# Patient Record
Sex: FEMALE | Race: WHITE | NOT HISPANIC OR LATINO | Employment: OTHER | URBAN - METROPOLITAN AREA
[De-identification: names, ages, dates, MRNs, and addresses within clinical notes are randomized per-mention and may not be internally consistent; named-entity substitution may affect disease eponyms.]

---

## 2017-11-08 VITALS — SYSTOLIC BLOOD PRESSURE: 163 MMHG | DIASTOLIC BLOOD PRESSURE: 83 MMHG | WEIGHT: 158.75 LBS

## 2017-11-08 RX ORDER — NITROGLYCERIN 0.4 MG/1
0.4 TABLET SUBLINGUAL EVERY 5 MIN PRN
COMMUNITY

## 2017-11-08 RX ORDER — ROSUVASTATIN CALCIUM 40 MG/1
40 TABLET, COATED ORAL DAILY
COMMUNITY

## 2017-11-08 RX ORDER — PANTOPRAZOLE SODIUM 40 MG/1
40 TABLET, DELAYED RELEASE ORAL DAILY
COMMUNITY
End: 2020-09-18

## 2017-11-08 RX ORDER — METOPROLOL SUCCINATE 25 MG/1
25 TABLET, EXTENDED RELEASE ORAL 2 TIMES DAILY
COMMUNITY
End: 2019-06-24 | Stop reason: SDUPTHER

## 2017-11-08 RX ORDER — ERGOCALCIFEROL 1.25 MG/1
50000 CAPSULE ORAL
COMMUNITY

## 2017-11-08 RX ORDER — ASPIRIN 81 MG/1
325 TABLET ORAL DAILY
COMMUNITY

## 2017-11-14 ENCOUNTER — OFFICE VISIT (OUTPATIENT)
Dept: SURGERY | Facility: CLINIC | Age: 67
End: 2017-11-14
Payer: MEDICARE

## 2017-11-14 VITALS
SYSTOLIC BLOOD PRESSURE: 126 MMHG | WEIGHT: 162 LBS | BODY MASS INDEX: 31.8 KG/M2 | HEIGHT: 60 IN | DIASTOLIC BLOOD PRESSURE: 83 MMHG

## 2017-11-14 DIAGNOSIS — K81.1 CHRONIC CHOLECYSTITIS: Primary | ICD-10-CM

## 2017-11-14 PROCEDURE — 99204 OFFICE O/P NEW MOD 45 MIN: CPT | Mod: ,,, | Performed by: SURGERY

## 2017-11-14 RX ORDER — HYDROCODONE BITARTRATE AND ACETAMINOPHEN 7.5; 325 MG/1; MG/1
TABLET ORAL
COMMUNITY
Start: 2017-10-24 | End: 2018-11-29

## 2017-11-14 RX ORDER — AMOXICILLIN 500 MG
2 CAPSULE ORAL DAILY
COMMUNITY
End: 2019-04-29

## 2017-11-14 RX ORDER — CHOLECALCIFEROL (VITAMIN D3) 25 MCG
1000 TABLET ORAL DAILY
COMMUNITY
End: 2018-11-29

## 2017-11-14 RX ORDER — AMOXICILLIN 875 MG/1
TABLET, FILM COATED ORAL
COMMUNITY
Start: 2017-10-24 | End: 2018-11-29

## 2017-11-14 RX ORDER — METOPROLOL TARTRATE 25 MG/1
25 TABLET, FILM COATED ORAL DAILY
COMMUNITY
Start: 2017-10-26 | End: 2018-11-29

## 2017-11-14 RX ORDER — NAPROXEN 500 MG/1
TABLET ORAL
COMMUNITY
Start: 2017-10-30 | End: 2018-11-29

## 2017-11-14 RX ORDER — BUSPIRONE HYDROCHLORIDE 7.5 MG/1
TABLET ORAL
COMMUNITY
Start: 2017-09-26 | End: 2018-11-29

## 2017-11-14 RX ORDER — POLYETHYLENE GLYCOL 3350 17 G/17G
POWDER, FOR SOLUTION ORAL
COMMUNITY
Start: 2017-09-26 | End: 2019-04-29

## 2017-11-14 RX ORDER — DICYCLOMINE HYDROCHLORIDE 10 MG/1
20 CAPSULE ORAL 4 TIMES DAILY
COMMUNITY
Start: 2017-09-26 | End: 2018-11-29

## 2017-11-14 NOTE — PROGRESS NOTES
Subjective:       Patient ID: Aimee Palumbo is a 67 y.o. female.    Chief Complaint: Gall Bladder Problem (Referred by  to ale gallbladder)      HPI:  Gallbladder: Patient presents for evaluation of gallbladder problems. Problems were first noted 6 months ago. Current symptoms include RUQ Pain, nausea, epigastric pain.  Pancreatic symptoms include none. Patient denies vomiting, jaundice, fever, chills.  Symptoms are gradually worsening.    Patient has a six-month history of intermittent right upper quadrant abdominal pain. In the last 6 weeks she complains of frequent nausea and epigastric discomfort associated with bloating and irregular bowels. She notices this is worse with fried food. During workup she was found to have gallstones.      Past Medical History:   Diagnosis Date    Coronary artery disease     GERD (gastroesophageal reflux disease)     Hyperlipidemia     Hypertension     Mitral valve prolapse     Osteoporosis      Past Surgical History:   Procedure Laterality Date    APPENDECTOMY      BREAST SURGERY  12/2010    Reduction    CARDIAC CATHETERIZATION      Cardiac Stents Placed    cardiovascular stress testing  09/19/2017    echocardiography  09/19/2017    ESOPHAGOGASTRODUODENOSCOPY      TONSILLECTOMY      TUBAL LIGATION       Review of patient's allergies indicates:  No Known Allergies  Medication List with Changes/Refills   Current Medications    AMOXICILLIN (AMOXIL) 875 MG TABLET        ASPIRIN (ECOTRIN) 81 MG EC TABLET    Take 81 mg by mouth once daily.    BUSPIRONE (BUSPAR) 7.5 MG TABLET        DICYCLOMINE (BENTYL) 10 MG CAPSULE        ERGOCALCIFEROL (ERGOCALCIFEROL) 50,000 UNIT CAP    Take 50,000 Units by mouth every 7 days.    FISH OIL-OMEGA-3 FATTY ACIDS 300-1,000 MG CAPSULE    Take 2 g by mouth once daily.    HYDROCODONE-ACETAMINOPHEN 7.5-325MG (NORCO) 7.5-325 MG PER TABLET        METOPROLOL SUCCINATE (TOPROL-XL) 25 MG 24 HR TABLET    Take 12.5 mg by mouth 2 (two)  times daily.    METOPROLOL TARTRATE (LOPRESSOR) 25 MG TABLET        NAPROXEN (EC NAPROSYN) 500 MG EC TABLET        NITROGLYCERIN (NITROSTAT) 0.4 MG SL TABLET    Place 0.4 mg under the tongue every 5 (five) minutes as needed for Chest pain.    PANTOPRAZOLE (PROTONIX) 40 MG TABLET    Take 40 mg by mouth once daily.    POLYETHYLENE GLYCOL (GLYCOLAX) 17 GRAM/DOSE POWDER        ROSUVASTATIN (CRESTOR) 20 MG TABLET    Take 20 mg by mouth once daily.    VITAMIN D 1000 UNITS TAB    Take 1,000 Units by mouth once daily.    ZOLEDRONIC ACID/MANNITOL-WATER (RECLAST IV)    Inject into the vein.     Family History   Problem Relation Age of Onset    Breast cancer Mother     Lung cancer Father     Diabetes Brother     Diabetes Maternal Uncle     Lung cancer Paternal Uncle     Stroke Maternal Grandmother      Social History     Social History    Marital status:      Spouse name: N/A    Number of children: N/A    Years of education: N/A     Social History Main Topics    Smoking status: Never Smoker    Smokeless tobacco: Never Used    Alcohol use Yes      Comment: occ    Drug use: No    Sexual activity: Not Asked     Other Topics Concern    None     Social History Narrative    None         Review of Systems   Constitutional: Negative for appetite change, chills, fever and unexpected weight change.   HENT: Negative for hearing loss, rhinorrhea, sore throat and voice change.    Eyes: Negative for photophobia and visual disturbance.   Respiratory: Negative for cough, choking and shortness of breath.    Cardiovascular: Negative for chest pain, palpitations and leg swelling.   Gastrointestinal: Positive for abdominal distention and nausea. Negative for abdominal pain, blood in stool, constipation, diarrhea and vomiting.   Endocrine: Negative for cold intolerance, heat intolerance, polydipsia and polyuria.   Musculoskeletal: Negative for arthralgias, back pain, joint swelling and neck stiffness.   Skin: Negative for  color change, pallor and rash.   Neurological: Negative for dizziness, seizures, syncope and headaches.   Hematological: Negative for adenopathy. Does not bruise/bleed easily.   Psychiatric/Behavioral: Negative for agitation, behavioral problems and confusion.       Objective:      Physical Exam   Constitutional: She appears well-developed and well-nourished.  Non-toxic appearance. No distress.   HENT:   Head: Normocephalic and atraumatic. Head is without abrasion and without laceration.   Right Ear: External ear normal.   Left Ear: External ear normal.   Nose: Nose normal.   Mouth/Throat: Oropharynx is clear and moist.   Eyes: EOM are normal. Pupils are equal, round, and reactive to light.   Neck: Trachea normal. No tracheal deviation and normal range of motion present. No thyroid mass and no thyromegaly present.   Cardiovascular: Normal rate and regular rhythm.    Pulmonary/Chest: Effort normal. No accessory muscle usage. No tachypnea. No respiratory distress.   Abdominal: Soft. Normal appearance and bowel sounds are normal. She exhibits no distension and no mass. There is no hepatosplenomegaly. There is no tenderness. There is no tenderness at McBurney's point and negative Hugo's sign. No hernia.   Lymphadenopathy:     She has no cervical adenopathy.     She has no axillary adenopathy.        Right: No inguinal adenopathy present.        Left: No inguinal adenopathy present.   Neurological: She is alert. Coordination and gait normal.   Skin: Skin is warm and intact.   Psychiatric: She has a normal mood and affect. Her speech is normal and behavior is normal.       Assessment/Plan:   Chronic cholecystitis  -     Ambulatory Referral to External Surgery  -     CBC Without Differential; Future; Expected date: 11/14/2017  -     Comprehensive metabolic panel; Future; Expected date: 11/14/2017  -     EKG 12-lead; Future  -     X-Ray Chest PA And Lateral      US reviewed      Planned procedure: Lap Carmen    Mefoxin 2  gm IV on call to OR    NPO past midnight    Reynold cloth scrub per protocol    SCDs Bilateral Lower Extremities    I discussed the proposed procedures the the patient including risks, benefits, indications, alternatives and special concerns.  The patient appears to understand and agrees to go ahead with surgery.  I have made no promises, warranties or verbal agreements beyond what was discussed above.    No Follow-up on file.

## 2017-11-27 LAB
ALBUMIN SERPL-MCNC: 4.4 G/DL (ref 3.1–4.7)
ALP SERPL-CCNC: 69 IU/L (ref 40–104)
ALT (SGPT): 29 IU/L (ref 3–33)
AST SERPL-CCNC: 28 IU/L (ref 10–40)
BILIRUB SERPL-MCNC: 0.9 MG/DL (ref 0.3–1)
BUN SERPL-MCNC: 23 MG/DL (ref 8–20)
CALCIUM SERPL-MCNC: 10 MG/DL (ref 7.7–10.4)
CHLORIDE: 102 MMOL/L (ref 98–110)
CO2 SERPL-SCNC: 30.4 MMOL/L (ref 22.8–31.6)
CREATININE: 0.87 MG/DL (ref 0.6–1.4)
GLUCOSE: 90 MG/DL (ref 70–99)
HCT VFR BLD AUTO: 41.9 % (ref 36–48)
HGB BLD-MCNC: 14.4 G/DL (ref 12–15)
MCH RBC QN AUTO: 30.8 PG (ref 25–35)
MCHC RBC AUTO-ENTMCNC: 34.4 G/DL (ref 31–36)
MCV RBC AUTO: 89.5 FL (ref 79–98)
NUCLEATED RBCS: 0 %
PLATELET # BLD AUTO: 241 K/UL (ref 140–440)
POTASSIUM SERPL-SCNC: 4.4 MMOL/L (ref 3.5–5)
PROT SERPL-MCNC: 7.6 G/DL (ref 6–8.2)
RBC # BLD AUTO: 4.68 M/UL (ref 3.5–5.5)
SODIUM: 138 MMOL/L (ref 134–144)
WBC # BLD AUTO: 9.1 K/UL (ref 5–10)

## 2017-12-28 ENCOUNTER — OFFICE VISIT (OUTPATIENT)
Dept: SURGERY | Facility: CLINIC | Age: 67
End: 2017-12-28
Payer: MEDICARE

## 2017-12-28 VITALS
DIASTOLIC BLOOD PRESSURE: 83 MMHG | SYSTOLIC BLOOD PRESSURE: 126 MMHG | HEIGHT: 60 IN | WEIGHT: 162.06 LBS | BODY MASS INDEX: 31.82 KG/M2

## 2017-12-28 DIAGNOSIS — K81.1 CHRONIC CHOLECYSTITIS: Primary | ICD-10-CM

## 2017-12-28 PROCEDURE — 99024 POSTOP FOLLOW-UP VISIT: CPT | Mod: ,,, | Performed by: SURGERY

## 2017-12-28 NOTE — PROGRESS NOTES
Subjective:       Patient ID: Aimee Palumbo is a 67 y.o. female.    Chief Complaint: Post-op Evaluation ([facility] DOS 12/13/17 Lap Carmen)      HPI:  Aimee Palumbo is here for post-op. Patient has no systemic complaints. Post operative   pain is under control.  Tolerating diet, no nausea/vomiting.  Having normal bowel movements.        Objective:      Physical Exam   Constitutional: She is oriented to person, place, and time. She is cooperative. No distress.   Abdominal: Soft. She exhibits no distension. There is no tenderness. There is no rebound and no guarding.   Neurological: She is oriented to person, place, and time.   Skin:   Incisions are clean, dry and intact  There is no evidence of infection, hematoma or seroma        Assessment/Plan:   Chronic cholecystitis      Path - reviewed    Return for F/U - As needed.

## 2017-12-28 NOTE — LETTER
December 28, 2017      Tom Mariano MD  [address]  [facility] [address]           [facility]  [address]  [address]  [facility] [address]  Phone: [phone]  Fax: [phone]          Patient: Aimee Palumbo   MR Number: [id number]   YOB: 1950   Date of Visit: 12/28/2017       Dear Dr. Tom GAINES García:    Thank you for referring Aimee Palumbo to me for evaluation. Attached you will find relevant portions of my assessment and plan of care.    If you have questions, please do not hesitate to call me. I look forward to following Aimee Palumbo along with you.    Sincerely,    Aury Lai MD    Enclosure  CC:  No Recipients    If you would like to receive this communication electronically, please contact [email or web address] or [phone] to request more information on "[employer]" Link access.    For providers and/or their staff who would like to refer a patient to [facility], please contact us through our one-stop-shop provider referral line, [facility], at [phone].    If you feel you have received this communication in error or would no longer like to receive these types of communications, please e-mail [email or web address]

## 2018-11-29 ENCOUNTER — OFFICE VISIT (OUTPATIENT)
Dept: FAMILY MEDICINE | Facility: CLINIC | Age: 68
End: 2018-11-29
Payer: MEDICARE

## 2018-11-29 VITALS
OXYGEN SATURATION: 96 % | TEMPERATURE: 98 F | BODY MASS INDEX: 33.18 KG/M2 | DIASTOLIC BLOOD PRESSURE: 60 MMHG | HEIGHT: 60 IN | HEART RATE: 76 BPM | WEIGHT: 169 LBS | SYSTOLIC BLOOD PRESSURE: 120 MMHG

## 2018-11-29 DIAGNOSIS — F41.9 ANXIETY: ICD-10-CM

## 2018-11-29 DIAGNOSIS — I10 ESSENTIAL HYPERTENSION: Primary | ICD-10-CM

## 2018-11-29 PROCEDURE — 99213 OFFICE O/P EST LOW 20 MIN: CPT | Mod: ,,, | Performed by: INTERNAL MEDICINE

## 2018-11-29 RX ORDER — VENLAFAXINE 37.5 MG/1
37.5 TABLET ORAL DAILY
COMMUNITY
End: 2019-04-29

## 2018-11-29 RX ORDER — MULTIVITAMIN
1 TABLET ORAL 2 TIMES DAILY
COMMUNITY

## 2018-11-29 RX ORDER — FERROUS SULFATE, DRIED 160(50) MG
1 TABLET, EXTENDED RELEASE ORAL 2 TIMES DAILY WITH MEALS
COMMUNITY

## 2018-11-29 RX ORDER — CETIRIZINE HYDROCHLORIDE 10 MG/1
10 TABLET ORAL DAILY
COMMUNITY
End: 2019-11-11

## 2018-11-29 NOTE — PROGRESS NOTES
Subjective:       Patient ID: Aimee Palumbo is a 68 y.o. female.    Chief Complaint: Establish Care (new patient establishment); Hypertension; Hyperlipidemia; and Anxiety    Here to establish care with me; previously seeing Dr. George who has left the area.  She reports recent labs done with [facility]. She is also followed by Cardiology.   She is aware she is due colonoscopy and sees Dr. Mariano; she was to schedule after her gallbladder surgery but hasn't done so yet.        Hypertension   This is a chronic problem. The problem is controlled. Associated symptoms include palpitations (followed by Cards). Pertinent negatives include no chest pain, headaches, neck pain, peripheral edema or shortness of breath. Past treatments include beta blockers. There are no compliance problems.      Review of Systems   Constitutional: Negative for chills, fatigue, fever and unexpected weight change.   HENT: Positive for hearing loss. Negative for congestion, postnasal drip, rhinorrhea, trouble swallowing and voice change.    Eyes: Negative for photophobia and visual disturbance.   Respiratory: Positive for cough. Negative for apnea, choking, chest tightness, shortness of breath and wheezing.    Cardiovascular: Positive for palpitations (followed by Cards). Negative for chest pain and leg swelling.   Gastrointestinal: Negative for abdominal pain, blood in stool, constipation, diarrhea, nausea, rectal pain and vomiting.   Endocrine: Positive for cold intolerance, heat intolerance and polydipsia. Negative for polyuria.   Genitourinary: Negative for decreased urine volume, difficulty urinating, dysuria, frequency, genital sores, hematuria, menstrual problem, pelvic pain, urgency, vaginal bleeding and vaginal discharge.   Musculoskeletal: Positive for back pain and neck stiffness. Negative for arthralgias, gait problem, joint swelling, myalgias and neck pain.   Skin: Positive for rash (scaring from shingles). Negative for color change  and wound.   Allergic/Immunologic: Negative for environmental allergies and food allergies.   Neurological: Negative for dizziness, tremors, seizures, syncope, facial asymmetry, speech difficulty, weakness, light-headedness, numbness and headaches.   Hematological: Negative for adenopathy. Does not bruise/bleed easily.   Psychiatric/Behavioral: Negative for confusion, hallucinations, sleep disturbance and suicidal ideas. The patient is nervous/anxious.        Past Medical History:   Diagnosis Date    Anxiety     Constipation     Coronary artery disease     GERD (gastroesophageal reflux disease)     Hyperlipidemia     Hypertension     Mitral valve prolapse     Osteoporosis       Past Surgical History:   Procedure Laterality Date    APPENDECTOMY      BREAST SURGERY  12/2010    Reduction    cardiovascular stress testing  09/19/2017    CHOLECYSTECTOMY  12/13/2017    Laparoscopic- Dr Lai    CORONARY STENT PLACEMENT  2007    echocardiography  09/19/2017    ESOPHAGOGASTRODUODENOSCOPY      TONSILLECTOMY      TUBAL LIGATION         Family History   Problem Relation Age of Onset    Breast cancer Mother     Hyperlipidemia Mother     Hypertension Mother     Lung cancer Father     Hyperlipidemia Father     Diabetes Brother     Hyperlipidemia Brother     Cancer Brother         lymphoma    Diabetes Maternal Uncle     Lung cancer Paternal Uncle     Stroke Maternal Grandmother        Social History     Socioeconomic History    Marital status:      Spouse name: None    Number of children: None    Years of education: None    Highest education level: None   Social Needs    Financial resource strain: None    Food insecurity - worry: None    Food insecurity - inability: None    Transportation needs - medical: None    Transportation needs - non-medical: None   Occupational History    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      calcium-vitamin D3 (CALCIUM 500 + D) 500 mg(1,250mg) -200 unit per tablet Take 1 tablet by mouth 2 (two) times daily with meals.       cetirizine (ZYRTEC) 10 MG tablet Take 10 mg by mouth once daily.      ergocalciferol (ERGOCALCIFEROL) 50,000 unit Cap Take 50,000 Units by mouth every 7 days.      fish oil-omega-3 fatty acids 300-1,000 mg capsule Take 2 g by mouth once daily.      metoprolol succinate (TOPROL-XL) 25 MG 24 hr tablet Take 25 mg by mouth 2 (two) times daily.       multivitamin (ONE DAILY MULTIVITAMIN) per tablet Take 1 tablet by mouth once daily.      nitroGLYCERIN (NITROSTAT) 0.4 MG SL tablet Place 0.4 mg under the tongue every 5 (five) minutes as needed for Chest pain.      pantoprazole (PROTONIX) 40 MG tablet Take 40 mg by mouth once daily.      polyethylene glycol (GLYCOLAX) 17 gram/dose powder       rosuvastatin (CRESTOR) 40 MG Tab Take 40 mg by mouth once daily.       venlafaxine (EFFEXOR) 37.5 MG Tab Take 37.5 mg by mouth once daily.      ZOLEDRONIC ACID/MANNITOL-WATER (RECLAST IV) Inject into the vein.       No current facility-administered medications for this visit.        Review of patient's allergies indicates:  No Known Allergies  Objective:    HPI     Establish Care      Additional comments: new patient establishment          Last edited by Neal Nathan MA on 11/29/2018 10:19 AM. (History)      Blood pressure 120/60, pulse 76, temperature 97.6 °F (36.4 °C), temperature source Temporal, height 4' 11.5" (1.511 m), weight 76.7 kg (169 lb), SpO2 96 %. Body " mass index is 33.56 kg/m².   Physical Exam   Constitutional: She appears well-developed. No distress.   Obese     HENT:   Nose: Nose normal.   Mouth/Throat: Oropharynx is clear and moist.   Eyes: Conjunctivae are normal. Right eye exhibits no discharge. Left eye exhibits no discharge. No scleral icterus.   Neck: Carotid bruit is not present.   Cardiovascular: Normal rate, regular rhythm and normal heart sounds.   No murmur heard.  Pulmonary/Chest: Effort normal and breath sounds normal. No respiratory distress. She has no decreased breath sounds. She has no wheezes. She has no rhonchi. She has no rales.   Abdominal: Soft. She exhibits no distension. There is no tenderness. There is no rebound and no guarding.   Musculoskeletal: She exhibits no edema.   Neurological: She is alert. She displays no tremor.   Skin: Skin is warm and dry.   Psychiatric: She has a normal mood and affect. Her speech is normal.   Nursing note and vitals reviewed.          Assessment:       1. Essential hypertension    2. Anxiety        Plan:       Aimee was seen today for establish care, hypertension, hyperlipidemia and anxiety.    Diagnoses and all orders for this visit:    Essential hypertension  Comments:  Well controlled    Anxiety  Comments:  Continue current meds

## 2019-04-29 ENCOUNTER — OFFICE VISIT (OUTPATIENT)
Dept: FAMILY MEDICINE | Facility: CLINIC | Age: 69
End: 2019-04-29
Payer: MEDICARE

## 2019-04-29 VITALS
OXYGEN SATURATION: 95 % | BODY MASS INDEX: 33.38 KG/M2 | TEMPERATURE: 95 F | SYSTOLIC BLOOD PRESSURE: 120 MMHG | HEIGHT: 60 IN | HEART RATE: 68 BPM | DIASTOLIC BLOOD PRESSURE: 76 MMHG | WEIGHT: 170 LBS

## 2019-04-29 DIAGNOSIS — E78.2 MIXED HYPERLIPIDEMIA: ICD-10-CM

## 2019-04-29 DIAGNOSIS — F41.9 ANXIETY: ICD-10-CM

## 2019-04-29 DIAGNOSIS — I10 ESSENTIAL HYPERTENSION: Primary | ICD-10-CM

## 2019-04-29 PROBLEM — R73.01 ELEVATED FASTING GLUCOSE: Status: ACTIVE | Noted: 2017-08-17

## 2019-04-29 PROCEDURE — 99213 PR OFFICE/OUTPT VISIT, EST, LEVL III, 20-29 MIN: ICD-10-PCS | Mod: ,,, | Performed by: INTERNAL MEDICINE

## 2019-04-29 PROCEDURE — 99213 OFFICE O/P EST LOW 20 MIN: CPT | Mod: ,,, | Performed by: INTERNAL MEDICINE

## 2019-04-29 RX ORDER — EZETIMIBE 10 MG/1
10 TABLET ORAL DAILY
COMMUNITY

## 2019-04-29 RX ORDER — VENLAFAXINE 37.5 MG/1
37.5 TABLET ORAL DAILY
Qty: 90 TABLET | Refills: 1 | Status: SHIPPED | OUTPATIENT
Start: 2019-04-29 | End: 2019-09-30 | Stop reason: SDUPTHER

## 2019-04-29 NOTE — PROGRESS NOTES
Subjective:       Patient ID: Aimee Palumbo is a 68 y.o. female.    Chief Complaint: Hypertension (continuity of care)    Here for routine follow up; brought copy of labs done recently with Endocrine.  She hasn't scheduled colonoscopy yet.  She is scheduled for sleep study.   She reports that she has stopped taking the Effexor; has noticed increase in anxiety and overeating since then.        Hypertension   This is a chronic problem. The problem is controlled. Pertinent negatives include no chest pain, headaches, neck pain, palpitations, peripheral edema or shortness of breath. Past treatments include beta blockers. There are no compliance problems.      Review of Systems   Constitutional: Negative for chills, fatigue, fever and unexpected weight change.   HENT: Positive for hearing loss. Negative for congestion, postnasal drip, rhinorrhea, trouble swallowing and voice change.    Eyes: Negative for photophobia and visual disturbance.   Respiratory: Negative for apnea, cough, choking, chest tightness, shortness of breath and wheezing.    Cardiovascular: Negative for chest pain, palpitations and leg swelling.   Gastrointestinal: Negative for abdominal pain, blood in stool, constipation, diarrhea, nausea, rectal pain and vomiting.   Endocrine: Negative for cold intolerance, heat intolerance, polydipsia and polyuria.   Genitourinary: Negative for decreased urine volume, difficulty urinating, dysuria, frequency, genital sores, hematuria, menstrual problem, pelvic pain, urgency, vaginal bleeding and vaginal discharge.   Musculoskeletal: Positive for arthralgias. Negative for back pain, gait problem, joint swelling, myalgias, neck pain and neck stiffness.   Skin: Negative for color change, rash and wound.   Allergic/Immunologic: Negative for environmental allergies and food allergies.   Neurological: Negative for dizziness, tremors, seizures, syncope, facial asymmetry, speech difficulty, weakness, light-headedness,  numbness and headaches.   Hematological: Negative for adenopathy. Does not bruise/bleed easily.   Psychiatric/Behavioral: Negative for confusion, hallucinations, sleep disturbance and suicidal ideas. The patient is nervous/anxious.        Past Medical History:   Diagnosis Date    Anxiety     Constipation     Coronary artery disease     Esophageal stricture     GERD (gastroesophageal reflux disease)     indefinite PPI    Hyperlipidemia     Hypertension     Mitral valve prolapse     Osteoporosis       Past Surgical History:   Procedure Laterality Date    APPENDECTOMY      BREAST SURGERY  12/2010    Reduction    cardiovascular stress testing  09/19/2017    CHOLECYSTECTOMY  12/13/2017    Laparoscopic- Dr Lai    CORONARY STENT PLACEMENT  2007    echocardiography  09/19/2017    ESOPHAGOGASTRODUODENOSCOPY      TONSILLECTOMY      TUBAL LIGATION         Family History   Problem Relation Age of Onset    Breast cancer Mother     Hyperlipidemia Mother     Hypertension Mother     Lung cancer Father     Hyperlipidemia Father     Diabetes Brother     Hyperlipidemia Brother     Cancer Brother         lymphoma    Diabetes Maternal Uncle     Lung cancer Paternal Uncle     Stroke Maternal Grandmother        Social History     Socioeconomic History    Marital status:      Spouse name: Not on file    Number of children: Not on file    Years of education: Not on file    Highest education level: Not on file   Occupational History    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   Social Needs    Financial resource strain: Not on file    Food insecurity:     Worry: Not on file     Inability: Not on file    Transportation needs:     Medical: Not on file     Non-medical: Not on file   Tobacco Use    Smoking status: Never Smoker    Smokeless tobacco: Never Used   Substance and Sexual Activity    Alcohol use: Yes     Frequency: 2-4 times a month     Drinks per session: 1 or 2     Binge frequency: Never      Comment: occ    Drug use: No    Sexual activity: Never   Lifestyle    Physical activity:     Days per week: Not on file     Minutes per session: Not on file    Stress: Not on file   Relationships    Social connections:     Talks on phone: Not on file     Gets together: Not on file     Attends Spiritism service: Not on file     Active member of club or organization: Not on file     Attends meetings of clubs or organizations: Not on file     Relationship status: Not on file   Other Topics Concern    Not on file   Social History Narrative    Son lives with her       Current Outpatient Medications   Medication Sig Dispense Refill    aspirin (ECOTRIN) 81 MG EC tablet Take 81 mg by mouth once daily.      calcium-vitamin D3 (CALCIUM 500 + D) 500 mg(1,250mg) -200 unit per tablet Take 1 tablet by mouth 2 (two) times daily with meals.       ergocalciferol (ERGOCALCIFEROL) 50,000 unit Cap Take 50,000 Units by mouth every 7 days.      ezetimibe (ZETIA) 10 mg tablet Take 10 mg by mouth once daily.      metoprolol succinate (TOPROL-XL) 25 MG 24 hr tablet Take 25 mg by mouth 2 (two) times daily.       multivitamin (ONE DAILY MULTIVITAMIN) per tablet Take 1 tablet by mouth once daily.      nitroGLYCERIN (NITROSTAT) 0.4 MG SL tablet Place 0.4 mg under the tongue every 5 (five) minutes as needed for Chest pain.      pantoprazole (PROTONIX) 40 MG tablet Take 40 mg by mouth once daily.      rosuvastatin (CRESTOR) 40 MG Tab Take 40 mg by mouth once daily.       venlafaxine (EFFEXOR) 37.5 MG Tab Take 1 tablet (37.5 mg total) by mouth once daily. 90 tablet 1    ZOLEDRONIC ACID/MANNITOL-WATER (RECLAST IV) Inject into the vein.      cetirizine (ZYRTEC) 10 MG tablet Take 10 mg by mouth once daily.       No current facility-administered medications for this visit.        Review of patient's allergies indicates:  No Known Allergies  Objective:    HPI     Hypertension      Additional comments: continuity of care          Last  "edited by Neal Nathan MA on 4/29/2019  9:25 AM. (History)      Blood pressure 120/76, pulse 68, temperature (!) 95.3 °F (35.2 °C), temperature source Temporal, height 4' 11.5" (1.511 m), weight 77.1 kg (170 lb), SpO2 95 %. Body mass index is 33.76 kg/m².   Physical Exam   Constitutional: She appears well-developed. No distress.   Obese     HENT:   Nose: Nose normal.   Mouth/Throat: Oropharynx is clear and moist.   Eyes: Conjunctivae are normal. Right eye exhibits no discharge. Left eye exhibits no discharge. No scleral icterus.   Neck: Carotid bruit is not present.   Cardiovascular: Normal rate, regular rhythm and normal heart sounds.   No murmur heard.  Pulmonary/Chest: Effort normal and breath sounds normal. No respiratory distress. She has no decreased breath sounds. She has no wheezes. She has no rhonchi. She has no rales.   Abdominal: Soft. She exhibits no distension. There is no tenderness. There is no rebound and no guarding.   Musculoskeletal: She exhibits no edema.   Neurological: She is alert. She displays no tremor.   Skin: Skin is warm and dry.   Psychiatric: She has a normal mood and affect. Her speech is normal.   Nursing note and vitals reviewed.        Reviewed labs from Endocrine  Assessment:       1. Essential hypertension    2. Anxiety    3. Mixed hyperlipidemia        Plan:       Aimee was seen today for hypertension.    Diagnoses and all orders for this visit:    Essential hypertension  Comments:  Continue current meds      Anxiety  Comments:  Discussed resuming effexor as she was getting benefit from it    Orders:  -     venlafaxine (EFFEXOR) 37.5 MG Tab; Take 1 tablet (37.5 mg total) by mouth once daily.    Mixed hyperlipidemia  Comments:  Zetia added by Endocrine.  LDL goal < 100         "

## 2019-05-29 ENCOUNTER — TELEPHONE (OUTPATIENT)
Dept: FAMILY MEDICINE | Facility: CLINIC | Age: 69
End: 2019-05-29

## 2019-05-29 NOTE — TELEPHONE ENCOUNTER
Spoke with patient. Patient states that she sees  and not . I advised patient to speak with her insurance company to update her PCP as .

## 2019-06-25 RX ORDER — METOPROLOL SUCCINATE 25 MG/1
25 TABLET, EXTENDED RELEASE ORAL 2 TIMES DAILY
Qty: 180 TABLET | Refills: 1 | Status: SHIPPED | OUTPATIENT
Start: 2019-06-25 | End: 2019-10-05 | Stop reason: SDUPTHER

## 2019-06-28 ENCOUNTER — TELEPHONE (OUTPATIENT)
Dept: FAMILY MEDICINE | Facility: CLINIC | Age: 69
End: 2019-06-28

## 2019-06-28 NOTE — TELEPHONE ENCOUNTER
----- Message from Amor Lamas Jr., MD sent at 6/27/2019  9:39 AM CDT -----  I have reviewed your results.  They demonstrate no abnormal findings.  If you have any additional concerns regarding these tests, please contact me at your convenience.

## 2019-07-02 RX ORDER — METOPROLOL SUCCINATE 25 MG/1
25 TABLET, EXTENDED RELEASE ORAL 2 TIMES DAILY
Qty: 180 TABLET | Refills: 1 | OUTPATIENT
Start: 2019-07-02

## 2019-09-30 ENCOUNTER — OFFICE VISIT (OUTPATIENT)
Dept: FAMILY MEDICINE | Facility: CLINIC | Age: 69
End: 2019-09-30
Payer: MEDICARE

## 2019-09-30 VITALS
BODY MASS INDEX: 33.96 KG/M2 | HEIGHT: 60 IN | SYSTOLIC BLOOD PRESSURE: 150 MMHG | WEIGHT: 173 LBS | HEART RATE: 83 BPM | DIASTOLIC BLOOD PRESSURE: 84 MMHG | OXYGEN SATURATION: 96 % | TEMPERATURE: 98 F

## 2019-09-30 DIAGNOSIS — I10 ESSENTIAL HYPERTENSION: Primary | ICD-10-CM

## 2019-09-30 DIAGNOSIS — F41.9 ANXIETY: ICD-10-CM

## 2019-09-30 PROCEDURE — 99213 PR OFFICE/OUTPT VISIT, EST, LEVL III, 20-29 MIN: ICD-10-PCS | Mod: S$GLB,,, | Performed by: INTERNAL MEDICINE

## 2019-09-30 PROCEDURE — 99213 OFFICE O/P EST LOW 20 MIN: CPT | Mod: S$GLB,,, | Performed by: INTERNAL MEDICINE

## 2019-09-30 RX ORDER — VENLAFAXINE 37.5 MG/1
37.5 TABLET ORAL DAILY
Qty: 90 TABLET | Refills: 1 | Status: SHIPPED | OUTPATIENT
Start: 2019-09-30 | End: 2020-03-11

## 2019-09-30 NOTE — PROGRESS NOTES
Subjective:       Patient ID: Aimee Palumbo is a 69 y.o. female.    Chief Complaint: Hypertension (continuity of care and med refill) and Hyperlipidemia    Here for routine follow up; brought copy of labs done recently with Endocrine.  Still hasn't scheduled colonoscopy yet.  She did have sleep study and is now using CPAP.   Doing better back on effexor.      Hypertension   This is a chronic problem. The problem is uncontrolled (BP was elevated at cardiologists office last week also). Associated symptoms include neck pain and palpitations. Pertinent negatives include no chest pain, headaches, peripheral edema or shortness of breath. Past treatments include beta blockers. There are no compliance problems.      Review of Systems   Constitutional: Negative for chills, fatigue, fever and unexpected weight change.   HENT: Negative for congestion, hearing loss, postnasal drip, rhinorrhea, trouble swallowing and voice change.    Eyes: Negative for photophobia and visual disturbance.   Respiratory: Positive for apnea. Negative for cough, choking, chest tightness, shortness of breath and wheezing.    Cardiovascular: Positive for palpitations. Negative for chest pain and leg swelling.   Gastrointestinal: Negative for abdominal pain, blood in stool, constipation, diarrhea, nausea, rectal pain and vomiting.   Endocrine: Negative for cold intolerance, heat intolerance, polydipsia and polyuria.   Genitourinary: Negative for decreased urine volume, difficulty urinating, dysuria, frequency, genital sores, hematuria, menstrual problem, pelvic pain, urgency, vaginal bleeding and vaginal discharge.   Musculoskeletal: Positive for neck pain and neck stiffness. Negative for arthralgias, back pain, gait problem, joint swelling and myalgias.   Skin: Negative for color change, rash and wound.   Allergic/Immunologic: Negative for environmental allergies and food allergies.   Neurological: Negative for dizziness, tremors, seizures,  syncope, facial asymmetry, speech difficulty, weakness, light-headedness, numbness and headaches.   Hematological: Negative for adenopathy. Does not bruise/bleed easily.   Psychiatric/Behavioral: Positive for confusion. Negative for hallucinations, sleep disturbance and suicidal ideas. The patient is nervous/anxious.        Past Medical History:   Diagnosis Date    Anxiety     Constipation     Coronary artery disease     Esophageal stricture     GERD (gastroesophageal reflux disease)     indefinite PPI    Hyperlipidemia     Hypertension     Mitral valve prolapse     ARLETH on CPAP     Osteoporosis       Past Surgical History:   Procedure Laterality Date    APPENDECTOMY      BREAST SURGERY  12/2010    Reduction    cardiovascular stress testing  09/19/2017    CHOLECYSTECTOMY  12/13/2017    Laparoscopic- Dr Lai    CORONARY STENT PLACEMENT  2007    echocardiography  09/19/2017    ESOPHAGOGASTRODUODENOSCOPY      TONSILLECTOMY      TUBAL LIGATION         Family History   Problem Relation Age of Onset    Breast cancer Mother     Hyperlipidemia Mother     Hypertension Mother     Lung cancer Father     Hyperlipidemia Father     Diabetes Brother     Hyperlipidemia Brother     Cancer Brother         lymphoma    Diabetes Maternal Uncle     Lung cancer Paternal Uncle     Stroke Maternal Grandmother        Social History     Socioeconomic History    Marital status:      Spouse name: Not on file    Number of children: Not on file    Years of education: Not on file    Highest education level: Not on file   Occupational History    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   Social Needs    Financial resource strain: Not on file    Food insecurity:     Worry: Not on file     Inability: Not on file    Transportation needs:     Medical: Not on file     Non-medical: Not on file   Tobacco Use    Smoking status: Never Smoker    Smokeless tobacco: Never Used   Substance and Sexual Activity    Alcohol use:  Yes     Frequency: 2-4 times a month     Drinks per session: 1 or 2     Binge frequency: Never     Comment: occ    Drug use: No    Sexual activity: Never   Lifestyle    Physical activity:     Days per week: Not on file     Minutes per session: Not on file    Stress: Not at all   Relationships    Social connections:     Talks on phone: Not on file     Gets together: Not on file     Attends [employer] service: Not on file     Active member of club or organization: Not on file     Attends meetings of clubs or organizations: Not on file     Relationship status: Not on file   Other Topics Concern    Not on file   Social History Narrative    Son lives with her       Current Outpatient Medications   Medication Sig Dispense Refill    aspirin (ECOTRIN) 81 MG EC tablet Take 81 mg by mouth once daily.      calcium-vitamin D3 (CALCIUM 500 + D) 500 mg(1,250mg) -200 unit per tablet Take 1 tablet by mouth 2 (two) times daily with meals.       cetirizine (ZYRTEC) 10 MG tablet Take 10 mg by mouth once daily.      ergocalciferol (ERGOCALCIFEROL) 50,000 unit Cap Take 50,000 Units by mouth every 7 days.      ezetimibe (ZETIA) 10 mg tablet Take 10 mg by mouth once daily.      metoprolol succinate (TOPROL-XL) 25 MG 24 hr tablet Take 1 tablet (25 mg total) by mouth 2 (two) times daily. 180 tablet 1    multivitamin (ONE DAILY MULTIVITAMIN) per tablet Take 1 tablet by mouth once daily.      nitroGLYCERIN (NITROSTAT) 0.4 MG SL tablet Place 0.4 mg under the tongue every 5 (five) minutes as needed for Chest pain.      pantoprazole (PROTONIX) 40 MG tablet Take 40 mg by mouth once daily.      rosuvastatin (CRESTOR) 40 MG Tab Take 40 mg by mouth once daily.       venlafaxine (EFFEXOR) 37.5 MG Tab Take 1 tablet (37.5 mg total) by mouth once daily. 90 tablet 1    ZOLEDRONIC ACID/MANNITOL-WATER (RECLAST IV) Inject into the vein.       No current facility-administered medications for this visit.        Review of patient's allergies  "indicates:  No Known Allergies  Objective:    HPI     Hypertension      Additional comments: continuity of care and med refill          Last edited by Neal Nathan MA on 9/30/2019  9:09 AM. (History)      Blood pressure (!) 150/84, pulse 83, temperature 97.9 °F (36.6 °C), temperature source Temporal, height 4' 11.5" (1.511 m), weight 78.5 kg (173 lb), SpO2 96 %. Body mass index is 34.36 kg/m².   Physical Exam   Constitutional: She appears well-developed. No distress.   Obese     HENT:   Nose: Nose normal.   Mouth/Throat: Oropharynx is clear and moist.   Eyes: Conjunctivae are normal. Right eye exhibits no discharge. Left eye exhibits no discharge. No scleral icterus.   Neck: Carotid bruit is not present.   Cardiovascular: Normal rate, regular rhythm and normal heart sounds.   No murmur heard.  Pulmonary/Chest: Effort normal and breath sounds normal. No respiratory distress. She has no decreased breath sounds. She has no wheezes. She has no rhonchi. She has no rales.   Abdominal: Soft. She exhibits no distension. There is no tenderness. There is no rebound and no guarding.   Musculoskeletal: She exhibits no edema.   Neurological: She is alert. She displays no tremor.   Skin: Skin is warm and dry.   Psychiatric: She has a normal mood and affect. Her speech is normal.   Nursing note and vitals reviewed.          Assessment:       1. Essential hypertension    2. Anxiety        Plan:       Aimee was seen today for hypertension and hyperlipidemia.    Diagnoses and all orders for this visit:    Essential hypertension  Comments:  She does not want to adjust meds at this time; wants to see if she can get it down with exercise    Anxiety  -     venlafaxine (EFFEXOR) 37.5 MG Tab; Take 1 tablet (37.5 mg total) by mouth once daily.           She states again that she will schedule colonsocopy  "

## 2019-10-07 RX ORDER — METOPROLOL SUCCINATE 25 MG/1
TABLET, EXTENDED RELEASE ORAL
Qty: 180 TABLET | Refills: 1 | Status: SHIPPED | OUTPATIENT
Start: 2019-10-07 | End: 2020-01-20

## 2019-10-08 ENCOUNTER — TELEPHONE (OUTPATIENT)
Dept: FAMILY MEDICINE | Facility: CLINIC | Age: 69
End: 2019-10-08

## 2019-10-08 NOTE — TELEPHONE ENCOUNTER
LUCIO: Patient called to tell you thank you for sending in her bp meds and that she is monitoring her bp and she feels better.

## 2019-11-11 ENCOUNTER — OFFICE VISIT (OUTPATIENT)
Dept: FAMILY MEDICINE | Facility: CLINIC | Age: 69
End: 2019-11-11
Payer: MEDICARE

## 2019-11-11 VITALS
DIASTOLIC BLOOD PRESSURE: 70 MMHG | OXYGEN SATURATION: 95 % | WEIGHT: 171 LBS | HEIGHT: 60 IN | BODY MASS INDEX: 33.57 KG/M2 | SYSTOLIC BLOOD PRESSURE: 128 MMHG | HEART RATE: 95 BPM | TEMPERATURE: 99 F

## 2019-11-11 DIAGNOSIS — I10 ESSENTIAL HYPERTENSION: Primary | ICD-10-CM

## 2019-11-11 DIAGNOSIS — Z23 NEED FOR PROPHYLACTIC VACCINATION AND INOCULATION AGAINST INFLUENZA: ICD-10-CM

## 2019-11-11 PROCEDURE — 99212 PR OFFICE/OUTPT VISIT, EST, LEVL II, 10-19 MIN: ICD-10-PCS | Mod: 25,S$GLB,, | Performed by: INTERNAL MEDICINE

## 2019-11-11 PROCEDURE — 90662 IIV NO PRSV INCREASED AG IM: CPT | Mod: S$GLB,,, | Performed by: INTERNAL MEDICINE

## 2019-11-11 PROCEDURE — 99212 OFFICE O/P EST SF 10 MIN: CPT | Mod: 25,S$GLB,, | Performed by: INTERNAL MEDICINE

## 2019-11-11 PROCEDURE — G0008 FLU VACCINE - HIGH DOSE (65+) PRESERVATIVE FREE IM: ICD-10-PCS | Mod: S$GLB,,, | Performed by: INTERNAL MEDICINE

## 2019-11-11 PROCEDURE — G0008 ADMIN INFLUENZA VIRUS VAC: HCPCS | Mod: S$GLB,,, | Performed by: INTERNAL MEDICINE

## 2019-11-11 PROCEDURE — 90662 FLU VACCINE - HIGH DOSE (65+) PRESERVATIVE FREE IM: ICD-10-PCS | Mod: S$GLB,,, | Performed by: INTERNAL MEDICINE

## 2019-11-11 NOTE — PROGRESS NOTES
Subjective:       Patient ID: Aimee Palumbo is a 69 y.o. female.    Chief Complaint: Hypertension (continuity of care)    Here for follow up on blood pressure.  She has been monitoring it at home and it has been running okay.  She brought her machine with her today; it gave us a reading in the 150s.  She has been walking more.     Review of Systems   Constitutional: Negative for chills, fatigue, fever and unexpected weight change.   HENT: Negative for congestion, hearing loss, postnasal drip, rhinorrhea, trouble swallowing and voice change.    Eyes: Negative for photophobia and visual disturbance.   Respiratory: Positive for apnea and cough. Negative for choking, chest tightness, shortness of breath and wheezing.    Cardiovascular: Positive for palpitations. Negative for chest pain and leg swelling.   Gastrointestinal: Negative for abdominal pain, blood in stool, constipation, diarrhea, nausea, rectal pain and vomiting.   Endocrine: Negative for cold intolerance, heat intolerance, polydipsia and polyuria.   Genitourinary: Negative for decreased urine volume, difficulty urinating, dysuria, frequency, genital sores, hematuria, menstrual problem, pelvic pain, urgency, vaginal bleeding and vaginal discharge.   Musculoskeletal: Positive for arthralgias, back pain, neck pain and neck stiffness. Negative for gait problem, joint swelling and myalgias.   Skin: Negative for color change, rash and wound.   Allergic/Immunologic: Negative for environmental allergies and food allergies.   Neurological: Positive for numbness (hands). Negative for dizziness, tremors, seizures, syncope, facial asymmetry, speech difficulty, weakness, light-headedness and headaches.   Hematological: Negative for adenopathy. Does not bruise/bleed easily.   Psychiatric/Behavioral: Negative for confusion, hallucinations, sleep disturbance and suicidal ideas. The patient is not nervous/anxious.        Past Medical History:   Diagnosis Date    Anxiety      Constipation     Coronary artery disease     Esophageal stricture     GERD (gastroesophageal reflux disease)     indefinite PPI    Hyperlipidemia     Hypertension     Mitral valve prolapse     ARLETH on CPAP     Osteoporosis       Past Surgical History:   Procedure Laterality Date    APPENDECTOMY      BREAST SURGERY  12/2010    Reduction    cardiovascular stress testing  09/19/2017    CHOLECYSTECTOMY  12/13/2017    Laparoscopic- Dr Lai    CORONARY STENT PLACEMENT  2007    echocardiography  09/19/2017    ESOPHAGOGASTRODUODENOSCOPY      TONSILLECTOMY      TUBAL LIGATION         Family History   Problem Relation Age of Onset    Breast cancer Mother     Hyperlipidemia Mother     Hypertension Mother     Lung cancer Father     Hyperlipidemia Father     Diabetes Brother     Hyperlipidemia Brother     Cancer Brother         lymphoma    Diabetes Maternal Uncle     Lung cancer Paternal Uncle     Stroke Maternal Grandmother        Social History     Socioeconomic History    Marital status:      Spouse name: Not on file    Number of children: Not on file    Years of education: Not on file    Highest education level: Not on file   Occupational History    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   Social Needs    Financial resource strain: Not on file    Food insecurity:     Worry: Not on file     Inability: Not on file    Transportation needs:     Medical: Not on file     Non-medical: Not on file   Tobacco Use    Smoking status: Never Smoker    Smokeless tobacco: Never Used   Substance and Sexual Activity    Alcohol use: Yes     Frequency: 2-4 times a month     Drinks per session: 1 or 2     Binge frequency: Never     Comment: occ    Drug use: No    Sexual activity: Never   Lifestyle    Physical activity:     Days per week: Not on file     Minutes per session: Not on file    Stress: Not at all   Relationships    Social connections:     Talks on phone: Not on file     Gets together: Not  "on file     Attends [employer] service: Not on file     Active member of club or organization: Not on file     Attends meetings of clubs or organizations: Not on file     Relationship status: Not on file   Other Topics Concern    Not on file   Social History Narrative    Son lives with her       Current Outpatient Medications   Medication Sig Dispense Refill    aspirin (ECOTRIN) 81 MG EC tablet Take 81 mg by mouth once daily.      calcium-vitamin D3 (CALCIUM 500 + D) 500 mg(1,250mg) -200 unit per tablet Take 1 tablet by mouth 2 (two) times daily with meals.       ergocalciferol (ERGOCALCIFEROL) 50,000 unit Cap Take 50,000 Units by mouth every 7 days.      ezetimibe (ZETIA) 10 mg tablet Take 10 mg by mouth once daily.      metoprolol succinate (TOPROL-XL) 25 MG 24 hr tablet TAKE 1 TABLET(25 MG) BY MOUTH TWICE DAILY 180 tablet 1    multivitamin (ONE DAILY MULTIVITAMIN) per tablet Take 1 tablet by mouth once daily.      nitroGLYCERIN (NITROSTAT) 0.4 MG SL tablet Place 0.4 mg under the tongue every 5 (five) minutes as needed for Chest pain.      pantoprazole (PROTONIX) 40 MG tablet Take 40 mg by mouth once daily.      rosuvastatin (CRESTOR) 40 MG Tab Take 40 mg by mouth once daily.       venlafaxine (EFFEXOR) 37.5 MG Tab Take 1 tablet (37.5 mg total) by mouth once daily. 90 tablet 1    ZOLEDRONIC ACID/MANNITOL-WATER (RECLAST IV) Inject into the vein.       No current facility-administered medications for this visit.        Review of patient's allergies indicates:  No Known Allergies  Objective:    HPI     Hypertension      Additional comments: continuity of care          Last edited by Neal Nathan MA on 11/11/2019 10:03 AM. (History)      Blood pressure 128/70, pulse 95, temperature 98.6 °F (37 °C), temperature source Temporal, height 4' 11.5" (1.511 m), weight 77.6 kg (171 lb), SpO2 95 %. Body mass index is 33.96 kg/m².   Physical Exam   Constitutional: She appears well-developed and well-nourished. "   Cardiovascular: Normal rate and regular rhythm.   Nursing note and vitals reviewed.          Assessment:       1. Essential hypertension    2. Need for prophylactic vaccination and inoculation against influenza        Plan:       Aimee was seen today for hypertension.    Diagnoses and all orders for this visit:    Essential hypertension  Comments:  Better today.  Continue current meds    Need for prophylactic vaccination and inoculation against influenza  -     Influenza - High Dose (65+) (PF) (IM)

## 2019-12-05 ENCOUNTER — TELEPHONE (OUTPATIENT)
Dept: FAMILY MEDICINE | Facility: CLINIC | Age: 69
End: 2019-12-05

## 2019-12-05 DIAGNOSIS — Z11.59 NEED FOR HEPATITIS C SCREENING TEST: Primary | ICD-10-CM

## 2019-12-05 NOTE — TELEPHONE ENCOUNTER
Patient is having labs drawn on Monday at Dr. Richard office and he ordered a lipid, cmp, tsh, a1c, and vitamin d. Patient want to know if you want to had any other labs to the order, or not.

## 2019-12-11 ENCOUNTER — TELEPHONE (OUTPATIENT)
Dept: FAMILY MEDICINE | Facility: CLINIC | Age: 69
End: 2019-12-11

## 2019-12-11 LAB
HCV AB S/CO SERPL IA: 0.01
HCV AB SERPL QL IA: NORMAL

## 2019-12-11 NOTE — TELEPHONE ENCOUNTER
----- Message from Amor Lamas Jr., MD sent at 12/11/2019 12:41 PM CST -----  I have reviewed your results.  They demonstrate no abnormal findings.  If you have any additional concerns regarding these tests, please contact me at your convenience.

## 2019-12-19 DIAGNOSIS — M81.0 SENILE OSTEOPOROSIS: ICD-10-CM

## 2019-12-19 RX ORDER — ZOLEDRONIC ACID 5 MG/100ML
5 INJECTION, SOLUTION INTRAVENOUS
Status: CANCELLED | OUTPATIENT
Start: 2020-01-20

## 2019-12-30 ENCOUNTER — TELEPHONE (OUTPATIENT)
Dept: FAMILY MEDICINE | Facility: CLINIC | Age: 69
End: 2019-12-30

## 2020-01-06 ENCOUNTER — INFUSION (OUTPATIENT)
Dept: INFUSION THERAPY | Facility: HOSPITAL | Age: 70
End: 2020-01-06
Attending: INTERNAL MEDICINE
Payer: MEDICARE

## 2020-01-06 VITALS
BODY MASS INDEX: 34.77 KG/M2 | HEIGHT: 60 IN | TEMPERATURE: 98 F | RESPIRATION RATE: 16 BRPM | DIASTOLIC BLOOD PRESSURE: 65 MMHG | HEART RATE: 80 BPM | SYSTOLIC BLOOD PRESSURE: 129 MMHG | WEIGHT: 177.13 LBS | OXYGEN SATURATION: 96 %

## 2020-01-06 DIAGNOSIS — M81.0 SENILE OSTEOPOROSIS: Primary | ICD-10-CM

## 2020-01-06 PROCEDURE — 96365 THER/PROPH/DIAG IV INF INIT: CPT

## 2020-01-06 PROCEDURE — 63600175 PHARM REV CODE 636 W HCPCS: Performed by: INTERNAL MEDICINE

## 2020-01-06 RX ORDER — ZOLEDRONIC ACID 5 MG/100ML
5 INJECTION, SOLUTION INTRAVENOUS
Status: COMPLETED | OUTPATIENT
Start: 2020-01-06 | End: 2020-01-06

## 2020-01-06 RX ORDER — ZOLEDRONIC ACID 5 MG/100ML
5 INJECTION, SOLUTION INTRAVENOUS
Status: CANCELLED | OUTPATIENT
Start: 2020-01-06

## 2020-01-06 RX ADMIN — ZOLEDRONIC ACID 5 MG: 5 INJECTION, SOLUTION INTRAVENOUS at 03:01

## 2020-01-20 RX ORDER — METOPROLOL SUCCINATE 25 MG/1
TABLET, EXTENDED RELEASE ORAL
Qty: 180 TABLET | Refills: 1 | Status: SHIPPED | OUTPATIENT
Start: 2020-01-20 | End: 2020-08-03

## 2020-02-10 ENCOUNTER — OFFICE VISIT (OUTPATIENT)
Dept: ORTHOPEDICS | Facility: CLINIC | Age: 70
End: 2020-02-10
Payer: MEDICARE

## 2020-02-10 VITALS
SYSTOLIC BLOOD PRESSURE: 119 MMHG | DIASTOLIC BLOOD PRESSURE: 66 MMHG | HEART RATE: 86 BPM | HEIGHT: 60 IN | WEIGHT: 170 LBS | BODY MASS INDEX: 33.38 KG/M2

## 2020-02-10 DIAGNOSIS — M65.321 TRIGGER FINGER, RIGHT INDEX FINGER: Primary | ICD-10-CM

## 2020-02-10 DIAGNOSIS — M79.644 FINGER PAIN, RIGHT: ICD-10-CM

## 2020-02-10 PROCEDURE — 20550 TENDON SHEATH: R INDEX MCP: ICD-10-PCS | Mod: RT,S$GLB,, | Performed by: ORTHOPAEDIC SURGERY

## 2020-02-10 PROCEDURE — 99203 OFFICE O/P NEW LOW 30 MIN: CPT | Mod: 25,S$GLB,, | Performed by: ORTHOPAEDIC SURGERY

## 2020-02-10 PROCEDURE — 99203 PR OFFICE/OUTPT VISIT, NEW, LEVL III, 30-44 MIN: ICD-10-PCS | Mod: 25,S$GLB,, | Performed by: ORTHOPAEDIC SURGERY

## 2020-02-10 PROCEDURE — 20550 NJX 1 TENDON SHEATH/LIGAMENT: CPT | Mod: RT,S$GLB,, | Performed by: ORTHOPAEDIC SURGERY

## 2020-02-10 RX ORDER — METHYLPREDNISOLONE ACETATE 40 MG/ML
40 INJECTION, SUSPENSION INTRA-ARTICULAR; INTRALESIONAL; INTRAMUSCULAR; SOFT TISSUE
Status: DISCONTINUED | OUTPATIENT
Start: 2020-02-10 | End: 2020-02-10 | Stop reason: HOSPADM

## 2020-02-10 RX ADMIN — METHYLPREDNISOLONE ACETATE 40 MG: 40 INJECTION, SUSPENSION INTRA-ARTICULAR; INTRALESIONAL; INTRAMUSCULAR; SOFT TISSUE at 01:02

## 2020-02-10 NOTE — PROGRESS NOTES
[facility]    Subjective:     Chief Complaint:   Chief Complaint   Patient presents with    Right Hand - Pain     Right hand index finger pain x 2 months. Pain is constant and states that it gets worse with activity, hard to lift anything with it, open it.        Past Medical History:   Diagnosis Date    Anxiety     Constipation     Coronary artery disease     Esophageal stricture     GERD (gastroesophageal reflux disease)     indefinite PPI    Hyperlipidemia     Hypertension     Mitral valve prolapse     ARLETH on CPAP     Osteoporosis        Past Surgical History:   Procedure Laterality Date    APPENDECTOMY      BREAST SURGERY  12/2010    Reduction    cardiovascular stress testing  09/19/2017    CHOLECYSTECTOMY  12/13/2017    Laparoscopic- Dr Lai    CORONARY STENT PLACEMENT  2007    echocardiography  09/19/2017    ESOPHAGOGASTRODUODENOSCOPY      TONSILLECTOMY      TUBAL LIGATION         Current Outpatient Medications   Medication Sig    aspirin (ECOTRIN) 81 MG EC tablet Take 81 mg by mouth once daily.    calcium-vitamin D3 (CALCIUM 500 + D) 500 mg(1,250mg) -200 unit per tablet Take 1 tablet by mouth 2 (two) times daily with meals.     ergocalciferol (ERGOCALCIFEROL) 50,000 unit Cap Take 50,000 Units by mouth every 7 days.    ezetimibe (ZETIA) 10 mg tablet Take 10 mg by mouth once daily.    metoprolol succinate (TOPROL-XL) 25 MG 24 hr tablet TAKE 1 TABLET(25 MG) BY MOUTH TWICE DAILY    multivitamin (ONE DAILY MULTIVITAMIN) per tablet Take 1 tablet by mouth once daily.    pantoprazole (PROTONIX) 40 MG tablet Take 40 mg by mouth once daily.    rosuvastatin (CRESTOR) 40 MG Tab Take 40 mg by mouth once daily.     venlafaxine (EFFEXOR) 37.5 MG Tab Take 1 tablet (37.5 mg total) by mouth once daily.    ZOLEDRONIC ACID/MANNITOL-WATER (RECLAST IV) Inject into the vein.    nitroGLYCERIN (NITROSTAT) 0.4 MG SL tablet Place 0.4 mg under the tongue every 5 (five) minutes as needed for  Chest pain.     No current facility-administered medications for this visit.        Review of patient's allergies indicates:  No Known Allergies    Family History   Problem Relation Age of Onset    Breast cancer Mother     Hyperlipidemia Mother     Hypertension Mother     Lung cancer Father     Hyperlipidemia Father     Diabetes Brother     Hyperlipidemia Brother     Cancer Brother         lymphoma    Diabetes Maternal Uncle     Lung cancer Paternal Uncle     Stroke Maternal Grandmother        Social History     Socioeconomic History    Marital status:      Spouse name: Not on file    Number of children: Not on file    Years of education: Not on file    Highest education level: Not on file   Occupational History    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   Social Needs    Financial resource strain: Not on file    Food insecurity:     Worry: Not on file     Inability: Not on file    Transportation needs:     Medical: Not on file     Non-medical: Not on file   Tobacco Use    Smoking status: Never Smoker    Smokeless tobacco: Never Used   Substance and Sexual Activity    Alcohol use: Yes     Frequency: 2-4 times a month     Drinks per session: 1 or 2     Binge frequency: Never     Comment: occ    Drug use: No    Sexual activity: Never   Lifestyle    Physical activity:     Days per week: Not on file     Minutes per session: Not on file    Stress: Not at all   Relationships    Social connections:     Talks on phone: Not on file     Gets together: Not on file     Attends [employer] service: Not on file     Active member of club or organization: Not on file     Attends meetings of clubs or organizations: Not on file     Relationship status: Not on file   Other Topics Concern    Not on file   Social History Narrative    Son lives with her       History of present illness:  Right the index finger pain.  Seems to be a trigger that has been there probably 3 months.  She has had a trigger finger injected left  hand years ago that did well      Review of Systems:    Constitution: Negative for chills, fever, and sweats.  Negative for unexplained weight loss.    HENT:  Negative for headaches and blurry vision.    Cardiovascular:Negative for chest pain or irregular heart beat. Negative for hypertension.    Respiratory:  Negative for cough and shortness of breath.    Gastrointestinal: Negative for abdominal pain, heartburn, melena, nausea, and vomitting.    Genitourinary:  Negative bladder incontinence and dysuria.    Musculoskeletal:  See HPI for details.     Neurological: Negative for numbness.    Psychiatric/Behavioral: Negative for depression.  The patient is not nervous/anxious.      Endocrine: Negative for polyuria    Hematologic/Lymphatic: Negative for bleeding problem.  Does not bruise/bleed easily.    Skin: Negative for poor would healing and rash    Objective:      Physical Examination:    Vital Signs:    Vitals:    02/10/20 1340   BP: 119/66   Pulse: 86       Body mass index is 33.76 kg/m².    This a well-developed, well nourished patient in no acute distress.  They are alert and oriented and cooperative to examination.        Physical exam right hand index finger she clearly has a trigger at the base of the finger does have motion but it does trigger and tender in that area rest of the finger looks normal with no swelling and good passive range of motion. No triggering of any other fingers.  Pertinent New Results:    XRAY Report / Interpretation:   AP lateral right index is unremarkable.  No acute or chronic findings.  Electronically Signed By Yan Hassan JR, MD    Assessment/Plan:      So impression is trigger finger right index plan is steroid injection 1 cc Depo-Medrol and 2 cc of lidocaine. And we will see her back p.r.n..      This note was created using Dragon voice recognition software that occasionally misinterpreted phrases or words.

## 2020-02-10 NOTE — PROCEDURES
Tendon Sheath: R index MCP  Date/Time: 2/10/2020 1:15 PM  Performed by: Yan Hassan Jr., MD  Authorized by: Yan Hassan Jr., MD     Consent Done?:  Yes (Verbal)  Timeout: prior to procedure the correct patient, procedure, and site was verified    Indications:  Pain  Site marked: the procedure site was marked    Timeout: prior to procedure the correct patient, procedure, and site was verified    Location:  Index finger  Site:  R index MCP  Prep: patient was prepped and draped in usual sterile fashion    Ultrasonic guidance for needle placement?: No    Needle size:  25 G  Medications:  40 mg methylPREDNISolone acetate 40 mg/mL  Patient tolerance:  Patient tolerated the procedure well with no immediate complications

## 2020-03-11 DIAGNOSIS — F41.9 ANXIETY: ICD-10-CM

## 2020-03-11 RX ORDER — VENLAFAXINE 37.5 MG/1
TABLET ORAL
Qty: 90 TABLET | Refills: 1 | Status: SHIPPED | OUTPATIENT
Start: 2020-03-11 | End: 2021-03-02

## 2020-06-16 ENCOUNTER — PES CALL (OUTPATIENT)
Dept: ADMINISTRATIVE | Facility: CLINIC | Age: 70
End: 2020-06-16

## 2020-06-22 ENCOUNTER — OFFICE VISIT (OUTPATIENT)
Dept: ORTHOPEDICS | Facility: CLINIC | Age: 70
End: 2020-06-22
Payer: MEDICARE

## 2020-06-22 VITALS
SYSTOLIC BLOOD PRESSURE: 138 MMHG | WEIGHT: 174 LBS | BODY MASS INDEX: 34.16 KG/M2 | HEART RATE: 83 BPM | DIASTOLIC BLOOD PRESSURE: 79 MMHG | HEIGHT: 60 IN

## 2020-06-22 DIAGNOSIS — M17.11 PRIMARY OSTEOARTHRITIS OF RIGHT KNEE: Primary | ICD-10-CM

## 2020-06-22 PROCEDURE — 99213 PR OFFICE/OUTPT VISIT, EST, LEVL III, 20-29 MIN: ICD-10-PCS | Mod: 25,S$GLB,, | Performed by: ORTHOPAEDIC SURGERY

## 2020-06-22 PROCEDURE — 20610 LARGE JOINT ASPIRATION/INJECTION: R KNEE: ICD-10-PCS | Mod: RT,S$GLB,, | Performed by: ORTHOPAEDIC SURGERY

## 2020-06-22 PROCEDURE — 20610 DRAIN/INJ JOINT/BURSA W/O US: CPT | Mod: RT,S$GLB,, | Performed by: ORTHOPAEDIC SURGERY

## 2020-06-22 PROCEDURE — 99213 OFFICE O/P EST LOW 20 MIN: CPT | Mod: 25,S$GLB,, | Performed by: ORTHOPAEDIC SURGERY

## 2020-06-22 RX ORDER — METHYLPREDNISOLONE ACETATE 40 MG/ML
40 INJECTION, SUSPENSION INTRA-ARTICULAR; INTRALESIONAL; INTRAMUSCULAR; SOFT TISSUE
Status: DISCONTINUED | OUTPATIENT
Start: 2020-06-22 | End: 2020-06-22 | Stop reason: HOSPADM

## 2020-06-22 RX ADMIN — METHYLPREDNISOLONE ACETATE 40 MG: 40 INJECTION, SUSPENSION INTRA-ARTICULAR; INTRALESIONAL; INTRAMUSCULAR; SOFT TISSUE at 01:06

## 2020-06-22 NOTE — PROGRESS NOTES
[facility]    Subjective:     Chief Complaint:   Chief Complaint   Patient presents with    Right Knee - Pain     Right knee pain x a while. States that she had an injection in the knee about 5 years ago and states that it did help. States that she has pain with certain movements and activity.        Past Medical History:   Diagnosis Date    Anxiety     Constipation     Coronary artery disease     Esophageal stricture     GERD (gastroesophageal reflux disease)     indefinite PPI    Hyperlipidemia     Hypertension     Mitral valve prolapse     ARLETH on CPAP     Osteoporosis        Past Surgical History:   Procedure Laterality Date    APPENDECTOMY      BREAST SURGERY  12/2010    Reduction    cardiovascular stress testing  09/19/2017    CHOLECYSTECTOMY  12/13/2017    Laparoscopic- Dr Lai    CORONARY STENT PLACEMENT  2007    echocardiography  09/19/2017    ESOPHAGOGASTRODUODENOSCOPY      TONSILLECTOMY      TUBAL LIGATION         Current Outpatient Medications   Medication Sig    aspirin (ECOTRIN) 81 MG EC tablet Take 81 mg by mouth once daily.    calcium-vitamin D3 (CALCIUM 500 + D) 500 mg(1,250mg) -200 unit per tablet Take 1 tablet by mouth 2 (two) times daily with meals.     ergocalciferol (ERGOCALCIFEROL) 50,000 unit Cap Take 50,000 Units by mouth every 7 days.    ezetimibe (ZETIA) 10 mg tablet Take 10 mg by mouth once daily.    metoprolol succinate (TOPROL-XL) 25 MG 24 hr tablet TAKE 1 TABLET(25 MG) BY MOUTH TWICE DAILY    multivitamin (ONE DAILY MULTIVITAMIN) per tablet Take 1 tablet by mouth once daily.    nitroGLYCERIN (NITROSTAT) 0.4 MG SL tablet Place 0.4 mg under the tongue every 5 (five) minutes as needed for Chest pain.    pantoprazole (PROTONIX) 40 MG tablet Take 40 mg by mouth once daily.    rosuvastatin (CRESTOR) 40 MG Tab Take 40 mg by mouth once daily.     venlafaxine (EFFEXOR) 37.5 MG Tab TAKE 1 TABLET(37.5 MG) BY MOUTH EVERY DAY (Patient not taking:  Reported on 6/22/2020)    ZOLEDRONIC ACID/MANNITOL-WATER (RECLAST IV) Inject into the vein.     No current facility-administered medications for this visit.        Review of patient's allergies indicates:  No Known Allergies    Family History   Problem Relation Age of Onset    Breast cancer Mother     Hyperlipidemia Mother     Hypertension Mother     Lung cancer Father     Hyperlipidemia Father     Diabetes Brother     Hyperlipidemia Brother     Cancer Brother         lymphoma    Diabetes Maternal Uncle     Lung cancer Paternal Uncle     Stroke Maternal Grandmother        Social History     Socioeconomic History    Marital status:      Spouse name: Not on file    Number of children: Not on file    Years of education: Not on file    Highest education level: Not on file   Occupational History    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   Social Needs    Financial resource strain: Not on file    Food insecurity     Worry: Not on file     Inability: Not on file    Transportation needs     Medical: Not on file     Non-medical: Not on file   Tobacco Use    Smoking status: Never Smoker    Smokeless tobacco: Never Used   Substance and Sexual Activity    Alcohol use: Yes     Frequency: 2-4 times a month     Drinks per session: 1 or 2     Binge frequency: Never     Comment: occ    Drug use: No    Sexual activity: Never   Lifestyle    Physical activity     Days per week: Not on file     Minutes per session: Not on file    Stress: Not at all   Relationships    Social connections     Talks on phone: Not on file     Gets together: Not on file     Attends Religion service: Not on file     Active member of club or organization: Not on file     Attends meetings of clubs or organizations: Not on file     Relationship status: Not on file   Other Topics Concern    Not on file   Social History Narrative    Son lives with her       History of present illness:  t this lady has right knee pain.  Szot about 5 years ago  seemed to do pretty well.  She has a some pain in the knee but it is not severely disabling she can walk 2 miles she does not have a lot a catching sensation nor big effusion.  She is not limping at this point this wants to see more about the right knee    Review of Systems:    Constitution: Negative for chills, fever, and sweats.  Negative for unexplained weight loss.    HENT:  Negative for headaches and blurry vision.    Cardiovascular:Negative for chest pain or irregular heart beat. Negative for hypertension.    Respiratory:  Negative for cough and shortness of breath.    Gastrointestinal: Negative for abdominal pain, heartburn, melena, nausea, and vomitting.    Genitourinary:  Negative bladder incontinence and dysuria.    Musculoskeletal:  See HPI for details.     Neurological: Negative for numbness.    Psychiatric/Behavioral: Negative for depression.  The patient is not nervous/anxious.      Endocrine: Negative for polyuria    Hematologic/Lymphatic: Negative for bleeding problem.  Does not bruise/bleed easily.    Skin: Negative for poor would healing and rash    Objective:      Physical Examination:    Vital Signs:    Vitals:    06/22/20 1330   BP: 138/79   Pulse: 83       Body mass index is 34.56 kg/m².    This a well-developed, well nourished patient in no acute distress.  They are alert and oriented and cooperative to examination.        Right knee shows some tenderness along medial joint line she has really no significant Iker still has good flexion good extension no effusion no crepitation or tenderness around the patella  Pertinent New Results:    XRAY Report / Interpretation:   AP x-ray right knee standing shows that there is more than 50% collapse medial compartment right knee and less than 50% collapse medial compartment left knee with arthritic change.  Is also mild arthritic change in the patellofemoral joint on lateral and sunrise in the right knee.  No acute findings.   Signature    Assessment/Plan:   So impression is primarily osteoarthritis right knee no severe meniscal symptoms.  Plan steroid injection right knee should get her some more miles.  I do not think that an arthroscopy would help this knee that looks like there is too much arthritis.  And she does not have enough disability talk about total knee.  So steroid injection right knee 1 cc Depo-Medrol 5 cc lidocaine anterolateral right knee today.  Return p.r.n.      This note was created using Dragon voice recognition software that occasionally misinterpreted phrases or words.

## 2020-06-22 NOTE — PROCEDURES
Large Joint Aspiration/Injection: R knee    Date/Time: 6/22/2020 1:30 PM  Performed by: Yan Hassan Jr., MD  Authorized by: Yan Hassan Jr., MD     Consent Done?:  Yes (Verbal)  Indications:  Pain  Site marked: the procedure site was marked    Timeout: prior to procedure the correct patient, procedure, and site was verified    Prep: patient was prepped and draped in usual sterile fashion      Local anesthesia used?: Yes    Local anesthetic:  Lidocaine 1% without epinephrine    Details:  Needle Size:  25 G  Ultrasonic Guidance for needle placement?: No    Location:  Knee  Site:  R knee  Medications:  40 mg methylPREDNISolone acetate 40 mg/mL  Patient tolerance:  Patient tolerated the procedure well with no immediate complications

## 2020-07-21 DIAGNOSIS — Z12.31 ENCOUNTER FOR SCREENING MAMMOGRAM FOR MALIGNANT NEOPLASM OF BREAST: Primary | ICD-10-CM

## 2020-07-22 ENCOUNTER — HOSPITAL ENCOUNTER (OUTPATIENT)
Dept: RADIOLOGY | Facility: HOSPITAL | Age: 70
Discharge: HOME | End: 2020-07-22
Attending: OBSTETRICS & GYNECOLOGY
Payer: MEDICARE

## 2020-07-22 VITALS — HEIGHT: 60 IN | BODY MASS INDEX: 34.15 KG/M2 | WEIGHT: 173.94 LBS

## 2020-07-22 DIAGNOSIS — Z12.31 ENCOUNTER FOR SCREENING MAMMOGRAM FOR MALIGNANT NEOPLASM OF BREAST: ICD-10-CM

## 2020-07-22 PROCEDURE — 77067 SCR MAMMO BI INCL CAD: CPT | Mod: TC,PO

## 2020-10-19 ENCOUNTER — TELEPHONE (OUTPATIENT)
Dept: CARDIOLOGY | Facility: CLINIC | Age: 70
End: 2020-10-19

## 2020-10-19 NOTE — TELEPHONE ENCOUNTER
Patient rx was called in verbal spoke with [facility] pharmacy. Rx was for Metoprolol Succinate 25 bid. #180 3 refills.

## 2020-10-19 NOTE — TELEPHONE ENCOUNTER
----- Message from Anita Ram sent at 10/19/2020  9:29 AM CDT -----  Regarding: prescription  Please check on patient prescription it's showing that it was sent over on the 12th but the pharmacy says they don't have it please call patient with details [phone]

## 2020-10-26 ENCOUNTER — TELEPHONE (OUTPATIENT)
Dept: CARDIOLOGY | Facility: CLINIC | Age: 70
End: 2020-10-26

## 2020-10-26 NOTE — TELEPHONE ENCOUNTER
----- Message from Glendy Allred sent at 10/26/2020 11:41 AM CDT -----  Regarding: refill  Protonix 90 days not 30days   [id number] - [place], [place] - [address]      [phone]

## 2020-10-26 NOTE — TELEPHONE ENCOUNTER
Patient was advised to get Protonix from pcp. Dr. HOBBS only fills meds for so long and wants patient to get from pcp.

## 2021-01-04 ENCOUNTER — TELEPHONE (OUTPATIENT)
Dept: INFUSION THERAPY | Facility: HOSPITAL | Age: 71
End: 2021-01-04

## 2021-02-03 ENCOUNTER — TELEPHONE (OUTPATIENT)
Dept: CARDIOLOGY | Facility: CLINIC | Age: 71
End: 2021-02-03
Payer: MEDICARE

## 2021-02-03 NOTE — TELEPHONE ENCOUNTER
----- Message from Stacey Sales MA sent at 2/3/2021  1:15 PM CST -----  Type:  Patient Returning Call    Who Called:  Aimee  Who Left Message for Patient:  unknown  Does the patient know what this is regarding?:  unknown  Best Call Back Number:  [phone]  Additional Information:

## 2021-02-09 ENCOUNTER — HOSPITAL ENCOUNTER (EMERGENCY)
Facility: HOSPITAL | Age: 71
Discharge: HOME | End: 2021-02-09
Attending: EMERGENCY MEDICINE
Payer: MEDICARE

## 2021-02-09 VITALS
OXYGEN SATURATION: 99 % | RESPIRATION RATE: 18 BRPM | TEMPERATURE: 98 F | BODY MASS INDEX: 32.59 KG/M2 | WEIGHT: 166 LBS | HEART RATE: 78 BPM | DIASTOLIC BLOOD PRESSURE: 89 MMHG | SYSTOLIC BLOOD PRESSURE: 190 MMHG | HEIGHT: 60 IN

## 2021-02-09 DIAGNOSIS — M25.561 ACUTE PAIN OF BOTH KNEES: ICD-10-CM
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
## 2021-02-09 DIAGNOSIS — M25.562 ACUTE PAIN OF BOTH KNEES: ICD-10-CM

## 2021-02-09 DIAGNOSIS — S80.02XA CONTUSION OF LEFT KNEE, INITIAL ENCOUNTER: ICD-10-CM

## 2021-02-09 DIAGNOSIS — S10.91XA ABRASION OF NECK, INITIAL ENCOUNTER: ICD-10-CM

## 2021-02-09 PROCEDURE — 99283 EMERGENCY DEPT VISIT LOW MDM: CPT

## 2021-02-09 PROCEDURE — 25000003 PHARM REV CODE 250: Performed by: NURSE PRACTITIONER

## 2021-02-09 RX ORDER — NAPROXEN 250 MG/1
500 TABLET ORAL
Status: COMPLETED | OUTPATIENT
Start: 2021-02-09 | End: 2021-02-09

## 2021-02-09 RX ORDER — BACITRACIN ZINC 500 UNIT/G
OINTMENT (GRAM) TOPICAL 2 TIMES DAILY
Qty: 30 G | Refills: 0 | Status: SHIPPED | OUTPATIENT
Start: 2021-02-09 | End: 2021-03-02

## 2021-02-09 RX ORDER — NAPROXEN 500 MG/1
500 TABLET ORAL 2 TIMES DAILY WITH MEALS
Qty: 10 TABLET | Refills: 0 | Status: SHIPPED | OUTPATIENT
Start: 2021-02-09 | End: 2021-02-14

## 2021-02-09 RX ADMIN — NAPROXEN 500 MG: 250 TABLET ORAL at 09:02

## 2021-03-02 ENCOUNTER — OFFICE VISIT (OUTPATIENT)
Dept: CARDIOLOGY | Facility: CLINIC | Age: 71
End: 2021-03-02
Payer: MEDICARE

## 2021-03-02 ENCOUNTER — TELEPHONE (OUTPATIENT)
Dept: CARDIOLOGY | Facility: CLINIC | Age: 71
End: 2021-03-02

## 2021-03-02 VITALS
HEART RATE: 78 BPM | WEIGHT: 168 LBS | DIASTOLIC BLOOD PRESSURE: 72 MMHG | HEIGHT: 60 IN | RESPIRATION RATE: 18 BRPM | BODY MASS INDEX: 32.98 KG/M2 | SYSTOLIC BLOOD PRESSURE: 122 MMHG

## 2021-03-02 DIAGNOSIS — Z95.5 H/O HEART ARTERY STENT: ICD-10-CM

## 2021-03-02 DIAGNOSIS — K21.00 GASTROESOPHAGEAL REFLUX DISEASE WITH ESOPHAGITIS WITHOUT HEMORRHAGE: ICD-10-CM

## 2021-03-02 DIAGNOSIS — I34.1 MITRAL VALVE PROLAPSE: ICD-10-CM

## 2021-03-02 DIAGNOSIS — I25.10 CORONARY ARTERY DISEASE, ANGINA PRESENCE UNSPECIFIED, UNSPECIFIED VESSEL OR LESION TYPE, UNSPECIFIED WHETHER NATIVE OR TRANSPLANTED HEART: ICD-10-CM

## 2021-03-02 DIAGNOSIS — K21.9 GASTROESOPHAGEAL REFLUX DISEASE, UNSPECIFIED WHETHER ESOPHAGITIS PRESENT: ICD-10-CM

## 2021-03-02 DIAGNOSIS — S89.92XS INJURY OF LEFT LOWER EXTREMITY, SEQUELA: ICD-10-CM

## 2021-03-02 DIAGNOSIS — Z87.828 HISTORY OF MOTOR VEHICLE ACCIDENT: ICD-10-CM

## 2021-03-02 DIAGNOSIS — G47.33 OSA ON CPAP: ICD-10-CM

## 2021-03-02 DIAGNOSIS — F41.9 ANXIETY: Primary | ICD-10-CM

## 2021-03-02 DIAGNOSIS — E78.2 MIXED HYPERLIPIDEMIA: ICD-10-CM

## 2021-03-02 DIAGNOSIS — I10 ESSENTIAL HYPERTENSION: ICD-10-CM

## 2021-03-02 PROBLEM — S89.92XA INJURY OF LEFT LEG: Status: ACTIVE | Noted: 2021-03-02

## 2021-03-02 PROCEDURE — 99213 PR OFFICE/OUTPT VISIT, EST, LEVL III, 20-29 MIN: ICD-10-PCS | Mod: 25,S$GLB,, | Performed by: NURSE PRACTITIONER

## 2021-03-02 PROCEDURE — 93000 ELECTROCARDIOGRAM COMPLETE: CPT | Mod: S$GLB,,, | Performed by: INTERNAL MEDICINE

## 2021-03-02 PROCEDURE — 99213 OFFICE O/P EST LOW 20 MIN: CPT | Mod: 25,S$GLB,, | Performed by: NURSE PRACTITIONER

## 2021-03-02 PROCEDURE — 93000 EKG 12-LEAD: ICD-10-PCS | Mod: S$GLB,,, | Performed by: INTERNAL MEDICINE

## 2021-03-02 RX ORDER — SERTRALINE HYDROCHLORIDE 25 MG/1
25 TABLET, FILM COATED ORAL DAILY
Qty: 30 TABLET | Refills: 11 | Status: SHIPPED | OUTPATIENT
Start: 2021-03-02 | End: 2021-07-27

## 2021-03-15 ENCOUNTER — TELEPHONE (OUTPATIENT)
Dept: CARDIOLOGY | Facility: CLINIC | Age: 71
End: 2021-03-15

## 2021-03-17 ENCOUNTER — TELEPHONE (OUTPATIENT)
Dept: CARDIOLOGY | Facility: CLINIC | Age: 71
End: 2021-03-17

## 2021-03-18 ENCOUNTER — HOSPITAL ENCOUNTER (OUTPATIENT)
Dept: RADIOLOGY | Facility: CLINIC | Age: 71
Discharge: HOME | End: 2021-03-18
Attending: NURSE PRACTITIONER
Payer: MEDICARE

## 2021-03-18 ENCOUNTER — HOSPITAL ENCOUNTER (OUTPATIENT)
Dept: CARDIOLOGY | Facility: CLINIC | Age: 71
Discharge: HOME | End: 2021-03-18
Attending: NURSE PRACTITIONER
Payer: MEDICARE

## 2021-03-18 ENCOUNTER — TELEPHONE (OUTPATIENT)
Dept: CARDIOLOGY | Facility: CLINIC | Age: 71
End: 2021-03-18

## 2021-03-18 VITALS — WEIGHT: 168 LBS | HEIGHT: 60 IN | BODY MASS INDEX: 32.98 KG/M2

## 2021-03-18 DIAGNOSIS — S89.92XS INJURY OF LEFT LOWER EXTREMITY, SEQUELA: ICD-10-CM

## 2021-03-18 DIAGNOSIS — F41.9 ANXIETY: ICD-10-CM

## 2021-03-18 DIAGNOSIS — Z87.828 HISTORY OF MOTOR VEHICLE ACCIDENT: ICD-10-CM

## 2021-03-18 DIAGNOSIS — I25.10 CORONARY ARTERY DISEASE, ANGINA PRESENCE UNSPECIFIED, UNSPECIFIED VESSEL OR LESION TYPE, UNSPECIFIED WHETHER NATIVE OR TRANSPLANTED HEART: ICD-10-CM

## 2021-03-18 DIAGNOSIS — I10 ESSENTIAL HYPERTENSION: ICD-10-CM

## 2021-03-18 DIAGNOSIS — E78.2 MIXED HYPERLIPIDEMIA: ICD-10-CM

## 2021-03-18 PROCEDURE — 93015 CV STRESS TEST SUPVJ I&R: CPT | Mod: S$GLB,,, | Performed by: INTERNAL MEDICINE

## 2021-03-18 PROCEDURE — A9502 TC99M TETROFOSMIN: HCPCS | Mod: S$GLB,,, | Performed by: INTERNAL MEDICINE

## 2021-03-18 PROCEDURE — 78452 STRESS TEST WITH MYOCARDIAL PERFUSION (CUPID ONLY): ICD-10-PCS | Mod: S$GLB,,, | Performed by: INTERNAL MEDICINE

## 2021-03-18 PROCEDURE — 93015 STRESS TEST WITH MYOCARDIAL PERFUSION (CUPID ONLY): ICD-10-PCS | Mod: S$GLB,,, | Performed by: INTERNAL MEDICINE

## 2021-03-18 PROCEDURE — 93306 ECHO (CUPID ONLY): ICD-10-PCS | Mod: S$GLB,,, | Performed by: INTERNAL MEDICINE

## 2021-03-18 PROCEDURE — 93306 TTE W/DOPPLER COMPLETE: CPT | Mod: S$GLB,,, | Performed by: INTERNAL MEDICINE

## 2021-03-18 PROCEDURE — 78452 HT MUSCLE IMAGE SPECT MULT: CPT | Mod: S$GLB,,, | Performed by: INTERNAL MEDICINE

## 2021-03-18 PROCEDURE — A9502 STRESS TEST WITH MYOCARDIAL PERFUSION (CUPID ONLY): ICD-10-PCS | Mod: S$GLB,,, | Performed by: INTERNAL MEDICINE

## 2021-03-28 LAB
CV STRESS BASE HR: 80 BPM
DIASTOLIC BLOOD PRESSURE: 84 MMHG
EJECTION FRACTION- HIGH: 65 %
END DIASTOLIC INDEX-HIGH: 158 ML/M2
END DIASTOLIC INDEX-LOW: 94 ML/M2
END SYSTOLIC INDEX-HIGH: 71 ML/M2
END SYSTOLIC INDEX-LOW: 33 ML/M2
NUC STRESS DIASTOLIC VOLUME INDEX: 42
NUC STRESS EJECTION FRACTION: 80 %
NUC STRESS SYSTOLIC VOLUME INDEX: 9
OHS CV CPX 1 MINUTE RECOVERY HEART RATE: 122 BPM
OHS CV CPX 85 PERCENT MAX PREDICTED HEART RATE MALE: 121
OHS CV CPX ESTIMATED METS: 5
OHS CV CPX MAX PREDICTED HEART RATE: 143
OHS CV CPX PATIENT IS FEMALE: 1
OHS CV CPX PATIENT IS MALE: 0
OHS CV CPX PEAK DIASTOLIC BLOOD PRESSURE: 82 MMHG
OHS CV CPX PEAK HEAR RATE: 139 BPM
OHS CV CPX PEAK RATE PRESSURE PRODUCT: NORMAL
OHS CV CPX PEAK SYSTOLIC BLOOD PRESSURE: 170 MMHG
OHS CV CPX PERCENT MAX PREDICTED HEART RATE ACHIEVED: 97
OHS CV CPX RATE PRESSURE PRODUCT PRESENTING: 9600
RETIRED EF AND QEF - SEE NOTES: 53 %
STRESS ECHO POST EXERCISE DUR MIN: 3 MINUTES
STRESS ECHO POST EXERCISE DUR SEC: 0 SECONDS
SYSTOLIC BLOOD PRESSURE: 120 MMHG

## 2021-04-09 LAB
AORTIC ROOT ANNULUS: 2.7 CM
AORTIC VALVE CUSP SEPERATION: 1.5 CM
AV INDEX (PROSTH): 0.82
AV MEAN GRADIENT: 5 MMHG
AV PEAK GRADIENT: 9 MMHG
AV VALVE AREA: 2.32 CM2
AV VELOCITY RATIO: 0.73
BSA FOR ECHO PROCEDURE: 1.8 M2
CV ECHO LV RWT: 0.48 CM
DOP CALC AO PEAK VEL: 1.46 M/S
DOP CALC AO VTI: 34 CM
DOP CALC LVOT AREA: 2.8 CM2
DOP CALC LVOT DIAMETER: 1.9 CM
DOP CALC LVOT PEAK VEL: 1.06 M/S
DOP CALC LVOT STROKE VOLUME: 78.78 CM3
DOP CALCLVOT PEAK VEL VTI: 27.8 CM
E WAVE DECELERATION TIME: 206 MS
E/A RATIO: 0.84
E/E' RATIO: 10.24 M/S
ECHO LV POSTERIOR WALL: 1 CM (ref 0.6–1.1)
FRACTIONAL SHORTENING: 32 % (ref 28–44)
INTERVENTRICULAR SEPTUM: 1.11 CM (ref 0.6–1.1)
IVRT: 79 MS
LA MAJOR: 3.8 CM
LEFT ATRIUM SIZE: 4.1 CM
LEFT INTERNAL DIMENSION IN SYSTOLE: 2.84 CM (ref 2.1–4)
LEFT VENTRICLE MASS INDEX: 86 G/M2
LEFT VENTRICULAR INTERNAL DIMENSION IN DIASTOLE: 4.2 CM (ref 3.5–6)
LEFT VENTRICULAR MASS: 147.99 G
LV LATERAL E/E' RATIO: 8.7 M/S
LV SEPTAL E/E' RATIO: 12.43 M/S
MV PEAK A VEL: 1.04 M/S
MV PEAK E VEL: 0.87 M/S
PISA TR MAX VEL: 2.23 M/S
RA PRESSURE: 3 MMHG
RIGHT VENTRICULAR END-DIASTOLIC DIMENSION: 2.56 CM
TDI LATERAL: 0.1 M/S
TDI SEPTAL: 0.07 M/S
TDI: 0.09 M/S
TR MAX PG: 20 MMHG
TV REST PULMONARY ARTERY PRESSURE: 23 MMHG

## 2021-04-29 ENCOUNTER — PATIENT MESSAGE (OUTPATIENT)
Dept: CARDIOLOGY | Facility: CLINIC | Age: 71
End: 2021-04-29

## 2021-04-29 ENCOUNTER — PATIENT MESSAGE (OUTPATIENT)
Dept: RESEARCH | Facility: HOSPITAL | Age: 71
End: 2021-04-29

## 2021-05-17 ENCOUNTER — TELEPHONE (OUTPATIENT)
Dept: CARDIOLOGY | Facility: CLINIC | Age: 71
End: 2021-05-17

## 2021-06-21 ENCOUNTER — TELEPHONE (OUTPATIENT)
Dept: CARDIOLOGY | Facility: CLINIC | Age: 71
End: 2021-06-21

## 2021-06-30 ENCOUNTER — OFFICE VISIT (OUTPATIENT)
Dept: URGENT CARE | Facility: CLINIC | Age: 71
End: 2021-06-30
Payer: MEDICARE

## 2021-06-30 VITALS
RESPIRATION RATE: 16 BRPM | SYSTOLIC BLOOD PRESSURE: 153 MMHG | HEART RATE: 75 BPM | TEMPERATURE: 97 F | OXYGEN SATURATION: 98 % | DIASTOLIC BLOOD PRESSURE: 84 MMHG

## 2021-06-30 DIAGNOSIS — J01.90 ACUTE NON-RECURRENT SINUSITIS, UNSPECIFIED LOCATION: Primary | ICD-10-CM

## 2021-06-30 DIAGNOSIS — R43.9 PROBLEMS WITH SMELL AND TASTE: ICD-10-CM

## 2021-06-30 DIAGNOSIS — Z20.822 COVID-19 VIRUS NOT DETECTED: ICD-10-CM

## 2021-06-30 LAB
CTP QC/QA: YES
SARS-COV-2 RDRP RESP QL NAA+PROBE: NEGATIVE

## 2021-06-30 PROCEDURE — U0002: ICD-10-PCS | Mod: QW,CR,S$GLB, | Performed by: NURSE PRACTITIONER

## 2021-06-30 PROCEDURE — 99204 OFFICE O/P NEW MOD 45 MIN: CPT | Mod: S$GLB,,, | Performed by: NURSE PRACTITIONER

## 2021-06-30 PROCEDURE — 99204 PR OFFICE/OUTPT VISIT, NEW, LEVL IV, 45-59 MIN: ICD-10-PCS | Mod: S$GLB,,, | Performed by: NURSE PRACTITIONER

## 2021-06-30 PROCEDURE — U0002 COVID-19 LAB TEST NON-CDC: HCPCS | Mod: QW,CR,S$GLB, | Performed by: NURSE PRACTITIONER

## 2021-06-30 RX ORDER — DOXYCYCLINE 100 MG/1
100 CAPSULE ORAL 2 TIMES DAILY
Qty: 20 CAPSULE | Refills: 0 | Status: SHIPPED | OUTPATIENT
Start: 2021-06-30 | End: 2021-07-10

## 2021-07-23 RX ORDER — METOPROLOL SUCCINATE 25 MG/1
25 TABLET, EXTENDED RELEASE ORAL 2 TIMES DAILY
Qty: 180 TABLET | Refills: 3 | Status: SHIPPED | OUTPATIENT
Start: 2021-07-23

## 2021-07-27 ENCOUNTER — OFFICE VISIT (OUTPATIENT)
Dept: ORTHOPEDICS | Facility: CLINIC | Age: 71
End: 2021-07-27
Payer: MEDICARE

## 2021-07-27 VITALS — WEIGHT: 168 LBS | BODY MASS INDEX: 32.98 KG/M2 | HEIGHT: 60 IN

## 2021-07-27 DIAGNOSIS — M17.11 PRIMARY OSTEOARTHRITIS OF RIGHT KNEE: Primary | ICD-10-CM

## 2021-07-27 PROCEDURE — 99213 PR OFFICE/OUTPT VISIT, EST, LEVL III, 20-29 MIN: ICD-10-PCS | Mod: S$GLB,,, | Performed by: ORTHOPAEDIC SURGERY

## 2021-07-27 PROCEDURE — 99213 OFFICE O/P EST LOW 20 MIN: CPT | Mod: S$GLB,,, | Performed by: ORTHOPAEDIC SURGERY

## 2022-01-11 ENCOUNTER — INFUSION (OUTPATIENT)
Dept: INFUSION THERAPY | Facility: HOSPITAL | Age: 72
End: 2022-01-11
Attending: INTERNAL MEDICINE
Payer: MEDICARE

## 2022-01-11 VITALS
HEART RATE: 76 BPM | OXYGEN SATURATION: 98 % | SYSTOLIC BLOOD PRESSURE: 128 MMHG | WEIGHT: 180.81 LBS | HEIGHT: 59 IN | BODY MASS INDEX: 36.45 KG/M2 | DIASTOLIC BLOOD PRESSURE: 82 MMHG | TEMPERATURE: 98 F | RESPIRATION RATE: 16 BRPM

## 2022-01-11 DIAGNOSIS — M81.0 SENILE OSTEOPOROSIS: Primary | ICD-10-CM

## 2022-01-11 PROCEDURE — 63600175 PHARM REV CODE 636 W HCPCS: Performed by: INTERNAL MEDICINE

## 2022-01-11 PROCEDURE — 96365 THER/PROPH/DIAG IV INF INIT: CPT

## 2022-01-11 RX ORDER — ZOLEDRONIC ACID 5 MG/100ML
5 INJECTION, SOLUTION INTRAVENOUS
Status: COMPLETED | OUTPATIENT
Start: 2022-01-11 | End: 2022-01-11

## 2022-01-11 RX ORDER — ZOLEDRONIC ACID 5 MG/100ML
5 INJECTION, SOLUTION INTRAVENOUS
Status: CANCELLED
Start: 2022-01-11

## 2022-01-11 RX ADMIN — ZOLEDRONIC ACID 5 MG: 5 INJECTION, SOLUTION INTRAVENOUS at 03:01

## 2022-01-11 NOTE — PLAN OF CARE
Problem: Fatigue  Goal: Improved Activity Tolerance  Outcome: Ongoing, Progressing  Intervention: Promote Improved Energy  Flowsheets (Taken 1/11/2022 3358)  Fatigue Management: frequent rest breaks encouraged  Sleep/Rest Enhancement:   regular sleep/rest pattern promoted   relaxation techniques promoted  Activity Management: Ambulated -L4

## 2022-06-03 DIAGNOSIS — N63.14 BREAST LUMP ON RIGHT SIDE AT 4 O'CLOCK POSITION: Primary | ICD-10-CM

## 2022-06-03 DIAGNOSIS — N63.13 BREAST LUMP ON RIGHT SIDE AT 7 O'CLOCK POSITION: ICD-10-CM

## 2022-06-04 ENCOUNTER — ANESTHESIA EVENT (OUTPATIENT)
Dept: SURGERY | Facility: HOSPITAL | Age: 72
DRG: 480 | End: 2022-06-04
Payer: MEDICARE

## 2022-06-04 ENCOUNTER — HOSPITAL ENCOUNTER (INPATIENT)
Facility: HOSPITAL | Age: 72
LOS: 5 days | Discharge: NURSING, REHAB OR LONG-TERM CARE | DRG: 480 | End: 2022-06-09
Attending: EMERGENCY MEDICINE | Admitting: INTERNAL MEDICINE
Payer: MEDICARE

## 2022-06-04 DIAGNOSIS — I26.99 PULMONARY EMBOLISM: ICD-10-CM

## 2022-06-04 DIAGNOSIS — R07.9 CHEST PAIN: ICD-10-CM

## 2022-06-04 DIAGNOSIS — Z01.818 PRE-OPERATIVE CLEARANCE: ICD-10-CM

## 2022-06-04 DIAGNOSIS — M25.551 RIGHT HIP PAIN: ICD-10-CM

## 2022-06-04 DIAGNOSIS — S72.301A CLOSED FRACTURE OF SHAFT OF RIGHT FEMUR, UNSPECIFIED FRACTURE MORPHOLOGY, INITIAL ENCOUNTER: Primary | ICD-10-CM

## 2022-06-04 DIAGNOSIS — S72.331A CLOSED DISPLACED OBLIQUE FRACTURE OF SHAFT OF RIGHT FEMUR, INITIAL ENCOUNTER: ICD-10-CM

## 2022-06-04 LAB
ALBUMIN SERPL BCP-MCNC: 3.5 G/DL (ref 3.5–5.2)
ALP SERPL-CCNC: 64 U/L (ref 55–135)
ALT SERPL W/O P-5'-P-CCNC: 32 U/L (ref 10–44)
ANION GAP SERPL CALC-SCNC: 11 MMOL/L (ref 8–16)
AST SERPL-CCNC: 31 U/L (ref 10–40)
BASOPHILS # BLD AUTO: 0.07 K/UL (ref 0–0.2)
BASOPHILS NFR BLD: 0.5 % (ref 0–1.9)
BILIRUB SERPL-MCNC: 0.7 MG/DL (ref 0.1–1)
BUN SERPL-MCNC: 21 MG/DL (ref 8–23)
CALCIUM SERPL-MCNC: 9.3 MG/DL (ref 8.7–10.5)
CHLORIDE SERPL-SCNC: 110 MMOL/L (ref 95–110)
CO2 SERPL-SCNC: 21 MMOL/L (ref 23–29)
CREAT SERPL-MCNC: 0.8 MG/DL (ref 0.5–1.4)
DIFFERENTIAL METHOD: ABNORMAL
EOSINOPHIL # BLD AUTO: 0.1 K/UL (ref 0–0.5)
EOSINOPHIL NFR BLD: 0.4 % (ref 0–8)
ERYTHROCYTE [DISTWIDTH] IN BLOOD BY AUTOMATED COUNT: 12 % (ref 11.5–14.5)
EST. GFR  (AFRICAN AMERICAN): >60 ML/MIN/1.73 M^2
EST. GFR  (NON AFRICAN AMERICAN): >60 ML/MIN/1.73 M^2
GLUCOSE SERPL-MCNC: 149 MG/DL (ref 70–110)
HCT VFR BLD AUTO: 34.8 % (ref 37–48.5)
HGB BLD-MCNC: 11.7 G/DL (ref 12–16)
IMM GRANULOCYTES # BLD AUTO: 0.1 K/UL (ref 0–0.04)
IMM GRANULOCYTES NFR BLD AUTO: 0.7 % (ref 0–0.5)
LYMPHOCYTES # BLD AUTO: 3.3 K/UL (ref 1–4.8)
LYMPHOCYTES NFR BLD: 21.8 % (ref 18–48)
MCH RBC QN AUTO: 30.5 PG (ref 27–31)
MCHC RBC AUTO-ENTMCNC: 33.6 G/DL (ref 32–36)
MCV RBC AUTO: 91 FL (ref 82–98)
MONOCYTES # BLD AUTO: 0.9 K/UL (ref 0.3–1)
MONOCYTES NFR BLD: 5.8 % (ref 4–15)
NEUTROPHILS # BLD AUTO: 10.5 K/UL (ref 1.8–7.7)
NEUTROPHILS NFR BLD: 70.8 % (ref 38–73)
NRBC BLD-RTO: 0 /100 WBC
PLATELET # BLD AUTO: 250 K/UL (ref 150–450)
PMV BLD AUTO: 11.2 FL (ref 9.2–12.9)
POTASSIUM SERPL-SCNC: 3.9 MMOL/L (ref 3.5–5.1)
PROT SERPL-MCNC: 6.5 G/DL (ref 6–8.4)
RBC # BLD AUTO: 3.83 M/UL (ref 4–5.4)
SARS-COV-2 RDRP RESP QL NAA+PROBE: NEGATIVE
SODIUM SERPL-SCNC: 142 MMOL/L (ref 136–145)
WBC # BLD AUTO: 14.89 K/UL (ref 3.9–12.7)

## 2022-06-04 PROCEDURE — 63600175 PHARM REV CODE 636 W HCPCS: Performed by: EMERGENCY MEDICINE

## 2022-06-04 PROCEDURE — U0002 COVID-19 LAB TEST NON-CDC: HCPCS | Performed by: NURSE PRACTITIONER

## 2022-06-04 PROCEDURE — 93005 ELECTROCARDIOGRAM TRACING: CPT

## 2022-06-04 PROCEDURE — 85025 COMPLETE CBC W/AUTO DIFF WBC: CPT | Performed by: EMERGENCY MEDICINE

## 2022-06-04 PROCEDURE — 36415 COLL VENOUS BLD VENIPUNCTURE: CPT | Performed by: EMERGENCY MEDICINE

## 2022-06-04 PROCEDURE — 12000002 HC ACUTE/MED SURGE SEMI-PRIVATE ROOM

## 2022-06-04 PROCEDURE — 63600175 PHARM REV CODE 636 W HCPCS: Performed by: INTERNAL MEDICINE

## 2022-06-04 PROCEDURE — 94761 N-INVAS EAR/PLS OXIMETRY MLT: CPT

## 2022-06-04 PROCEDURE — 25000003 PHARM REV CODE 250: Performed by: INTERNAL MEDICINE

## 2022-06-04 PROCEDURE — 99900035 HC TECH TIME PER 15 MIN (STAT)

## 2022-06-04 PROCEDURE — 93010 EKG 12-LEAD: ICD-10-PCS | Mod: ,,, | Performed by: INTERNAL MEDICINE

## 2022-06-04 PROCEDURE — 99285 EMERGENCY DEPT VISIT HI MDM: CPT | Mod: 25

## 2022-06-04 PROCEDURE — 80053 COMPREHEN METABOLIC PANEL: CPT | Performed by: EMERGENCY MEDICINE

## 2022-06-04 PROCEDURE — 96375 TX/PRO/DX INJ NEW DRUG ADDON: CPT

## 2022-06-04 PROCEDURE — 25000003 PHARM REV CODE 250: Performed by: NURSE PRACTITIONER

## 2022-06-04 PROCEDURE — 96374 THER/PROPH/DIAG INJ IV PUSH: CPT

## 2022-06-04 PROCEDURE — 93010 ELECTROCARDIOGRAM REPORT: CPT | Mod: ,,, | Performed by: INTERNAL MEDICINE

## 2022-06-04 RX ORDER — ONDANSETRON 2 MG/ML
4 INJECTION INTRAMUSCULAR; INTRAVENOUS
Status: COMPLETED | OUTPATIENT
Start: 2022-06-04 | End: 2022-06-04

## 2022-06-04 RX ORDER — GLUCAGON 1 MG
1 KIT INJECTION
Status: DISCONTINUED | OUTPATIENT
Start: 2022-06-04 | End: 2022-06-09 | Stop reason: HOSPADM

## 2022-06-04 RX ORDER — METHOCARBAMOL 750 MG/1
750 TABLET, FILM COATED ORAL 4 TIMES DAILY
Status: DISCONTINUED | OUTPATIENT
Start: 2022-06-04 | End: 2022-06-04

## 2022-06-04 RX ORDER — AMOXICILLIN 250 MG
1 CAPSULE ORAL 2 TIMES DAILY
Status: DISCONTINUED | OUTPATIENT
Start: 2022-06-04 | End: 2022-06-09 | Stop reason: HOSPADM

## 2022-06-04 RX ORDER — ACETAMINOPHEN 325 MG/1
650 TABLET ORAL EVERY 8 HOURS PRN
Status: DISCONTINUED | OUTPATIENT
Start: 2022-06-04 | End: 2022-06-09 | Stop reason: HOSPADM

## 2022-06-04 RX ORDER — PANTOPRAZOLE SODIUM 40 MG/1
40 TABLET, DELAYED RELEASE ORAL DAILY
Status: DISCONTINUED | OUTPATIENT
Start: 2022-06-05 | End: 2022-06-09 | Stop reason: HOSPADM

## 2022-06-04 RX ORDER — MAG HYDROX/ALUMINUM HYD/SIMETH 200-200-20
30 SUSPENSION, ORAL (FINAL DOSE FORM) ORAL 4 TIMES DAILY PRN
Status: DISCONTINUED | OUTPATIENT
Start: 2022-06-04 | End: 2022-06-09 | Stop reason: HOSPADM

## 2022-06-04 RX ORDER — MORPHINE SULFATE 4 MG/ML
4 INJECTION, SOLUTION INTRAMUSCULAR; INTRAVENOUS EVERY 4 HOURS PRN
Status: DISCONTINUED | OUTPATIENT
Start: 2022-06-04 | End: 2022-06-09 | Stop reason: HOSPADM

## 2022-06-04 RX ORDER — HYDROMORPHONE HYDROCHLORIDE 2 MG/ML
1 INJECTION, SOLUTION INTRAMUSCULAR; INTRAVENOUS; SUBCUTANEOUS
Status: COMPLETED | OUTPATIENT
Start: 2022-06-04 | End: 2022-06-04

## 2022-06-04 RX ORDER — METHOCARBAMOL 750 MG/1
750 TABLET, FILM COATED ORAL 4 TIMES DAILY
Status: DISCONTINUED | OUTPATIENT
Start: 2022-06-04 | End: 2022-06-09 | Stop reason: HOSPADM

## 2022-06-04 RX ORDER — FERROUS SULFATE, DRIED 160(50) MG
1 TABLET, EXTENDED RELEASE ORAL 2 TIMES DAILY WITH MEALS
Status: DISCONTINUED | OUTPATIENT
Start: 2022-06-04 | End: 2022-06-09 | Stop reason: HOSPADM

## 2022-06-04 RX ORDER — METOPROLOL SUCCINATE 25 MG/1
25 TABLET, EXTENDED RELEASE ORAL 2 TIMES DAILY
Status: DISCONTINUED | OUTPATIENT
Start: 2022-06-04 | End: 2022-06-09 | Stop reason: HOSPADM

## 2022-06-04 RX ORDER — IBUPROFEN 200 MG
16 TABLET ORAL
Status: DISCONTINUED | OUTPATIENT
Start: 2022-06-04 | End: 2022-06-09 | Stop reason: HOSPADM

## 2022-06-04 RX ORDER — LANOLIN ALCOHOL/MO/W.PET/CERES
800 CREAM (GRAM) TOPICAL
Status: DISCONTINUED | OUTPATIENT
Start: 2022-06-04 | End: 2022-06-09 | Stop reason: HOSPADM

## 2022-06-04 RX ORDER — SODIUM,POTASSIUM PHOSPHATES 280-250MG
2 POWDER IN PACKET (EA) ORAL
Status: DISCONTINUED | OUTPATIENT
Start: 2022-06-04 | End: 2022-06-09 | Stop reason: HOSPADM

## 2022-06-04 RX ORDER — IBUPROFEN 200 MG
24 TABLET ORAL
Status: DISCONTINUED | OUTPATIENT
Start: 2022-06-04 | End: 2022-06-09 | Stop reason: HOSPADM

## 2022-06-04 RX ORDER — TALC
6 POWDER (GRAM) TOPICAL NIGHTLY PRN
Status: DISCONTINUED | OUTPATIENT
Start: 2022-06-04 | End: 2022-06-09 | Stop reason: HOSPADM

## 2022-06-04 RX ORDER — IPRATROPIUM BROMIDE AND ALBUTEROL SULFATE 2.5; .5 MG/3ML; MG/3ML
3 SOLUTION RESPIRATORY (INHALATION) EVERY 4 HOURS PRN
Status: DISCONTINUED | OUTPATIENT
Start: 2022-06-04 | End: 2022-06-09 | Stop reason: HOSPADM

## 2022-06-04 RX ORDER — SODIUM CHLORIDE 0.9 % (FLUSH) 0.9 %
10 SYRINGE (ML) INJECTION EVERY 12 HOURS PRN
Status: DISCONTINUED | OUTPATIENT
Start: 2022-06-04 | End: 2022-06-09 | Stop reason: HOSPADM

## 2022-06-04 RX ORDER — NALOXONE HCL 0.4 MG/ML
0.02 VIAL (ML) INJECTION
Status: DISCONTINUED | OUTPATIENT
Start: 2022-06-04 | End: 2022-06-09 | Stop reason: HOSPADM

## 2022-06-04 RX ORDER — SODIUM CHLORIDE, SODIUM LACTATE, POTASSIUM CHLORIDE, CALCIUM CHLORIDE 600; 310; 30; 20 MG/100ML; MG/100ML; MG/100ML; MG/100ML
INJECTION, SOLUTION INTRAVENOUS CONTINUOUS
Status: DISCONTINUED | OUTPATIENT
Start: 2022-06-05 | End: 2022-06-09 | Stop reason: HOSPADM

## 2022-06-04 RX ORDER — CYCLOBENZAPRINE HCL 5 MG
5 TABLET ORAL 3 TIMES DAILY PRN
Status: DISCONTINUED | OUTPATIENT
Start: 2022-06-04 | End: 2022-06-09 | Stop reason: HOSPADM

## 2022-06-04 RX ORDER — MORPHINE SULFATE 2 MG/ML
2 INJECTION, SOLUTION INTRAMUSCULAR; INTRAVENOUS EVERY 4 HOURS PRN
Status: DISCONTINUED | OUTPATIENT
Start: 2022-06-04 | End: 2022-06-05

## 2022-06-04 RX ORDER — EZETIMIBE 10 MG/1
10 TABLET ORAL NIGHTLY
Status: DISCONTINUED | OUTPATIENT
Start: 2022-06-04 | End: 2022-06-09 | Stop reason: HOSPADM

## 2022-06-04 RX ORDER — ONDANSETRON 2 MG/ML
4 INJECTION INTRAMUSCULAR; INTRAVENOUS EVERY 8 HOURS PRN
Status: DISCONTINUED | OUTPATIENT
Start: 2022-06-04 | End: 2022-06-09 | Stop reason: HOSPADM

## 2022-06-04 RX ORDER — PRAVASTATIN SODIUM 40 MG/1
80 TABLET ORAL DAILY
Status: DISCONTINUED | OUTPATIENT
Start: 2022-06-05 | End: 2022-06-09 | Stop reason: HOSPADM

## 2022-06-04 RX ORDER — SIMETHICONE 80 MG
1 TABLET,CHEWABLE ORAL 4 TIMES DAILY PRN
Status: DISCONTINUED | OUTPATIENT
Start: 2022-06-04 | End: 2022-06-09 | Stop reason: HOSPADM

## 2022-06-04 RX ADMIN — METOPROLOL SUCCINATE 25 MG: 25 TABLET, EXTENDED RELEASE ORAL at 08:06

## 2022-06-04 RX ADMIN — DOCUSATE SODIUM AND SENNOSIDES 1 TABLET: 8.6; 5 TABLET, FILM COATED ORAL at 08:06

## 2022-06-04 RX ADMIN — HYDROMORPHONE HYDROCHLORIDE 1 MG: 2 INJECTION, SOLUTION INTRAMUSCULAR; INTRAVENOUS; SUBCUTANEOUS at 11:06

## 2022-06-04 RX ADMIN — METHOCARBAMOL TABLETS 750 MG: 750 TABLET, COATED ORAL at 08:06

## 2022-06-04 RX ADMIN — MORPHINE SULFATE 4 MG: 4 INJECTION INTRAVENOUS at 04:06

## 2022-06-04 RX ADMIN — EZETIMIBE 10 MG: 10 TABLET ORAL at 08:06

## 2022-06-04 RX ADMIN — MORPHINE SULFATE 4 MG: 4 INJECTION INTRAVENOUS at 09:06

## 2022-06-04 RX ADMIN — SODIUM CHLORIDE, SODIUM LACTATE, POTASSIUM CHLORIDE, AND CALCIUM CHLORIDE: .6; .31; .03; .02 INJECTION, SOLUTION INTRAVENOUS at 11:06

## 2022-06-04 RX ADMIN — ONDANSETRON 4 MG: 2 INJECTION INTRAMUSCULAR; INTRAVENOUS at 01:06

## 2022-06-04 NOTE — ASSESSMENT & PLAN NOTE
Patient with known CAD s/p stent placement, which is controlled Will continue Statin and monitor for S/Sx of angina/ACS. Continue to monitor on telemetry.   Hold chronic ASA until after surgery.

## 2022-06-04 NOTE — SUBJECTIVE & OBJECTIVE
Past Medical History:   Diagnosis Date    Anxiety     Constipation     Coronary artery disease     Esophageal stricture     GERD (gastroesophageal reflux disease)     indefinite PPI    Hyperlipidemia     Hypertension     Mitral valve prolapse     ARLETH on CPAP     Osteoporosis        Past Surgical History:   Procedure Laterality Date    APPENDECTOMY      BREAST BIOPSY      BREAST CYST ASPIRATION      BREAST SURGERY  12/2010    Reduction    cardiovascular stress testing  09/19/2017    CHOLECYSTECTOMY  12/13/2017    Laparoscopic- Dr Lai    CORONARY STENT PLACEMENT  2007    echocardiography  09/19/2017    ESOPHAGOGASTRODUODENOSCOPY      TONSILLECTOMY      TOTAL REDUCTION MAMMOPLASTY      TUBAL LIGATION         Review of patient's allergies indicates:  No Known Allergies    No current facility-administered medications on file prior to encounter.     Current Outpatient Medications on File Prior to Encounter   Medication Sig    aspirin (ECOTRIN) 81 MG EC tablet Take 81 mg by mouth once daily.    calcium-vitamin D3 (CALCIUM 500 + D) 500 mg(1,250mg) -200 unit per tablet Take 1 tablet by mouth 2 (two) times daily with meals.     ergocalciferol (ERGOCALCIFEROL) 50,000 unit Cap Take 50,000 Units by mouth every 30 days. TWICE MONTHLY    ezetimibe (ZETIA) 10 mg tablet Take 10 mg by mouth once daily.    metoprolol succinate (TOPROL-XL) 25 MG 24 hr tablet Take 1 tablet (25 mg total) by mouth 2 (two) times a day.    multivitamin (ONE DAILY MULTIVITAMIN) per tablet Take 1 tablet by mouth 2 (two) times a day.     nitroGLYCERIN (NITROSTAT) 0.4 MG SL tablet Place 0.4 mg under the tongue every 5 (five) minutes as needed for Chest pain.    pantoprazole (PROTONIX) 40 MG tablet TAKE 1 TABLET BY MOUTH EVERY DAY (Patient taking differently: Take 40 mg by mouth once daily. )    rosuvastatin (CRESTOR) 40 MG Tab Take 40 mg by mouth once daily.      Family History       Problem Relation (Age of Onset)    Breast cancer Mother    Cancer Brother     Diabetes Brother, Maternal Uncle    Hyperlipidemia Mother, Father, Brother    Hypertension Mother    Lung cancer Father, Paternal Uncle    Stroke Maternal Grandmother          Tobacco Use    Smoking status: Never Smoker    Smokeless tobacco: Never Used   Substance and Sexual Activity    Alcohol use: Yes     Comment: occ    Drug use: No    Sexual activity: Never     Review of Systems   Constitutional:  Negative for appetite change, chills and fever.   HENT:  Negative for congestion and rhinorrhea.    Respiratory:  Negative for cough, shortness of breath and wheezing.    Cardiovascular:  Negative for chest pain and palpitations.   Gastrointestinal:  Negative for abdominal pain, diarrhea, nausea and vomiting.   Genitourinary:  Negative for dysuria and hematuria.   Musculoskeletal:  Positive for arthralgias and gait problem.   Skin:  Positive for wound. Negative for color change.   Neurological:  Negative for weakness and headaches.   Psychiatric/Behavioral:  Negative for agitation and confusion.    All other systems reviewed and are negative.  Objective:     Vital Signs (Most Recent):  Temp: 98.2 °F (36.8 °C) (06/04/22 1122)  Pulse: 63 (06/04/22 1320)  Resp: 16 (06/04/22 1320)  BP: 131/62 (06/04/22 1320)  SpO2: 98 % (06/04/22 1319) Vital Signs (24h Range):  Temp:  [98.2 °F (36.8 °C)] 98.2 °F (36.8 °C)  Pulse:  [63-88] 63  Resp:  [16-18] 16  SpO2:  [95 %-98 %] 98 %  BP: (131-151)/(55-62) 131/62     Weight: 81.6 kg (180 lb)  Body mass index is 36.36 kg/m².    Physical Exam  Vitals and nursing note reviewed.   Constitutional:       General: She is not in acute distress.     Appearance: She is well-developed.   HENT:      Head: Normocephalic and atraumatic.      Nose:      Comments: Abrasion to the bridge of nose  Eyes:      Conjunctiva/sclera: Conjunctivae normal.      Pupils: Pupils are equal, round, and reactive to light.   Cardiovascular:      Rate and Rhythm: Normal rate and regular rhythm.      Pulses: Normal  pulses.   Pulmonary:      Effort: Pulmonary effort is normal. No respiratory distress.      Breath sounds: Normal breath sounds.   Abdominal:      General: Bowel sounds are normal. There is no distension.      Palpations: Abdomen is soft.      Tenderness: There is no abdominal tenderness.   Genitourinary:     Comments: Deferred    Musculoskeletal:         General: Swelling (right thigh) and tenderness (right thigh) present.      Cervical back: Normal range of motion and neck supple.      Comments: RLE externally rotated   Skin:     General: Skin is warm and dry.      Capillary Refill: Capillary refill takes 2 to 3 seconds.      Findings: No rash.   Neurological:      General: No focal deficit present.      Mental Status: She is alert and oriented to person, place, and time.   Psychiatric:         Mood and Affect: Mood normal.         Behavior: Behavior normal.         Thought Content: Thought content normal.         CRANIAL NERVES     CN III, IV, VI   Pupils are equal, round, and reactive to light.     Significant Labs: All pertinent labs within the past 24 hours have been reviewed.  CBC:   Recent Labs   Lab 06/04/22  1213   WBC 14.89*   HGB 11.7*   HCT 34.8*        CMP:   Recent Labs   Lab 06/04/22  1213      K 3.9      CO2 21*   *   BUN 21   CREATININE 0.8   CALCIUM 9.3   PROT 6.5   ALBUMIN 3.5   BILITOT 0.7   ALKPHOS 64   AST 31   ALT 32   ANIONGAP 11   EGFRNONAA >60       Significant Imaging: I have reviewed all pertinent imaging results/findings within the past 24 hours.  Xray right femur:Oblique fracture of the midshaft right femur with side to side apposition and mild bayonet shortening.  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  Soft tissue calcification adjacent to the great right greater trochanter but fracture of the right hip or pelvis is not seen.    CXR: No acute abnormality.

## 2022-06-04 NOTE — ASSESSMENT & PLAN NOTE
Chronic, controlled.  Latest blood pressure and vitals reviewed-   Temp:  [98.2 °F (36.8 °C)]   Pulse:  [63-88]   Resp:  [16-18]   BP: (131-151)/(55-62)   SpO2:  [95 %-98 %] .   Home meds for hypertension were reviewed and noted below.   Hypertension Medications             metoprolol succinate (TOPROL-XL) 25 MG 24 hr tablet Take 1 tablet (25 mg total) by mouth 2 (two) times a day.    nitroGLYCERIN (NITROSTAT) 0.4 MG SL tablet Place 0.4 mg under the tongue every 5 (five) minutes as needed for Chest pain.          While in the hospital, will manage blood pressure as follows; Continue home antihypertensive regimen    Will utilize p.r.n. blood pressure medication only if patient's blood pressure greater than  180/110 and she develops symptoms such as worsening chest pain or shortness of breath.

## 2022-06-04 NOTE — ASSESSMENT & PLAN NOTE
Consult orthopedic surgery  NPO at [place]-surgery planned for am.  Pain control  Barber's traction per orthopedic recommendation  PT/OT consult following surgery  CM for dc planning

## 2022-06-04 NOTE — H&P
[facility] Medicine  History & Physical    Patient Name: Aimee Palumbo  MRN: [id number]  Patient Class: IP- Inpatient  Admission Date: 6/4/2022  Attending Physician: Tanner Chappell MD   Primary Care Provider: Amor Lamas Jr, MD         Patient information was obtained from patient and ER records.     Subjective:     Principal Problem:Closed fracture of shaft of right femur    Chief Complaint:   Chief Complaint   Patient presents with    Fall     Trip and fall earlier today, right leg pain from hip to knee rated 10/10; + shortening and rotation         HPI: Aimee Palumbo is a 71-year-old female with past medical history significant for coronary artery disease, GERD, hyperlipidemia, hypertension, obstructive sleep apnea on CPAP, and osteoporosis who presented to the emergency department today after a mechanical fall at home for further evaluation right lower extremity pain.  Pt states that she was at the home of a friend when she tripped and fell on the sidewalk.  ED evaluation was significant for a closed displaced right midshaft femur fracture.  Per report, the orthopedic surgeon on-call was contacted who states he will take her to surgery tomorrow morning.  Patient has no further complaints at this time.      Past Medical History:   Diagnosis Date    Anxiety     Constipation     Coronary artery disease     Esophageal stricture     GERD (gastroesophageal reflux disease)     indefinite PPI    Hyperlipidemia     Hypertension     Mitral valve prolapse     ARLETH on CPAP     Osteoporosis        Past Surgical History:   Procedure Laterality Date    APPENDECTOMY      BREAST BIOPSY      BREAST CYST ASPIRATION      BREAST SURGERY  12/2010    Reduction    cardiovascular stress testing  09/19/2017    CHOLECYSTECTOMY  12/13/2017    Laparoscopic- Dr Lai    CORONARY STENT PLACEMENT  2007    echocardiography  09/19/2017    ESOPHAGOGASTRODUODENOSCOPY      TONSILLECTOMY       TOTAL REDUCTION MAMMOPLASTY      TUBAL LIGATION         Review of patient's allergies indicates:  No Known Allergies    No current facility-administered medications on file prior to encounter.     Current Outpatient Medications on File Prior to Encounter   Medication Sig    aspirin (ECOTRIN) 81 MG EC tablet Take 81 mg by mouth once daily.    calcium-vitamin D3 (CALCIUM 500 + D) 500 mg(1,250mg) -200 unit per tablet Take 1 tablet by mouth 2 (two) times daily with meals.     ergocalciferol (ERGOCALCIFEROL) 50,000 unit Cap Take 50,000 Units by mouth every 30 days. TWICE MONTHLY    ezetimibe (ZETIA) 10 mg tablet Take 10 mg by mouth once daily.    metoprolol succinate (TOPROL-XL) 25 MG 24 hr tablet Take 1 tablet (25 mg total) by mouth 2 (two) times a day.    multivitamin (ONE DAILY MULTIVITAMIN) per tablet Take 1 tablet by mouth 2 (two) times a day.     nitroGLYCERIN (NITROSTAT) 0.4 MG SL tablet Place 0.4 mg under the tongue every 5 (five) minutes as needed for Chest pain.    pantoprazole (PROTONIX) 40 MG tablet TAKE 1 TABLET BY MOUTH EVERY DAY (Patient taking differently: Take 40 mg by mouth once daily. )    rosuvastatin (CRESTOR) 40 MG Tab Take 40 mg by mouth once daily.      Family History       Problem Relation (Age of Onset)    Breast cancer Mother    Cancer Brother    Diabetes Brother, Maternal Uncle    Hyperlipidemia Mother, Father, Brother    Hypertension Mother    Lung cancer Father, Paternal Uncle    Stroke Maternal Grandmother          Tobacco Use    Smoking status: Never Smoker    Smokeless tobacco: Never Used   Substance and Sexual Activity    Alcohol use: Yes     Comment: occ    Drug use: No    Sexual activity: Never     Review of Systems   Constitutional:  Negative for appetite change, chills and fever.   HENT:  Negative for congestion and rhinorrhea.    Respiratory:  Negative for cough, shortness of breath and wheezing.    Cardiovascular:  Negative for chest pain and palpitations.    Gastrointestinal:  Negative for abdominal pain, diarrhea, nausea and vomiting.   Genitourinary:  Negative for dysuria and hematuria.   Musculoskeletal:  Positive for arthralgias and gait problem.   Skin:  Positive for wound. Negative for color change.   Neurological:  Negative for weakness and headaches.   Psychiatric/Behavioral:  Negative for agitation and confusion.    All other systems reviewed and are negative.  Objective:     Vital Signs (Most Recent):  Temp: 98.2 °F (36.8 °C) (06/04/22 1122)  Pulse: 63 (06/04/22 1320)  Resp: 16 (06/04/22 1320)  BP: 131/62 (06/04/22 1320)  SpO2: 98 % (06/04/22 1319) Vital Signs (24h Range):  Temp:  [98.2 °F (36.8 °C)] 98.2 °F (36.8 °C)  Pulse:  [63-88] 63  Resp:  [16-18] 16  SpO2:  [95 %-98 %] 98 %  BP: (131-151)/(55-62) 131/62     Weight: 81.6 kg (180 lb)  Body mass index is 36.36 kg/m².    Physical Exam  Vitals and nursing note reviewed.   Constitutional:       General: She is not in acute distress.     Appearance: She is well-developed.   HENT:      Head: Normocephalic and atraumatic.      Nose:      Comments: Abrasion to the bridge of nose  Eyes:      Conjunctiva/sclera: Conjunctivae normal.      Pupils: Pupils are equal, round, and reactive to light.   Cardiovascular:      Rate and Rhythm: Normal rate and regular rhythm.      Pulses: Normal pulses.   Pulmonary:      Effort: Pulmonary effort is normal. No respiratory distress.      Breath sounds: Normal breath sounds.   Abdominal:      General: Bowel sounds are normal. There is no distension.      Palpations: Abdomen is soft.      Tenderness: There is no abdominal tenderness.   Genitourinary:     Comments: Deferred    Musculoskeletal:         General: Swelling (right thigh) and tenderness (right thigh) present.      Cervical back: Normal range of motion and neck supple.      Comments: RLE externally rotated   Skin:     General: Skin is warm and dry.      Capillary Refill: Capillary refill takes 2 to 3 seconds.       Findings: No rash.   Neurological:      General: No focal deficit present.      Mental Status: She is alert and oriented to person, place, and time.   Psychiatric:         Mood and Affect: Mood normal.         Behavior: Behavior normal.         Thought Content: Thought content normal.         CRANIAL NERVES     CN III, IV, VI   Pupils are equal, round, and reactive to light.     Significant Labs: All pertinent labs within the past 24 hours have been reviewed.  CBC:   Recent Labs   Lab 06/04/22  1213   WBC 14.89*   HGB 11.7*   HCT 34.8*        CMP:   Recent Labs   Lab 06/04/22  1213      K 3.9      CO2 21*   *   BUN 21   CREATININE 0.8   CALCIUM 9.3   PROT 6.5   ALBUMIN 3.5   BILITOT 0.7   ALKPHOS 64   AST 31   ALT 32   ANIONGAP 11   EGFRNONAA >60       Significant Imaging: I have reviewed all pertinent imaging results/findings within the past 24 hours.  Xray right femur:Oblique fracture of the midshaft right femur with side to side apposition and mild bayonet shortening.  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  Soft tissue calcification adjacent to the great right greater trochanter but fracture of the right hip or pelvis is not seen.    CXR: No acute abnormality.           Assessment/Plan:     * Closed fracture of shaft of right femur  Consult orthopedic surgery  NPO at [place]-surgery planned for am.  Pain control  Barber's traction per orthopedic recommendation  PT/OT consult following surgery  CM for dc planning      ARLETH on CPAP  Chronic; continue chronic CPAP      Obesity  Body mass index is 36.36 kg/m². Morbid obesity complicates all aspects of disease management from diagnostic modalities to treatment. Weight loss encouraged and health benefits explained to patient.      Coronary artery disease  Patient with known CAD s/p stent placement, which is controlled Will continue Statin and monitor for S/Sx of angina/ACS.  Continue to monitor on telemetry.   Hold chronic ASA until after surgery.      Gastroesophageal reflux disease with esophagitis  Chronic; stable; continue chronic PPI      Hyperlipidemia   Patient is chronically on statin.will continue for now. Monitor clinically. Last LDL was No results found for: LDLCALC         Hypertension  Chronic, controlled.  Latest blood pressure and vitals reviewed-   Temp:  [98.2 °F (36.8 °C)]   Pulse:  [63-88]   Resp:  [16-18]   BP: (131-151)/(55-62)   SpO2:  [95 %-98 %] .   Home meds for hypertension were reviewed and noted below.   Hypertension Medications             metoprolol succinate (TOPROL-XL) 25 MG 24 hr tablet Take 1 tablet (25 mg total) by mouth 2 (two) times a day.    nitroGLYCERIN (NITROSTAT) 0.4 MG SL tablet Place 0.4 mg under the tongue every 5 (five) minutes as needed for Chest pain.          While in the hospital, will manage blood pressure as follows; Continue home antihypertensive regimen    Will utilize p.r.n. blood pressure medication only if patient's blood pressure greater than  180/110 and she develops symptoms such as worsening chest pain or shortness of breath.        Osteoporosis  Chronic; continue daily calcium/vit d supplement        VTE Risk Mitigation (From admission, onward)    SCDs/foot pump             WADE De La Cruz  Department of Hospital Medicine   [facility] - Emergency Dept

## 2022-06-04 NOTE — ED PROVIDER NOTES
Encounter Date: 6/4/2022       History     Chief Complaint   Patient presents with    Fall     Trip and fall earlier today, right leg pain from hip to knee rated 10/10; + shortening and rotation      Patient is a 71-year-old female with a past medical history of osteoporosis coronary artery disease hypertension hyperlipidemia tripped and fell and has right mid shaft femur pain.  She has no other real complaints at this time.  She does have some swelling in the mid thigh.  She was given morphine x2 EN route as well as Zofran.  She takes aspirin but no other blood thinners.  She has no headache neck pain chest pain abdominal pain.  She does have a small abrasion over the bridge of her nose where her glasses are.  No injury to any other extremities.  She last ate during breakfast        Review of patient's allergies indicates:  No Known Allergies  Past Medical History:   Diagnosis Date    Anxiety     Constipation     Coronary artery disease     Esophageal stricture     GERD (gastroesophageal reflux disease)     indefinite PPI    Hyperlipidemia     Hypertension     Mitral valve prolapse     ARLETH on CPAP     Osteoporosis      Past Surgical History:   Procedure Laterality Date    APPENDECTOMY      BREAST BIOPSY      BREAST CYST ASPIRATION      BREAST SURGERY  12/2010    Reduction    cardiovascular stress testing  09/19/2017    CHOLECYSTECTOMY  12/13/2017    Laparoscopic- Dr Lai    CORONARY STENT PLACEMENT  2007    echocardiography  09/19/2017    ESOPHAGOGASTRODUODENOSCOPY      TONSILLECTOMY      TOTAL REDUCTION MAMMOPLASTY      TUBAL LIGATION       Family History   Problem Relation Age of Onset    Breast cancer Mother     Hyperlipidemia Mother     Hypertension Mother     Lung cancer Father     Hyperlipidemia Father     Diabetes Brother     Hyperlipidemia Brother     Cancer Brother         lymphoma    Diabetes Maternal Uncle     Lung cancer Paternal Uncle     Stroke Maternal  Grandmother      Social History     Tobacco Use    Smoking status: Never Smoker    Smokeless tobacco: Never Used   Substance Use Topics    Alcohol use: Yes     Comment: occ    Drug use: No     Review of Systems   Constitutional: Negative for fatigue and fever.   HENT: Negative for ear pain, rhinorrhea and sore throat.    Eyes: Negative for pain and visual disturbance.   Respiratory: Negative for cough and shortness of breath.    Cardiovascular: Negative for chest pain.   Gastrointestinal: Negative for abdominal pain, diarrhea, nausea and vomiting.   Genitourinary: Negative for difficulty urinating and dysuria.   Musculoskeletal: Positive for arthralgias and gait problem. Negative for neck pain.   Skin: Negative for rash.        Abrasion to the bridge of the nose   Neurological: Negative for weakness, light-headedness, numbness and headaches.   All other systems reviewed and are negative.      Physical Exam     Initial Vitals [06/04/22 1122]   BP Pulse Resp Temp SpO2   (!) 151/55 88 16 98.2 °F (36.8 °C) 95 %      MAP       --         Physical Exam    Nursing note and vitals reviewed.  Constitutional: She appears well-developed and well-nourished.  Non-toxic appearance.   HENT:   Head: Normocephalic and atraumatic.   Mouth/Throat: Oropharynx is clear and moist.   Eyes: EOM are normal. Pupils are equal, round, and reactive to light.   Neck: Neck supple.   Cardiovascular: Normal rate, regular rhythm, normal heart sounds and intact distal pulses. Exam reveals no gallop and no friction rub.    No murmur heard.  Pulmonary/Chest: Breath sounds normal. No respiratory distress. She has no decreased breath sounds. She has no wheezes. She has no rhonchi. She has no rales.   Abdominal: Abdomen is soft. Bowel sounds are normal. She exhibits no distension. There is no abdominal tenderness.   Musculoskeletal:         General: Tenderness (Tenderness over the mid femur.  Right lower extremity is shortened externally rotated.)  present. Normal range of motion.      Cervical back: Neck supple.     Neurological: She is alert and oriented to person, place, and time. She has normal strength. No sensory deficit.   Patient able wiggle her toes.  Sensation is intact.   Skin: Skin is warm and dry.   Psychiatric: She has a normal mood and affect.         ED Course   Procedures  Labs Reviewed   CBC W/ AUTO DIFFERENTIAL - Abnormal; Notable for the following components:       Result Value    WBC 14.89 (*)     RBC 3.83 (*)     Hemoglobin 11.7 (*)     Hematocrit 34.8 (*)     Immature Granulocytes 0.7 (*)     Gran # (ANC) 10.5 (*)     Immature Grans (Abs) 0.10 (*)     All other components within normal limits   COMPREHENSIVE METABOLIC PANEL - Abnormal; Notable for the following components:    CO2 21 (*)     Glucose 149 (*)     All other components within normal limits   SARS-COV-2 RNA AMPLIFICATION, QUAL          Imaging Results          X-Ray Hip 2 or 3 views Right (with Pelvis when performed) (Final result)  Result time 06/04/22 12:18:12    Final result by Marty Thapa Jr., MD (06/04/22 12:18:12)                 Impression:      Oblique fracture of the midshaft right femur with bayonet shortening.  Soft tissue calcification adjacent to the great right greater trochanter but fracture of the right hip or pelvis is not seen.      Electronically signed by: Marty Thapa MD  Date:    06/04/2022  Time:    12:18             Narrative:    EXAMINATION:  XR HIP WITH PELVIS WHEN PERFORMED, 2 OR 3  VIEWS RIGHT    CLINICAL HISTORY:  Pain in right hip    TECHNIQUE:  AP view of the pelvis and frog leg lateral view of the right hip were performed.    COMPARISON:  None    FINDINGS:  At the right hip joint a fracture or dislocation is not seen.  The patient is seen to have an oblique fracture of the midshaft right femur with bayonet shortening.  A soft tissue calcification is noted adjacent to the greater trochanter.  Dislocation is not seen.  The bones of the  pelvis and the left hip are intact.                               X-Ray Femur Ap/Lat Right (Final result)  Result time 06/04/22 12:17:15    Final result by Marty Thapa Jr., MD (06/04/22 12:17:15)                 Impression:      Oblique fracture of the midshaft right femur with side to side apposition and mild bayonet shortening.  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    COMPARISON:  None    FINDINGS:  There is an oblique fracture of the midshaft of the right femur with side to side apposition and mild bayonet shortening.  The right hip appears intact although there is calcification adjacent to the greater trochanter.  DJD is noted at the knee.                               X-Ray Chest AP Portable (Final result)  Result time 06/04/22 12:16:04    Final result by Marty Thapa Jr., MD (06/04/22 12:16:04)                 Impression:      No acute abnormality.      Electronically signed by: Marty Thapa MD  Date:    06/04/2022  Time:    12:16             Narrative:    EXAMINATION:  XR CHEST AP PORTABLE    CLINICAL HISTORY:  Encounter for other preprocedural examination    TECHNIQUE:  Single frontal view of the chest was performed.    COMPARISON:  Chest of November 27, 2017    FINDINGS:  The lungs are clear, with normal appearance of pulmonary vasculature and no pleural effusion or pneumothorax.    The cardiac silhouette is normal in size. The hilar and mediastinal contours are unremarkable.    Bones are intact.                                 Medications   morphine injection 2 mg (has no administration in time range)   morphine injection 4 mg (has no administration in time range)   methocarbamoL tablet 750 mg (has no administration in time range)   HYDROmorphone  (PF) injection 1 mg (1 mg Intravenous Given 6/4/22 1144)   ondansetron injection 4 mg (4 mg Intravenous Given 6/4/22 1300)     Medical Decision Making:   Unfortunately patient has a femur fracture.  I spoke to the orthopedist on-call, Dr. Cristobal,  as well as the hospitalist.  The patient will be admitted.  Labs pending at this time.  X-ray shows mid shaft femur fracture.  She is neurovascularly intact.  She will need medical clearance.  Anticipate surgery in the morning per Orthopedics.  Buck's traction necessary             ED Course as of 06/04/22 1614   Sat Jun 04, 2022   1613 EKG independently interpreted by me rate 60 normal sinus rate rhythm axis and intervals with no ST segment elevation or depression. [JS]      ED Course User Index  [JS] Bobby Gutiérrez MD             Clinical Impression:   Final diagnoses:  [Z01.818] Pre-operative clearance  [M25.551] Right hip pain  [S72.301A] Closed fracture of shaft of right femur, unspecified fracture morphology, initial encounter (Primary)          ED Disposition Condition    Admit               Bobby Gutiérrez MD  06/04/22 1244       Bobby Gutiérrez MD  06/04/22 1614

## 2022-06-04 NOTE — HPI
Aimee Palumbo is a 71-year-old female with past medical history significant for coronary artery disease, GERD, hyperlipidemia, hypertension, obstructive sleep apnea on CPAP, and osteoporosis who presented to the emergency department today after a mechanical fall at home for further evaluation right lower extremity pain.  Pt states that she was at the home of a friend when she tripped and fell on the sidewalk.  ED evaluation was significant for a closed displaced right midshaft femur fracture.  Per report, the orthopedic surgeon on-call was contacted who states he will take her to surgery tomorrow morning.  Patient has no further complaints at this time.

## 2022-06-04 NOTE — ASSESSMENT & PLAN NOTE
Body mass index is 36.36 kg/m². Morbid obesity complicates all aspects of disease management from diagnostic modalities to treatment. Weight loss encouraged and health benefits explained to patient.

## 2022-06-05 ENCOUNTER — ANESTHESIA (OUTPATIENT)
Dept: SURGERY | Facility: HOSPITAL | Age: 72
DRG: 480 | End: 2022-06-05
Payer: MEDICARE

## 2022-06-05 LAB
ABO + RH BLD: NORMAL
ALBUMIN SERPL BCP-MCNC: 3.2 G/DL (ref 3.5–5.2)
ALP SERPL-CCNC: 52 U/L (ref 55–135)
ALT SERPL W/O P-5'-P-CCNC: 28 U/L (ref 10–44)
ANION GAP SERPL CALC-SCNC: 10 MMOL/L (ref 8–16)
APTT BLDCRRT: 25.5 SEC (ref 21–32)
AST SERPL-CCNC: 27 U/L (ref 10–40)
BACTERIA #/AREA URNS HPF: ABNORMAL /HPF
BASOPHILS # BLD AUTO: 0.04 K/UL (ref 0–0.2)
BASOPHILS NFR BLD: 0.3 % (ref 0–1.9)
BILIRUB SERPL-MCNC: 0.7 MG/DL (ref 0.1–1)
BILIRUB UR QL STRIP: NEGATIVE
BLD GP AB SCN CELLS X3 SERPL QL: NORMAL
BUN SERPL-MCNC: 19 MG/DL (ref 8–23)
CALCIUM SERPL-MCNC: 8.8 MG/DL (ref 8.7–10.5)
CHLORIDE SERPL-SCNC: 107 MMOL/L (ref 95–110)
CLARITY UR: CLEAR
CO2 SERPL-SCNC: 23 MMOL/L (ref 23–29)
COLOR UR: YELLOW
CREAT SERPL-MCNC: 0.8 MG/DL (ref 0.5–1.4)
DIFFERENTIAL METHOD: ABNORMAL
EOSINOPHIL # BLD AUTO: 0 K/UL (ref 0–0.5)
EOSINOPHIL NFR BLD: 0.2 % (ref 0–8)
ERYTHROCYTE [DISTWIDTH] IN BLOOD BY AUTOMATED COUNT: 12.3 % (ref 11.5–14.5)
EST. GFR  (AFRICAN AMERICAN): >60 ML/MIN/1.73 M^2
EST. GFR  (NON AFRICAN AMERICAN): >60 ML/MIN/1.73 M^2
GLUCOSE SERPL-MCNC: 114 MG/DL (ref 70–110)
GLUCOSE UR QL STRIP: NEGATIVE
HCT VFR BLD AUTO: 29.5 % (ref 37–48.5)
HGB BLD-MCNC: 9.7 G/DL (ref 12–16)
HGB UR QL STRIP: ABNORMAL
IMM GRANULOCYTES # BLD AUTO: 0.04 K/UL (ref 0–0.04)
IMM GRANULOCYTES NFR BLD AUTO: 0.3 % (ref 0–0.5)
INR PPP: 1 (ref 0.8–1.2)
KETONES UR QL STRIP: NEGATIVE
LEUKOCYTE ESTERASE UR QL STRIP: NEGATIVE
LYMPHOCYTES # BLD AUTO: 2.9 K/UL (ref 1–4.8)
LYMPHOCYTES NFR BLD: 23.8 % (ref 18–48)
MAGNESIUM SERPL-MCNC: 2 MG/DL (ref 1.6–2.6)
MCH RBC QN AUTO: 30.4 PG (ref 27–31)
MCHC RBC AUTO-ENTMCNC: 32.9 G/DL (ref 32–36)
MCV RBC AUTO: 93 FL (ref 82–98)
MICROSCOPIC COMMENT: ABNORMAL
MONOCYTES # BLD AUTO: 1 K/UL (ref 0.3–1)
MONOCYTES NFR BLD: 8.1 % (ref 4–15)
NEUTROPHILS # BLD AUTO: 8.2 K/UL (ref 1.8–7.7)
NEUTROPHILS NFR BLD: 67.3 % (ref 38–73)
NITRITE UR QL STRIP: NEGATIVE
NRBC BLD-RTO: 0 /100 WBC
PH UR STRIP: 6 [PH] (ref 5–8)
PHOSPHATE SERPL-MCNC: 3.6 MG/DL (ref 2.7–4.5)
PLATELET # BLD AUTO: 207 K/UL (ref 150–450)
PMV BLD AUTO: 11.5 FL (ref 9.2–12.9)
POTASSIUM SERPL-SCNC: 4.1 MMOL/L (ref 3.5–5.1)
PROT SERPL-MCNC: 5.8 G/DL (ref 6–8.4)
PROT UR QL STRIP: NEGATIVE
PROTHROMBIN TIME: 10.4 SEC (ref 9–12.5)
RBC # BLD AUTO: 3.19 M/UL (ref 4–5.4)
RBC #/AREA URNS HPF: >100 /HPF (ref 0–4)
SODIUM SERPL-SCNC: 140 MMOL/L (ref 136–145)
SP GR UR STRIP: >=1.03 (ref 1–1.03)
SQUAMOUS #/AREA URNS HPF: 2 /HPF
URN SPEC COLLECT METH UR: ABNORMAL
UROBILINOGEN UR STRIP-ACNC: NEGATIVE EU/DL
WBC # BLD AUTO: 12.16 K/UL (ref 3.9–12.7)
WBC #/AREA URNS HPF: 7 /HPF (ref 0–5)

## 2022-06-05 PROCEDURE — 25000003 PHARM REV CODE 250: Performed by: NURSE ANESTHETIST, CERTIFIED REGISTERED

## 2022-06-05 PROCEDURE — 63600175 PHARM REV CODE 636 W HCPCS: Performed by: ORTHOPAEDIC SURGERY

## 2022-06-05 PROCEDURE — 80053 COMPREHEN METABOLIC PANEL: CPT | Performed by: NURSE PRACTITIONER

## 2022-06-05 PROCEDURE — 94761 N-INVAS EAR/PLS OXIMETRY MLT: CPT

## 2022-06-05 PROCEDURE — 86920 COMPATIBILITY TEST SPIN: CPT | Performed by: ORTHOPAEDIC SURGERY

## 2022-06-05 PROCEDURE — 25000003 PHARM REV CODE 250: Performed by: ORTHOPAEDIC SURGERY

## 2022-06-05 PROCEDURE — 36000710: Performed by: ORTHOPAEDIC SURGERY

## 2022-06-05 PROCEDURE — D9220A PRA ANESTHESIA: Mod: ANES,,, | Performed by: ANESTHESIOLOGY

## 2022-06-05 PROCEDURE — 63600175 PHARM REV CODE 636 W HCPCS: Performed by: NURSE ANESTHETIST, CERTIFIED REGISTERED

## 2022-06-05 PROCEDURE — 99223 PR INITIAL HOSPITAL CARE,LEVL III: ICD-10-PCS | Mod: ,,, | Performed by: ORTHOPAEDIC SURGERY

## 2022-06-05 PROCEDURE — 81000 URINALYSIS NONAUTO W/SCOPE: CPT | Performed by: NURSE PRACTITIONER

## 2022-06-05 PROCEDURE — 63600175 PHARM REV CODE 636 W HCPCS: Performed by: NURSE PRACTITIONER

## 2022-06-05 PROCEDURE — 86850 RBC ANTIBODY SCREEN: CPT | Performed by: ORTHOPAEDIC SURGERY

## 2022-06-05 PROCEDURE — C1713 ANCHOR/SCREW BN/BN,TIS/BN: HCPCS | Performed by: ORTHOPAEDIC SURGERY

## 2022-06-05 PROCEDURE — 36415 COLL VENOUS BLD VENIPUNCTURE: CPT | Performed by: NURSE PRACTITIONER

## 2022-06-05 PROCEDURE — C1769 GUIDE WIRE: HCPCS | Performed by: ORTHOPAEDIC SURGERY

## 2022-06-05 PROCEDURE — D9220A PRA ANESTHESIA: ICD-10-PCS | Mod: ANES,,, | Performed by: ANESTHESIOLOGY

## 2022-06-05 PROCEDURE — 12000002 HC ACUTE/MED SURGE SEMI-PRIVATE ROOM

## 2022-06-05 PROCEDURE — 94660 CPAP INITIATION&MGMT: CPT

## 2022-06-05 PROCEDURE — 99900035 HC TECH TIME PER 15 MIN (STAT)

## 2022-06-05 PROCEDURE — 84100 ASSAY OF PHOSPHORUS: CPT | Performed by: NURSE PRACTITIONER

## 2022-06-05 PROCEDURE — 27506 TREATMENT OF THIGH FRACTURE: CPT | Mod: RT,,, | Performed by: ORTHOPAEDIC SURGERY

## 2022-06-05 PROCEDURE — 37000008 HC ANESTHESIA 1ST 15 MINUTES: Performed by: ORTHOPAEDIC SURGERY

## 2022-06-05 PROCEDURE — 27000190 HC CPAP FULL FACE MASK W/VALVE

## 2022-06-05 PROCEDURE — D9220A PRA ANESTHESIA: ICD-10-PCS | Mod: CRNA,,, | Performed by: NURSE ANESTHETIST, CERTIFIED REGISTERED

## 2022-06-05 PROCEDURE — 63600175 PHARM REV CODE 636 W HCPCS: Performed by: ANESTHESIOLOGY

## 2022-06-05 PROCEDURE — 63600175 PHARM REV CODE 636 W HCPCS: Performed by: INTERNAL MEDICINE

## 2022-06-05 PROCEDURE — D9220A PRA ANESTHESIA: Mod: CRNA,,, | Performed by: NURSE ANESTHETIST, CERTIFIED REGISTERED

## 2022-06-05 PROCEDURE — 85610 PROTHROMBIN TIME: CPT | Performed by: NURSE PRACTITIONER

## 2022-06-05 PROCEDURE — 25000003 PHARM REV CODE 250

## 2022-06-05 PROCEDURE — 36000711: Performed by: ORTHOPAEDIC SURGERY

## 2022-06-05 PROCEDURE — 27201423 OPTIME MED/SURG SUP & DEVICES STERILE SUPPLY: Performed by: ORTHOPAEDIC SURGERY

## 2022-06-05 PROCEDURE — 85025 COMPLETE CBC W/AUTO DIFF WBC: CPT | Performed by: NURSE PRACTITIONER

## 2022-06-05 PROCEDURE — 85730 THROMBOPLASTIN TIME PARTIAL: CPT | Performed by: NURSE PRACTITIONER

## 2022-06-05 PROCEDURE — 37000009 HC ANESTHESIA EA ADD 15 MINS: Performed by: ORTHOPAEDIC SURGERY

## 2022-06-05 PROCEDURE — 83735 ASSAY OF MAGNESIUM: CPT | Performed by: NURSE PRACTITIONER

## 2022-06-05 PROCEDURE — 71000033 HC RECOVERY, INTIAL HOUR: Performed by: ORTHOPAEDIC SURGERY

## 2022-06-05 PROCEDURE — 27000221 HC OXYGEN, UP TO 24 HOURS

## 2022-06-05 PROCEDURE — 99223 1ST HOSP IP/OBS HIGH 75: CPT | Mod: ,,, | Performed by: ORTHOPAEDIC SURGERY

## 2022-06-05 PROCEDURE — 27506 PR OPEN RX FEMUR FX+INTRAMED ROD: ICD-10-PCS | Mod: RT,,, | Performed by: ORTHOPAEDIC SURGERY

## 2022-06-05 DEVICE — SCREW LOCKING 40MM: Type: IMPLANTABLE DEVICE | Site: FEMUR | Status: FUNCTIONAL

## 2022-06-05 DEVICE — SCREW STRDRV REC T25 5X44 TTNM: Type: IMPLANTABLE DEVICE | Site: FEMUR | Status: FUNCTIONAL

## 2022-06-05 DEVICE — SCREW TFN ADVANCE 90MM: Type: IMPLANTABLE DEVICE | Site: FEMUR | Status: FUNCTIONAL

## 2022-06-05 DEVICE — NAIL IM CANN 130 DEG 11X340 R: Type: IMPLANTABLE DEVICE | Site: FEMUR | Status: FUNCTIONAL

## 2022-06-05 RX ORDER — PROPOFOL 10 MG/ML
VIAL (ML) INTRAVENOUS
Status: DISCONTINUED | OUTPATIENT
Start: 2022-06-05 | End: 2022-06-05

## 2022-06-05 RX ORDER — FENTANYL CITRATE 50 UG/ML
INJECTION, SOLUTION INTRAMUSCULAR; INTRAVENOUS
Status: DISCONTINUED | OUTPATIENT
Start: 2022-06-05 | End: 2022-06-05

## 2022-06-05 RX ORDER — LOPERAMIDE HYDROCHLORIDE 2 MG/1
2 CAPSULE ORAL CONTINUOUS PRN
Status: DISCONTINUED | OUTPATIENT
Start: 2022-06-05 | End: 2022-06-09 | Stop reason: HOSPADM

## 2022-06-05 RX ORDER — LIDOCAINE HCL/PF 100 MG/5ML
SYRINGE (ML) INTRAVENOUS
Status: DISCONTINUED | OUTPATIENT
Start: 2022-06-05 | End: 2022-06-05

## 2022-06-05 RX ORDER — METOCLOPRAMIDE HYDROCHLORIDE 5 MG/ML
10 INJECTION INTRAMUSCULAR; INTRAVENOUS EVERY 10 MIN PRN
Status: DISCONTINUED | OUTPATIENT
Start: 2022-06-05 | End: 2022-06-05

## 2022-06-05 RX ORDER — MORPHINE SULFATE 2 MG/ML
2 INJECTION, SOLUTION INTRAMUSCULAR; INTRAVENOUS EVERY 4 HOURS PRN
Status: DISCONTINUED | OUTPATIENT
Start: 2022-06-05 | End: 2022-06-09 | Stop reason: HOSPADM

## 2022-06-05 RX ORDER — SODIUM CHLORIDE 0.9 % (FLUSH) 0.9 %
10 SYRINGE (ML) INJECTION
Status: DISCONTINUED | OUTPATIENT
Start: 2022-06-05 | End: 2022-06-09 | Stop reason: HOSPADM

## 2022-06-05 RX ORDER — OXYCODONE AND ACETAMINOPHEN 10; 325 MG/1; MG/1
1 TABLET ORAL EVERY 4 HOURS PRN
Status: DISCONTINUED | OUTPATIENT
Start: 2022-06-05 | End: 2022-06-09 | Stop reason: HOSPADM

## 2022-06-05 RX ORDER — CEFAZOLIN SODIUM 1 G/3ML
INJECTION, POWDER, FOR SOLUTION INTRAMUSCULAR; INTRAVENOUS
Status: DISCONTINUED | OUTPATIENT
Start: 2022-06-05 | End: 2022-06-05

## 2022-06-05 RX ORDER — CEFAZOLIN SODIUM 2 G/50ML
2 SOLUTION INTRAVENOUS
Status: COMPLETED | OUTPATIENT
Start: 2022-06-05 | End: 2022-06-05

## 2022-06-05 RX ORDER — HYDROMORPHONE HYDROCHLORIDE 2 MG/ML
0.2 INJECTION, SOLUTION INTRAMUSCULAR; INTRAVENOUS; SUBCUTANEOUS EVERY 5 MIN PRN
Status: DISCONTINUED | OUTPATIENT
Start: 2022-06-05 | End: 2022-06-05

## 2022-06-05 RX ORDER — ONDANSETRON HYDROCHLORIDE 2 MG/ML
INJECTION, SOLUTION INTRAMUSCULAR; INTRAVENOUS
Status: DISCONTINUED | OUTPATIENT
Start: 2022-06-05 | End: 2022-06-05

## 2022-06-05 RX ORDER — PHENYLEPHRINE HYDROCHLORIDE 10 MG/ML
INJECTION INTRAVENOUS
Status: DISCONTINUED | OUTPATIENT
Start: 2022-06-05 | End: 2022-06-05

## 2022-06-05 RX ORDER — HYDROMORPHONE HYDROCHLORIDE 1 MG/ML
0.5 INJECTION, SOLUTION INTRAMUSCULAR; INTRAVENOUS; SUBCUTANEOUS ONCE
Status: COMPLETED | OUTPATIENT
Start: 2022-06-05 | End: 2022-06-05

## 2022-06-05 RX ORDER — HYDROCODONE BITARTRATE AND ACETAMINOPHEN 5; 325 MG/1; MG/1
1 TABLET ORAL EVERY 4 HOURS PRN
Status: DISCONTINUED | OUTPATIENT
Start: 2022-06-05 | End: 2022-06-05

## 2022-06-05 RX ORDER — EPHEDRINE SULFATE 50 MG/ML
INJECTION, SOLUTION INTRAVENOUS
Status: DISCONTINUED | OUTPATIENT
Start: 2022-06-05 | End: 2022-06-05

## 2022-06-05 RX ORDER — ACETAMINOPHEN 10 MG/ML
INJECTION, SOLUTION INTRAVENOUS
Status: DISCONTINUED | OUTPATIENT
Start: 2022-06-05 | End: 2022-06-05

## 2022-06-05 RX ADMIN — ACETAMINOPHEN 1000 MG: 10 INJECTION, SOLUTION INTRAVENOUS at 08:06

## 2022-06-05 RX ADMIN — SODIUM CHLORIDE, SODIUM LACTATE, POTASSIUM CHLORIDE, AND CALCIUM CHLORIDE: .6; .31; .03; .02 INJECTION, SOLUTION INTRAVENOUS at 08:06

## 2022-06-05 RX ADMIN — OXYCODONE AND ACETAMINOPHEN 1 TABLET: 10; 325 TABLET ORAL at 11:06

## 2022-06-05 RX ADMIN — HYDROMORPHONE HYDROCHLORIDE 0.5 MG: 1 INJECTION, SOLUTION INTRAMUSCULAR; INTRAVENOUS; SUBCUTANEOUS at 04:06

## 2022-06-05 RX ADMIN — MORPHINE SULFATE 4 MG: 4 INJECTION INTRAVENOUS at 02:06

## 2022-06-05 RX ADMIN — HYDROMORPHONE HYDROCHLORIDE 0.2 MG: 2 INJECTION, SOLUTION INTRAMUSCULAR; INTRAVENOUS; SUBCUTANEOUS at 10:06

## 2022-06-05 RX ADMIN — CEFAZOLIN SODIUM 2 G: 2 SOLUTION INTRAVENOUS at 08:06

## 2022-06-05 RX ADMIN — ONDANSETRON 4 MG: 2 INJECTION, SOLUTION INTRAMUSCULAR; INTRAVENOUS at 08:06

## 2022-06-05 RX ADMIN — PHENYLEPHRINE HYDROCHLORIDE 200 MCG: 10 INJECTION INTRAVENOUS at 08:06

## 2022-06-05 RX ADMIN — EPHEDRINE SULFATE 10 MG: 50 INJECTION, SOLUTION INTRAMUSCULAR; INTRAVENOUS; SUBCUTANEOUS at 09:06

## 2022-06-05 RX ADMIN — CYCLOBENZAPRINE HYDROCHLORIDE 5 MG: 5 TABLET, FILM COATED ORAL at 06:06

## 2022-06-05 RX ADMIN — MORPHINE SULFATE 4 MG: 4 INJECTION INTRAVENOUS at 07:06

## 2022-06-05 RX ADMIN — HYDROCODONE BITARTRATE AND ACETAMINOPHEN 1 TABLET: 5; 325 TABLET ORAL at 12:06

## 2022-06-05 RX ADMIN — Medication 1 TABLET: at 06:06

## 2022-06-05 RX ADMIN — METHOCARBAMOL TABLETS 750 MG: 750 TABLET, COATED ORAL at 02:06

## 2022-06-05 RX ADMIN — METHOCARBAMOL TABLETS 750 MG: 750 TABLET, COATED ORAL at 08:06

## 2022-06-05 RX ADMIN — CEFAZOLIN 2 G: 1 INJECTION, POWDER, FOR SOLUTION INTRAVENOUS at 08:06

## 2022-06-05 RX ADMIN — DOCUSATE SODIUM AND SENNOSIDES 1 TABLET: 8.6; 5 TABLET, FILM COATED ORAL at 08:06

## 2022-06-05 RX ADMIN — SODIUM CHLORIDE, SODIUM GLUCONATE, SODIUM ACETATE, POTASSIUM CHLORIDE, MAGNESIUM CHLORIDE, SODIUM PHOSPHATE, DIBASIC, AND POTASSIUM PHOSPHATE: .53; .5; .37; .037; .03; .012; .00082 INJECTION, SOLUTION INTRAVENOUS at 09:06

## 2022-06-05 RX ADMIN — LIDOCAINE HYDROCHLORIDE 50 MG: 20 INJECTION INTRAVENOUS at 08:06

## 2022-06-05 RX ADMIN — OXYCODONE AND ACETAMINOPHEN 1 TABLET: 10; 325 TABLET ORAL at 06:06

## 2022-06-05 RX ADMIN — METOPROLOL SUCCINATE 25 MG: 25 TABLET, EXTENDED RELEASE ORAL at 08:06

## 2022-06-05 RX ADMIN — SODIUM CHLORIDE, SODIUM GLUCONATE, SODIUM ACETATE, POTASSIUM CHLORIDE, MAGNESIUM CHLORIDE, SODIUM PHOSPHATE, DIBASIC, AND POTASSIUM PHOSPHATE: .53; .5; .37; .037; .03; .012; .00082 INJECTION, SOLUTION INTRAVENOUS at 08:06

## 2022-06-05 RX ADMIN — EPHEDRINE SULFATE 20 MG: 50 INJECTION, SOLUTION INTRAMUSCULAR; INTRAVENOUS; SUBCUTANEOUS at 08:06

## 2022-06-05 RX ADMIN — FENTANYL CITRATE 50 MCG: 50 INJECTION, SOLUTION INTRAMUSCULAR; INTRAVENOUS at 08:06

## 2022-06-05 RX ADMIN — EZETIMIBE 10 MG: 10 TABLET ORAL at 08:06

## 2022-06-05 RX ADMIN — CEFAZOLIN SODIUM 2 G: 2 SOLUTION INTRAVENOUS at 01:06

## 2022-06-05 RX ADMIN — PROPOFOL 120 MG: 10 INJECTION, EMULSION INTRAVENOUS at 08:06

## 2022-06-05 NOTE — PLAN OF CARE
POC reviewed, pt verbalized understanding. Pt to surgery today. Medicated per prn pain medication. Cox intact. Dsg CDI. Regular diet. Tele 8712 NSR. Room air. VSS. AAO. IVF infusing. Q2h rounding done. All needs attended to. Safety maintained, skin intact. Bed locked and low, bed alarm on ,call light in reach.

## 2022-06-05 NOTE — NURSING
Pt to room 407 safely via stretcher. Oriented to room. Prn morphine for pain. Denies nausea. Educated about NPO at midnight status. All needs attended to. Safety maintained. Bed locked and low, call light in reach.

## 2022-06-05 NOTE — ANESTHESIA PREPROCEDURE EVALUATION
06/05/2022  Aimee Palumbo is a 71 y.o., female.      Pre-op Assessment    I have reviewed the Patient Summary Reports.     I have reviewed the Nursing Notes. I have reviewed the NPO Status.   I have reviewed the Medications.     Review of Systems  Anesthesia Hx:  Denies Family Hx of Anesthesia complications.   Denies Personal Hx of Anesthesia complications.   Cardiovascular:   Hypertension CAD asymptomatic CABG/stent  ECG has been reviewed. Stent x 2 2006  Preserved LVFx Echo 2021   Pulmonary:   Sleep Apnea, CPAP    Hepatic/GI:   GERD, well controlled    Endocrine:  Morbid Obesity / BMI > 40  Psych:   Psychiatric History          Physical Exam  General: Cooperative, Alert, Oriented and Anxious    Airway:  Mallampati: III / II  Mouth Opening: Normal  TM Distance: 4 - 6 cm  Tongue: Normal  Neck ROM: Extension Decreased  Neck: Girth Increased    Dental:  Intact    Chest/Lungs:  Normal Respiratory Rate    Heart:  Rate: Normal  Rhythm: Regular Rhythm        Anesthesia Plan  Type of Anesthesia, risks & benefits discussed:    Anesthesia Type: Gen ETT, Gen Supraglottic Airway  Intra-op Monitoring Plan: Standard ASA Monitors  Post Op Pain Control Plan: multimodal analgesia  Induction:  IV  Airway Plan: , Post-Induction  Informed Consent: Informed consent signed with the Patient and all parties understand the risks and agree with anesthesia plan.  All questions answered.   ASA Score: 3    Ready For Surgery From Anesthesia Perspective.     .

## 2022-06-05 NOTE — HPI
Aimee Palumbo is a 71-year-old female with past medical history significant for coronary artery disease, GERD, hyperlipidemia, hypertension, obstructive sleep apnea on CPAP, and osteoporosis who presented to the emergency department after a mechanical fall at home for further evaluation of right lower extremity pain.  Pt states that she was at the home of a friend when she tripped and fell on the sidewalk.  ED evaluation was significant for a closed displaced right midshaft femur fracture.  Patient has no further complaints at this time.

## 2022-06-05 NOTE — TRANSFER OF CARE
"Anesthesia Transfer of Care Note    Patient: Aimee Palumbo    Procedure(s) Performed: Procedure(s) (LRB):  INSERTION, INTRAMEDULLARY JOHAN, FEMUR (Right)    Patient location: PACU    Anesthesia Type: general    Transport from OR: Transported from OR on 2-3 L/min O2 by NC with adequate spontaneous ventilation    Post pain: adequate analgesia    Post assessment: no apparent anesthetic complications    Post vital signs: stable    Level of consciousness: awake and alert    Nausea/Vomiting: no nausea/vomiting    Complications: none    Transfer of care protocol was followed      Last vitals:   Visit Vitals  BP (!) 113/52   Pulse 88   Temp 36.5 °C (97.7 °F)   Resp (!) 9   Ht 4' 11" (1.499 m)   Wt 81.6 kg (180 lb)   SpO2 99%   BMI 36.36 kg/m²     "

## 2022-06-05 NOTE — ASSESSMENT & PLAN NOTE
orthopedic surgery for post op management  surg done 6/5/2022 am  Pain control per surg post op  Barber's traction per orthopedic recommendation  PT/OT consult following surgery  CM for dc planning

## 2022-06-05 NOTE — OP NOTE
[facility]  Orthopedic Surgery  Operative Note    SUMMARY     Date of Procedure: 6/5/2022     Procedure: Procedure(s) (LRB):  INSERTION, INTRAMEDULLARY JOHAN, FEMUR (Right)       Surgeon(s) and Role:     * Choco Cristobal MD - Primary    1st Assist: Asa SMA Willie    Pre-Operative Diagnosis:   Closed fracture of shaft of right femur, unspecified fracture morphology, initial encounter [S72.301A]    Post-Operative Diagnosis: Post-Op Diagnosis Codes:     * Closed fracture of shaft of right femur, unspecified fracture morphology, initial encounter [S72.301A]    Anesthesia: General    Description of the Findings of the Procedure:  Patient had a minimally displaced midshaft femur fracture    Complications: No    Estimated Blood Loss (EBL): 56.78 mL           Implants:   Implant Name Type Inv. Item Serial No.  Lot No. LRB No. Used Action   NAIL IM ROSARIO 130 DEG [id number] R - [id number]  NAIL IM ROSARIO 130 DEG [id number] R  [employer] [id number] Right 1 Implanted   SCREW TFN ADVANCE 90MM - [id number]  SCREW TFN ADVANCE 90MM  SYN[employer]ES [id number] Right 1 Implanted   SCREW STRDRV REC T25 5X46 TTNM - [id number]  SCREW STRDRV REC T25 5X46 TTNM  [employer]ES  Right 1 Implanted and Explanted   SCREW STRDRV REC T25 5X44 TTNM - [id number]  SCREW STRDRV REC T25 5X44 TTNM  [employer]ES  Right 1 Implanted   SCREW LOCKING 40MM - [id number]  SCREW LOCKING 40MM  [employer]  Right 1 Implanted   JOHAN REAM W/BL TP 2.5MM D 950 - [id number]  JOHAN REAM W/BL TP 2.5MM D 950  [employer]  Right 1 Implanted and Explanted   WIRE GUIDE 3.2MM 400MM - [id number]  WIRE GUIDE 3.2MM 400MM  [employer]  Right 2 Implanted and Explanted   REAMER HEAD 8.5MM    [employer]ES  Right 1 Implanted and Explanted   REAMER HEAD 11MM    SYNTHES  Right 1 Implanted and Explanted   REAMER HEAD 12MM    SYNTHES  Right 1 Implanted and Explanted       Specimens:   Specimen (24h ago, onward)            None                  Condition: Good    Disposition: PACU - hemodynamically  stable.    Attestation: I was present and scrubbed for the entire procedure.    INDICATIONS FOR THE PROCEDURE: A 71year-old female patient sustained a fall. The patient sustained a right femur fracture. The patient was medically cleared and after discussion with the family proceeded with the procedure above.    PROCEDURE IN DETAIL: Risks, benefits and alternatives of the procedure were   explained to the patient and family including, but not limited to damage to   nerves, arteries, blood vessels. Also explained the risk of nonunion, malunion,  hardware prominence, hardware failure, cut out as well as infection, DVT, PE as  well as anesthetic complications including seizure, stroke, heart attack and   death. They understood this and signed informed consent. The patient's right   hip was marked prior to coming to the Operating Room. Once a formal timeout was  done in which correct patient, procedure and op site were all correctly   identified and confirmed by the entire operating team. Then, 2gm Ancef were given prior to surgical incision. General anesthesia was induced. The patient's  right lower extremity was prepped and draped in normal   sterile fashion. Fluoro was brought in to make sure that we could adequately   reduce the fracture closed which we could with traction on the fracture table.   A 3 cm incision was made proximal to the tip of the greater trochanter onto skin  in the fascia david. A guidewire was inserted right into the tip of the greater  trochanter and in the center of the femoral neck. This was confirmed on lateral fluoro and then   the entry reamer was used to ream our starting point.  a guidewire was placed   with the ball-tipped going distally.  With just some traction the fracture reduced and the ball-tipped guidewire was able to be inserted down into the distal fragment down into the knee.  We then measured for our length and selected a 340 millimeter nail.  We then started reaming and reamed  from an 8 up to a 12 for an 11 millimeter diameter nail.  After  this was done, the 11 x 340 TFN Synthes nail was then inserted all the way down until the lag screw would go into the center of the head.THe lag guide was placed and skin was incised and then the guidewire was inserted into the center of the   femoral head on the AP and lateral view. We then   drilled for a lag screw and then placed a lag screw in acceptable position.   After this was done, we then placed the locking set screw up top, and then   removed the guide.  We then placed 2 distal locking screws using perfect [place] technique to lock it distally.    After this was done, all guides were taken out. Fluoro shots were taken at   the hip in both AP and lateral planes confirming appropriate lag screw position   and 1 distally to confirm bicortical locking screw fixation as well as of the fracture site. After this was   done, we proceeded with closing.  Hemostasis was obtained.  Wounds were irrigated with normal   saline. Deep tissue was closed using 0 Vicryl, subcutaneous tissue was closed   using 2-0 Vicryl and then skin staples. Sterile dressing was applied. They were  extubated, awakened, and was transferred from the Operating Room to the Recovery  Room in stable condition.

## 2022-06-05 NOTE — ASSESSMENT & PLAN NOTE
To the OR for IM nailing.    Risks, benefits, and alternatives to the procedure were explained to the patient including but not limited to damage to nerves, arteries, blood vessels, infection, DVT, PE as well as general anesthetic complications including seizure, stroke, heart attack and even death. The patient understood these risks and wished to proceed and signed the informed consent.

## 2022-06-05 NOTE — HOSPITAL COURSE
Aimee Palumbo is a 71 year old  female who was admitted after a fall at home and found to have closed displaced right midshaft femur fracture. She was admitted to Hospital Medicine and followed closely. Ortho inserted an intermedullary dorian into the right femur 6/5/2022. Pain was managed per ortho. PT/OT were consulted for eval/Tx. Patient complained of left arm pain from her fall. Xray of left forearm and wrist obtained showed no fracture only soft tissue swelling. Patient experienced dyspnea with elevated heart rate night of 6/7/2022. O2 sats dropped into low 60s on room air. CTA performed demonstrated right upper lobe PE. Patient's supplemental oxygen demands increased to 4L. Patient started on IV heparin and APTT ordered. Repeat APTT found to be in therapeutic range. Pulmonology consulted for further evaluation and treatment. Patient given one unit of blood following surgery for low H&H. US of BLE obtained negative for DVT. Patient remained on IV heparin until day of discharge. Patient switched to oral anticoagulation. PT/OT continued to work with patient and progressively advanced therapy. Patient accepted to [facility] for continued physical therapy. On day of discharge, patient dyspnea continues to improve with lower supplemental oxygen demands. Patient's leg pain well controlled on prn pain medication. Plan to discharge patient to [facility] with oral anticoagulation and prn pain medication. Patient verbalized understanding of discharge instructions and return precautions.     Physical Exam:  General- Patient alert and oriented x3 in NAD  HEENT- PERRLA, EOMI, OP clear, MMM  CV- Regular rate and rhythm, No Murmur/apollo/rubs  Resp- Lungs CTA Bilaterally, No increased WOB, currently on supplemental O2  GI- Non tender/non-distended, BS normoactive x4 quads, no HSM  Extrem- No cyanosis, clubbing, edema. Pulses 2+ and symmetric. Bruising noted to left wrist improving, right hip surgical  incisions dressings CDI  Neuro- Strength 5/5 flexors/extensors, DTRs 2+ and symmetric, Intact sensation to light touch grossly

## 2022-06-05 NOTE — PROGRESS NOTES
[facility] Hospital Medicine  Progress Note    Patient Name: Aimee Palumbo  MRN: [id number]  Patient Class: IP- Inpatient   Admission Date: 6/4/2022  Length of Stay: 1 days  Attending Physician: Tanner Chappell MD  Primary Care Provider: Amor Lamas Jr, MD        Subjective:     Principal Problem:Closed fracture of shaft of right femur        HPI:  Aimee Palumbo is a 71-year-old female with past medical history significant for coronary artery disease, GERD, hyperlipidemia, hypertension, obstructive sleep apnea on CPAP, and osteoporosis who presented to the emergency department today after a mechanical fall at home for further evaluation right lower extremity pain.  Pt states that she was at the home of a friend when she tripped and fell on the sidewalk.  ED evaluation was significant for a closed displaced right midshaft femur fracture.  Per report, the orthopedic surgeon on-call was contacted who states he will take her to surgery tomorrow morning.  Patient has no further complaints at this time.      Overview/Hospital Course:  Aimee Palumbo is a 71 year old  female who was admitted after a fall at home and found to have closed displaced right midshaft femur fracture. She was admitted to Hospital Medicine and followed closely. Ortho inserted an intermedullary dorian into the right femur 6/5/2022. Pain was managed per ortho. PT/OT were consulted for eval/Tx.       Interval History: Pt seen and examined . Labs and diagnostics reviewed. See interval hx.    Review of Systems   Constitutional:  Positive for activity change and appetite change. Negative for chills, diaphoresis, fatigue and fever.   HENT:  Negative for congestion and postnasal drip.    Respiratory:  Negative for cough and shortness of breath.    Cardiovascular:  Negative for chest pain and palpitations.   Gastrointestinal:  Negative for abdominal pain, constipation, diarrhea, nausea and vomiting.   Genitourinary:   Negative for dysuria.        Cox   Musculoskeletal:  Positive for arthralgias, gait problem and myalgias. Negative for back pain and joint swelling.        Right hip and leg   Neurological:  Negative for dizziness and weakness.   Psychiatric/Behavioral:  Negative for agitation, behavioral problems, confusion and decreased concentration.    Objective:     Vital Signs (Most Recent):  Temp: 97.7 °F (36.5 °C) (06/05/22 1045)  Pulse: 95 (06/05/22 1053)  Resp: 16 (06/05/22 1053)  BP: (!) 124/55 (06/05/22 1053)  SpO2: (!) 94 % (06/05/22 1053)   Vital Signs (24h Range):  Temp:  [97.7 °F (36.5 °C)-98.5 °F (36.9 °C)] 97.7 °F (36.5 °C)  Pulse:  [] 95  Resp:  [9-18] 16  SpO2:  [94 %-100 %] 94 %  BP: (110-158)/(52-81) 124/55     Weight: 81.6 kg (180 lb)  Body mass index is 36.36 kg/m².    Intake/Output Summary (Last 24 hours) at 6/5/2022 1229  Last data filed at 6/5/2022 0934  Gross per 24 hour   Intake 1550 ml   Output 518 ml   Net 1032 ml      Physical Exam  Vitals and nursing note reviewed.   Constitutional:       Appearance: She is obese.      Comments: Drowsy post op. Awakens easily, returns to sleep.    HENT:      Head: Normocephalic and atraumatic.      Nose: Nose normal.      Mouth/Throat:      Pharynx: Oropharynx is clear.   Eyes:      Conjunctiva/sclera: Conjunctivae normal.   Cardiovascular:      Rate and Rhythm: Normal rate and regular rhythm.   Pulmonary:      Effort: Pulmonary effort is normal.      Breath sounds: Normal breath sounds.   Abdominal:      General: Bowel sounds are normal.      Tenderness: There is no abdominal tenderness. There is no guarding or rebound.   Musculoskeletal:      Cervical back: Normal range of motion.      Right lower leg: No edema.      Left lower leg: No edema.      Comments: Left leg normal ROM. Right leg- straight. Hip DDI x 2. Able to flex extend ankle and toes. Pedal pulse strong.    Skin:     General: Skin is warm.      Capillary Refill: Capillary refill takes less than  2 seconds.   Neurological:      Mental Status: She is oriented to person, place, and time.   Psychiatric:         Mood and Affect: Mood normal.         Behavior: Behavior normal.       Significant Labs: All pertinent labs within the past 24 hours have been reviewed.  CBC:   Recent Labs   Lab 06/04/22  1213 06/05/22  0441   WBC 14.89* 12.16   HGB 11.7* 9.7*   HCT 34.8* 29.5*    207     CMP:   Recent Labs   Lab 06/04/22  1213 06/05/22  0441    140   K 3.9 4.1    107   CO2 21* 23   * 114*   BUN 21 19   CREATININE 0.8 0.8   CALCIUM 9.3 8.8   PROT 6.5 5.8*   ALBUMIN 3.5 3.2*   BILITOT 0.7 0.7   ALKPHOS 64 52*   AST 31 27   ALT 32 28   ANIONGAP 11 10   EGFRNONAA >60 >60     Magnesium:   Recent Labs   Lab 06/05/22  0441   MG 2.0       Significant Imaging: I have reviewed all pertinent imaging results/findings within the past 24 hours.      Assessment/Plan:      * Closed fracture of shaft of right femur   orthopedic surgery for post op management  surg done 6/5/2022 am  Pain control per surg post op  Barber's traction per orthopedic recommendation  PT/OT consult following surgery  CM for dc planning      ARLETH on CPAP  Chronic; continue chronic CPAP      Obesity  Body mass index is 36.36 kg/m². Morbid obesity complicates all aspects of disease management from diagnostic modalities to treatment. Weight loss encouraged and health benefits explained to patient.          Major depression        Coronary artery disease  Patient with known CAD s/p stent placement, which is controlled Will continue Statin and monitor for S/Sx of angina/ACS. Continue to monitor on telemetry.   Hold chronic ASA until after surgery.          Gastroesophageal reflux disease with esophagitis  Chronic; stable; continue chronic PPI      Hyperlipidemia   Patient is chronically on statin.will continue for now. Monitor clinically. Last LDL was No results found for: LDLCALC         Hypertension  Chronic, controlled.  Latest blood  pressure and vitals reviewed-   Temp:  [97.7 °F (36.5 °C)-98.5 °F (36.9 °C)]   Pulse:  []   Resp:  [9-18]   BP: (110-158)/(52-81)   SpO2:  [94 %-100 %] .   Home meds for hypertension were reviewed and noted below.   Hypertension Medications             metoprolol succinate (TOPROL-XL) 25 MG 24 hr tablet Take 1 tablet (25 mg total) by mouth 2 (two) times a day.    nitroGLYCERIN (NITROSTAT) 0.4 MG SL tablet Place 0.4 mg under the tongue every 5 (five) minutes as needed for Chest pain.          While in the hospital, will manage blood pressure as follows; Continue home antihypertensive regimen    Will utilize p.r.n. blood pressure medication only if patient's blood pressure greater than  180/110 and she develops symptoms such as worsening chest pain or shortness of breath.        Osteoporosis  Chronic; continue daily calcium/vit d supplement        VTE Risk Mitigation (From admission, onward)         Ordered     IP VTE HIGH RISK PATIENT  Once         06/04/22 1645     Place sequential compression device  Until discontinued         06/04/22 1645     Reason for No Pharmacological VTE Prophylaxis  Once        Question:  Reasons:  Answer:  Risk of Bleeding    06/04/22 1645                Discharge Planning   MOR:      Code Status: Full Code   Is the patient medically ready for discharge?:     Reason for patient still in hospital (select all that apply): Patient trending condition, Laboratory test, Treatment, Consult recommendations and PT / OT recommendations                     Gail Taveras NP  Department of Hospital Medicine   [facility]

## 2022-06-05 NOTE — CONSULTS
[facility]  Orthopedics  Consult Note    Patient Name: Aimee Palumbo  MRN: [id number]  Admission Date: 6/4/2022  Hospital Length of Stay: 1 days  Attending Provider: Tanner Chappell MD  Primary Care Provider: Amor Lamas Jr, MD    Patient information was obtained from patient and ER records.     Inpatient consult to Orthopedics  Consult performed by: Choco Cristobal MD  Consult ordered by: WADE Batista  Reason for consult: R femur fx        Subjective:     Principal Problem:Closed fracture of shaft of right femur    Chief Complaint:   Chief Complaint   Patient presents with    Fall     Trip and fall earlier today, right leg pain from hip to knee rated 10/10; + shortening and rotation         HPI: Aimee Palumbo is a 71-year-old female with past medical history significant for coronary artery disease, GERD, hyperlipidemia, hypertension, obstructive sleep apnea on CPAP, and osteoporosis who presented to the emergency department after a mechanical fall at home for further evaluation of right lower extremity pain.  Pt states that she was at the home of a friend when she tripped and fell on the sidewalk.  ED evaluation was significant for a closed displaced right midshaft femur fracture.  Patient has no further complaints at this time.      Past Medical History:   Diagnosis Date    Anxiety     Constipation     Coronary artery disease     Esophageal stricture     GERD (gastroesophageal reflux disease)     indefinite PPI    Hyperlipidemia     Hypertension     Mitral valve prolapse     ARLETH on CPAP     Osteoporosis        Past Surgical History:   Procedure Laterality Date    APPENDECTOMY      BREAST BIOPSY      BREAST CYST ASPIRATION      BREAST SURGERY  12/2010    Reduction    cardiovascular stress testing  09/19/2017    CHOLECYSTECTOMY  12/13/2017    Laparoscopic- Dr Lai    CORONARY STENT PLACEMENT  2007    echocardiography  09/19/2017     ESOPHAGOGASTRODUODENOSCOPY      TONSILLECTOMY      TOTAL REDUCTION MAMMOPLASTY      TUBAL LIGATION         Review of patient's allergies indicates:  No Known Allergies    Current Facility-Administered Medications   Medication    acetaminophen tablet 650 mg    albuterol-ipratropium 2.5 mg-0.5 mg/3 mL nebulizer solution 3 mL    aluminum-magnesium hydroxide-simethicone 200-200-20 mg/5 mL suspension 30 mL    calcium-vitamin D3 500 mg-5 mcg (200 unit) per tablet 1 tablet    cyclobenzaprine tablet 5 mg    dextrose 10% bolus 125 mL    dextrose 10% bolus 250 mL    ezetimibe tablet 10 mg    glucagon (human recombinant) injection 1 mg    glucose chewable tablet 16 g    glucose chewable tablet 24 g    lactated ringers infusion    magnesium oxide tablet 800 mg    magnesium oxide tablet 800 mg    melatonin tablet 6 mg    methocarbamoL tablet 750 mg    metoprolol succinate (TOPROL-XL) 24 hr tablet 25 mg    morphine injection 2 mg    morphine injection 4 mg    [START ON 6/6/2022] multivitamin tablet    naloxone 0.4 mg/mL injection 0.02 mg    ondansetron injection 4 mg    pantoprazole EC tablet 40 mg    potassium bicarbonate disintegrating tablet 35 mEq    potassium bicarbonate disintegrating tablet 50 mEq    potassium bicarbonate disintegrating tablet 60 mEq    potassium, sodium phosphates 280-160-250 mg packet 2 packet    potassium, sodium phosphates 280-160-250 mg packet 2 packet    potassium, sodium phosphates 280-160-250 mg packet 2 packet    pravastatin tablet 80 mg    senna-docusate 8.6-50 mg per tablet 1 tablet    simethicone chewable tablet 80 mg    sodium chloride 0.9% flush 10 mL     Family History       Problem Relation (Age of Onset)    Breast cancer Mother    Cancer Brother    Diabetes Brother, Maternal Uncle    Hyperlipidemia Mother, Father, Brother    Hypertension Mother    Lung cancer Father, Paternal Uncle    Stroke Maternal Grandmother          Tobacco Use    Smoking status:  "Never Smoker    Smokeless tobacco: Never Used   Substance and Sexual Activity    Alcohol use: Yes     Comment: occ    Drug use: No    Sexual activity: Never     Review of Systems   Constitutional: Negative for chills and fever.   HENT:  Negative for congestion.    Eyes:  Negative for blurred vision.   Cardiovascular:  Negative for chest pain and syncope.   Respiratory:  Negative for cough and shortness of breath.    Endocrine: Negative for polyuria.   Hematologic/Lymphatic: Negative for bleeding problem. Does not bruise/bleed easily.   Skin:  Negative for rash.   Musculoskeletal:  Positive for falls, joint pain and joint swelling. Negative for muscle cramps, muscle weakness and myalgias.   Gastrointestinal:  Negative for abdominal pain, nausea and vomiting.   Genitourinary:  Negative for flank pain.   Neurological:  Negative for numbness and seizures.   Psychiatric/Behavioral:  Negative for altered mental status.    Allergic/Immunologic: Negative for persistent infections.   Objective:     Vital Signs (Most Recent):  Temp: 98.3 °F (36.8 °C) (06/05/22 0439)  Pulse: 86 (06/05/22 0439)  Resp: 16 (06/05/22 0730)  BP: 131/61 (06/05/22 0439)  SpO2: 95 % (06/05/22 0439) Vital Signs (24h Range):  Temp:  [98.2 °F (36.8 °C)-98.5 °F (36.9 °C)] 98.3 °F (36.8 °C)  Pulse:  [] 86  Resp:  [16-18] 16  SpO2:  [95 %-100 %] 95 %  BP: (122-158)/(55-81) 131/61     Weight: 81.6 kg (180 lb)  Height: 4' 11" (149.9 cm)  Body mass index is 36.36 kg/m².      Intake/Output Summary (Last 24 hours) at 6/5/2022 0748  Last data filed at 6/5/2022 0600  Gross per 24 hour   Intake 450 ml   Output 350 ml   Net 100 ml       General    Nursing note and vitals reviewed.  Constitutional: She is oriented to person, place, and time. She appears well-developed and well-nourished. No distress.   HENT:   Head: Atraumatic.   Eyes: EOM are normal.   Cardiovascular:  Normal rate.            Pulmonary/Chest: Effort normal.   Abdominal: Soft. "   Neurological: She is alert and oriented to person, place, and time.   Psychiatric: Her behavior is normal.             Right Hip Exam     Inspection   Swelling: present  Deformity of hip.  Erythema: absent    Range of Motion   Abduction:  abnormal   Adduction:  abnormal   Extension:  abnormal   Flexion:  abnormal   External rotation:  abnormal   Internal rotation:  abnormal     Tests   Log Roll: positive    Other   Sensation: normal  Left Hip Exam   Left hip exam is normal.          Vascular Exam     Right Pulses  Dorsalis Pedis:      2+          Significant Labs: BMP:   Recent Labs   Lab 06/05/22  0441   *      K 4.1      CO2 23   BUN 19   CREATININE 0.8   CALCIUM 8.8   MG 2.0     CBC:   Recent Labs   Lab 06/04/22 1213 06/05/22  0441   WBC 14.89* 12.16   HGB 11.7* 9.7*   HCT 34.8* 29.5*    207     CMP:   Recent Labs   Lab 06/04/22 1213 06/05/22  0441    140   K 3.9 4.1    107   CO2 21* 23   * 114*   BUN 21 19   CREATININE 0.8 0.8   CALCIUM 9.3 8.8   PROT 6.5 5.8*   ALBUMIN 3.5 3.2*   BILITOT 0.7 0.7   ALKPHOS 64 52*   AST 31 27   ALT 32 28   ANIONGAP 11 10   EGFRNONAA >60 >60     Coagulation:   Recent Labs   Lab 06/05/22 0441   LABPROT 10.4   INR 1.0   APTT 25.5     All pertinent labs within the past 24 hours have been reviewed.    Significant Imaging: X-Ray: I have reviewed all pertinent results/findings and my personal findings are:  X-rays of the right femur are reviewed and interpreted which show a midshaft femur fracture with mild displacement    Assessment/Plan:     * Closed fracture of shaft of right femur  To the OR for IM nailing.    Risks, benefits, and alternatives to the procedure were explained to the patient including but not limited to damage to nerves, arteries, blood vessels, infection, DVT, PE as well as general anesthetic complications including seizure, stroke, heart attack and even death. The patient understood these risks and wished to proceed  and signed the informed consent.             Thank you for your consult. I will follow-up with patient. Please contact us if you have any additional questions.    Choco Cristobal MD  Orthopedics  [facility]

## 2022-06-05 NOTE — SUBJECTIVE & OBJECTIVE
Interval History: Pt seen and examined . Labs and diagnostics reviewed. See interval hx.    Review of Systems   Constitutional:  Positive for activity change and appetite change. Negative for chills, diaphoresis, fatigue and fever.   HENT:  Negative for congestion and postnasal drip.    Respiratory:  Negative for cough and shortness of breath.    Cardiovascular:  Negative for chest pain and palpitations.   Gastrointestinal:  Negative for abdominal pain, constipation, diarrhea, nausea and vomiting.   Genitourinary:  Negative for dysuria.        Cox   Musculoskeletal:  Positive for arthralgias, gait problem and myalgias. Negative for back pain and joint swelling.        Right hip and leg   Neurological:  Negative for dizziness and weakness.   Psychiatric/Behavioral:  Negative for agitation, behavioral problems, confusion and decreased concentration.    Objective:     Vital Signs (Most Recent):  Temp: 97.7 °F (36.5 °C) (06/05/22 1045)  Pulse: 95 (06/05/22 1053)  Resp: 16 (06/05/22 1053)  BP: (!) 124/55 (06/05/22 1053)  SpO2: (!) 94 % (06/05/22 1053)   Vital Signs (24h Range):  Temp:  [97.7 °F (36.5 °C)-98.5 °F (36.9 °C)] 97.7 °F (36.5 °C)  Pulse:  [] 95  Resp:  [9-18] 16  SpO2:  [94 %-100 %] 94 %  BP: (110-158)/(52-81) 124/55     Weight: 81.6 kg (180 lb)  Body mass index is 36.36 kg/m².    Intake/Output Summary (Last 24 hours) at 6/5/2022 1229  Last data filed at 6/5/2022 0934  Gross per 24 hour   Intake 1550 ml   Output 518 ml   Net 1032 ml      Physical Exam  Vitals and nursing note reviewed.   Constitutional:       Appearance: She is obese.      Comments: Drowsy post op. Awakens easily, returns to sleep.    HENT:      Head: Normocephalic and atraumatic.      Nose: Nose normal.      Mouth/Throat:      Pharynx: Oropharynx is clear.   Eyes:      Conjunctiva/sclera: Conjunctivae normal.   Cardiovascular:      Rate and Rhythm: Normal rate and regular rhythm.   Pulmonary:      Effort: Pulmonary effort is normal.       Breath sounds: Normal breath sounds.   Abdominal:      General: Bowel sounds are normal.      Tenderness: There is no abdominal tenderness. There is no guarding or rebound.   Musculoskeletal:      Cervical back: Normal range of motion.      Right lower leg: No edema.      Left lower leg: No edema.      Comments: Left leg normal ROM. Right leg- straight. Hip DDI x 2. Able to flex extend ankle and toes. Pedal pulse strong.    Skin:     General: Skin is warm.      Capillary Refill: Capillary refill takes less than 2 seconds.   Neurological:      Mental Status: She is oriented to person, place, and time.   Psychiatric:         Mood and Affect: Mood normal.         Behavior: Behavior normal.       Significant Labs: All pertinent labs within the past 24 hours have been reviewed.  CBC:   Recent Labs   Lab 06/04/22  1213 06/05/22  0441   WBC 14.89* 12.16   HGB 11.7* 9.7*   HCT 34.8* 29.5*    207     CMP:   Recent Labs   Lab 06/04/22  1213 06/05/22  0441    140   K 3.9 4.1    107   CO2 21* 23   * 114*   BUN 21 19   CREATININE 0.8 0.8   CALCIUM 9.3 8.8   PROT 6.5 5.8*   ALBUMIN 3.5 3.2*   BILITOT 0.7 0.7   ALKPHOS 64 52*   AST 31 27   ALT 32 28   ANIONGAP 11 10   EGFRNONAA >60 >60     Magnesium:   Recent Labs   Lab 06/05/22  0441   MG 2.0       Significant Imaging: I have reviewed all pertinent imaging results/findings within the past 24 hours.

## 2022-06-05 NOTE — CARE UPDATE
06/05/22 0727   Patient Assessment/Suction   Level of Consciousness (AVPU) alert   PRE-TX-O2   O2 Device (Oxygen Therapy) room air   SpO2 100 %   Pulse Oximetry Type Intermittent   $ Pulse Oximetry - Multiple Charge Pulse Oximetry - Multiple   Aerosol Therapy   $ Aerosol Therapy Charges PRN treatment not required   Skin Integrity   $ Wound Care Tech Time 15 min   Area Observed Bridge of nose;Cheek   Skin Appearance without discoloration   Barrier used? Liquid Filled Cushion   Preset CPAP/BiPAP Settings   Mode Of Delivery Standby

## 2022-06-05 NOTE — ASSESSMENT & PLAN NOTE
Chronic, controlled.  Latest blood pressure and vitals reviewed-   Temp:  [97.7 °F (36.5 °C)-98.5 °F (36.9 °C)]   Pulse:  []   Resp:  [9-18]   BP: (110-158)/(52-81)   SpO2:  [94 %-100 %] .   Home meds for hypertension were reviewed and noted below.   Hypertension Medications             metoprolol succinate (TOPROL-XL) 25 MG 24 hr tablet Take 1 tablet (25 mg total) by mouth 2 (two) times a day.    nitroGLYCERIN (NITROSTAT) 0.4 MG SL tablet Place 0.4 mg under the tongue every 5 (five) minutes as needed for Chest pain.          While in the hospital, will manage blood pressure as follows; Continue home antihypertensive regimen    Will utilize p.r.n. blood pressure medication only if patient's blood pressure greater than  180/110 and she develops symptoms such as worsening chest pain or shortness of breath.

## 2022-06-05 NOTE — SUBJECTIVE & OBJECTIVE
Past Medical History:   Diagnosis Date    Anxiety     Constipation     Coronary artery disease     Esophageal stricture     GERD (gastroesophageal reflux disease)     indefinite PPI    Hyperlipidemia     Hypertension     Mitral valve prolapse     ARLETH on CPAP     Osteoporosis        Past Surgical History:   Procedure Laterality Date    APPENDECTOMY      BREAST BIOPSY      BREAST CYST ASPIRATION      BREAST SURGERY  12/2010    Reduction    cardiovascular stress testing  09/19/2017    CHOLECYSTECTOMY  12/13/2017    Laparoscopic- Dr Lai    CORONARY STENT PLACEMENT  2007    echocardiography  09/19/2017    ESOPHAGOGASTRODUODENOSCOPY      TONSILLECTOMY      TOTAL REDUCTION MAMMOPLASTY      TUBAL LIGATION         Review of patient's allergies indicates:  No Known Allergies    Current Facility-Administered Medications   Medication    acetaminophen tablet 650 mg    albuterol-ipratropium 2.5 mg-0.5 mg/3 mL nebulizer solution 3 mL    aluminum-magnesium hydroxide-simethicone 200-200-20 mg/5 mL suspension 30 mL    calcium-vitamin D3 500 mg-5 mcg (200 unit) per tablet 1 tablet    cyclobenzaprine tablet 5 mg    dextrose 10% bolus 125 mL    dextrose 10% bolus 250 mL    ezetimibe tablet 10 mg    glucagon (human recombinant) injection 1 mg    glucose chewable tablet 16 g    glucose chewable tablet 24 g    lactated ringers infusion    magnesium oxide tablet 800 mg    magnesium oxide tablet 800 mg    melatonin tablet 6 mg    methocarbamoL tablet 750 mg    metoprolol succinate (TOPROL-XL) 24 hr tablet 25 mg    morphine injection 2 mg    morphine injection 4 mg    [START ON 6/6/2022] multivitamin tablet    naloxone 0.4 mg/mL injection 0.02 mg    ondansetron injection 4 mg    pantoprazole EC tablet 40 mg    potassium bicarbonate disintegrating tablet 35 mEq    potassium bicarbonate disintegrating tablet 50 mEq    potassium bicarbonate disintegrating tablet 60 mEq    potassium, sodium phosphates 280-160-250 mg packet 2 packet     potassium, sodium phosphates 280-160-250 mg packet 2 packet    potassium, sodium phosphates 280-160-250 mg packet 2 packet    pravastatin tablet 80 mg    senna-docusate 8.6-50 mg per tablet 1 tablet    simethicone chewable tablet 80 mg    sodium chloride 0.9% flush 10 mL     Family History       Problem Relation (Age of Onset)    Breast cancer Mother    Cancer Brother    Diabetes Brother, Maternal Uncle    Hyperlipidemia Mother, Father, Brother    Hypertension Mother    Lung cancer Father, Paternal Uncle    Stroke Maternal Grandmother          Tobacco Use    Smoking status: Never Smoker    Smokeless tobacco: Never Used   Substance and Sexual Activity    Alcohol use: Yes     Comment: occ    Drug use: No    Sexual activity: Never     Review of Systems   Constitutional: Negative for chills and fever.   HENT:  Negative for congestion.    Eyes:  Negative for blurred vision.   Cardiovascular:  Negative for chest pain and syncope.   Respiratory:  Negative for cough and shortness of breath.    Endocrine: Negative for polyuria.   Hematologic/Lymphatic: Negative for bleeding problem. Does not bruise/bleed easily.   Skin:  Negative for rash.   Musculoskeletal:  Positive for falls, joint pain and joint swelling. Negative for muscle cramps, muscle weakness and myalgias.   Gastrointestinal:  Negative for abdominal pain, nausea and vomiting.   Genitourinary:  Negative for flank pain.   Neurological:  Negative for numbness and seizures.   Psychiatric/Behavioral:  Negative for altered mental status.    Allergic/Immunologic: Negative for persistent infections.   Objective:     Vital Signs (Most Recent):  Temp: 98.3 °F (36.8 °C) (06/05/22 0439)  Pulse: 86 (06/05/22 0439)  Resp: 16 (06/05/22 0730)  BP: 131/61 (06/05/22 0439)  SpO2: 95 % (06/05/22 0439) Vital Signs (24h Range):  Temp:  [98.2 °F (36.8 °C)-98.5 °F (36.9 °C)] 98.3 °F (36.8 °C)  Pulse:  [] 86  Resp:  [16-18] 16  SpO2:  [95 %-100 %] 95 %  BP: (122-158)/(55-81) 131/61  "    Weight: 81.6 kg (180 lb)  Height: 4' 11" (149.9 cm)  Body mass index is 36.36 kg/m².      Intake/Output Summary (Last 24 hours) at 6/5/2022 0748  Last data filed at 6/5/2022 0600  Gross per 24 hour   Intake 450 ml   Output 350 ml   Net 100 ml       General    Nursing note and vitals reviewed.  Constitutional: She is oriented to person, place, and time. She appears well-developed and well-nourished. No distress.   HENT:   Head: Atraumatic.   Eyes: EOM are normal.   Cardiovascular:  Normal rate.            Pulmonary/Chest: Effort normal.   Abdominal: Soft.   Neurological: She is alert and oriented to person, place, and time.   Psychiatric: Her behavior is normal.             Right Hip Exam     Inspection   Swelling: present  Deformity of hip.  Erythema: absent    Range of Motion   Abduction:  abnormal   Adduction:  abnormal   Extension:  abnormal   Flexion:  abnormal   External rotation:  abnormal   Internal rotation:  abnormal     Tests   Log Roll: positive    Other   Sensation: normal  Left Hip Exam   Left hip exam is normal.          Vascular Exam     Right Pulses  Dorsalis Pedis:      2+          Significant Labs: BMP:   Recent Labs   Lab 06/05/22  0441   *      K 4.1      CO2 23   BUN 19   CREATININE 0.8   CALCIUM 8.8   MG 2.0     CBC:   Recent Labs   Lab 06/04/22  1213 06/05/22  0441   WBC 14.89* 12.16   HGB 11.7* 9.7*   HCT 34.8* 29.5*    207     CMP:   Recent Labs   Lab 06/04/22  1213 06/05/22  0441    140   K 3.9 4.1    107   CO2 21* 23   * 114*   BUN 21 19   CREATININE 0.8 0.8   CALCIUM 9.3 8.8   PROT 6.5 5.8*   ALBUMIN 3.5 3.2*   BILITOT 0.7 0.7   ALKPHOS 64 52*   AST 31 27   ALT 32 28   ANIONGAP 11 10   EGFRNONAA >60 >60     Coagulation:   Recent Labs   Lab 06/05/22  0441   LABPROT 10.4   INR 1.0   APTT 25.5     All pertinent labs within the past 24 hours have been reviewed.    Significant Imaging: X-Ray: I have reviewed all pertinent results/findings and my " personal findings are:  X-rays of the right femur are reviewed and interpreted which show a midshaft femur fracture with mild displacement

## 2022-06-05 NOTE — PT/OT/SLP PROGRESS
Physical Therapy      Patient Name:  Aimee Palumbo   MRN:  [id number]    Patient with R femur fx- to surgery today. Awaiting po orders.

## 2022-06-05 NOTE — NURSING
Pt with frequent pain during the night, relieved with IV Morphine and muscle relaxants. English traction applied. 16 Georgian gallardo catheter inserted during shift by POLINA Jimenez RN, using sterile technique. Pt tolerated well. LR started at midnight per MD orders. Rounded on pt atleast every 2 hours addressing pain, safety, call light within reach. CHG bath given.

## 2022-06-05 NOTE — ANESTHESIA POSTPROCEDURE EVALUATION
Anesthesia Post Evaluation    Patient: Aimee Palumbo    Procedure(s) Performed: Procedure(s) (LRB):  INSERTION, INTRAMEDULLARY JOHAN, FEMUR (Right)    Final Anesthesia Type: general      Patient location during evaluation: PACU  Patient participation: Yes- Able to Participate  Level of consciousness: awake and alert  Post-procedure vital signs: reviewed and stable  Pain management: adequate  Airway patency: patent    PONV status at discharge: No PONV  Anesthetic complications: no      Cardiovascular status: blood pressure returned to baseline  Respiratory status: unassisted  Hydration status: euvolemic  Follow-up not needed.          Vitals Value Taken Time   /67 06/05/22 1543   Temp 36.3 °C (97.4 °F) 06/05/22 1543   Pulse 100 06/05/22 1543   Resp 16 06/05/22 1543   SpO2 99 % 06/05/22 1543         Event Time   Out of Recovery 06/05/2022 10:45:00         Pain/Yves Score: Pain Rating Prior to Med Admin: 10 (6/5/2022  2:39 PM)  Pain Rating Post Med Admin: 5 (6/5/2022 10:58 AM)  Yves Score: 8 (6/5/2022 10:25 AM)

## 2022-06-06 LAB
ALBUMIN SERPL BCP-MCNC: 2.9 G/DL (ref 3.5–5.2)
ALP SERPL-CCNC: 52 U/L (ref 55–135)
ALT SERPL W/O P-5'-P-CCNC: 23 U/L (ref 10–44)
ANION GAP SERPL CALC-SCNC: 10 MMOL/L (ref 8–16)
ANION GAP SERPL CALC-SCNC: 9 MMOL/L (ref 8–16)
AST SERPL-CCNC: 38 U/L (ref 10–40)
BASOPHILS # BLD AUTO: 0.05 K/UL (ref 0–0.2)
BASOPHILS NFR BLD: 0.5 % (ref 0–1.9)
BILIRUB SERPL-MCNC: 0.7 MG/DL (ref 0.1–1)
BUN SERPL-MCNC: 15 MG/DL (ref 8–23)
BUN SERPL-MCNC: 15 MG/DL (ref 8–23)
CALCIUM SERPL-MCNC: 8.5 MG/DL (ref 8.7–10.5)
CALCIUM SERPL-MCNC: 8.5 MG/DL (ref 8.7–10.5)
CHLORIDE SERPL-SCNC: 101 MMOL/L (ref 95–110)
CHLORIDE SERPL-SCNC: 101 MMOL/L (ref 95–110)
CO2 SERPL-SCNC: 25 MMOL/L (ref 23–29)
CO2 SERPL-SCNC: 26 MMOL/L (ref 23–29)
CREAT SERPL-MCNC: 0.8 MG/DL (ref 0.5–1.4)
CREAT SERPL-MCNC: 0.8 MG/DL (ref 0.5–1.4)
DIFFERENTIAL METHOD: ABNORMAL
EOSINOPHIL # BLD AUTO: 0 K/UL (ref 0–0.5)
EOSINOPHIL NFR BLD: 0.3 % (ref 0–8)
ERYTHROCYTE [DISTWIDTH] IN BLOOD BY AUTOMATED COUNT: 12.9 % (ref 11.5–14.5)
EST. GFR  (AFRICAN AMERICAN): >60 ML/MIN/1.73 M^2
EST. GFR  (AFRICAN AMERICAN): >60 ML/MIN/1.73 M^2
EST. GFR  (NON AFRICAN AMERICAN): >60 ML/MIN/1.73 M^2
EST. GFR  (NON AFRICAN AMERICAN): >60 ML/MIN/1.73 M^2
GLUCOSE SERPL-MCNC: 133 MG/DL (ref 70–110)
GLUCOSE SERPL-MCNC: 134 MG/DL (ref 70–110)
HCT VFR BLD AUTO: 25.1 % (ref 37–48.5)
HGB BLD-MCNC: 8.5 G/DL (ref 12–16)
IMM GRANULOCYTES # BLD AUTO: 0.06 K/UL (ref 0–0.04)
IMM GRANULOCYTES NFR BLD AUTO: 0.5 % (ref 0–0.5)
LYMPHOCYTES # BLD AUTO: 1.8 K/UL (ref 1–4.8)
LYMPHOCYTES NFR BLD: 16.1 % (ref 18–48)
MAGNESIUM SERPL-MCNC: 2.1 MG/DL (ref 1.6–2.6)
MCH RBC QN AUTO: 31.4 PG (ref 27–31)
MCHC RBC AUTO-ENTMCNC: 33.9 G/DL (ref 32–36)
MCV RBC AUTO: 93 FL (ref 82–98)
MONOCYTES # BLD AUTO: 0.9 K/UL (ref 0.3–1)
MONOCYTES NFR BLD: 8.1 % (ref 4–15)
NEUTROPHILS # BLD AUTO: 8.2 K/UL (ref 1.8–7.7)
NEUTROPHILS NFR BLD: 74.5 % (ref 38–73)
NRBC BLD-RTO: 0 /100 WBC
PHOSPHATE SERPL-MCNC: 2.8 MG/DL (ref 2.7–4.5)
PLATELET # BLD AUTO: 189 K/UL (ref 150–450)
PMV BLD AUTO: 11.7 FL (ref 9.2–12.9)
POTASSIUM SERPL-SCNC: 4.2 MMOL/L (ref 3.5–5.1)
POTASSIUM SERPL-SCNC: 4.3 MMOL/L (ref 3.5–5.1)
PROT SERPL-MCNC: 5.8 G/DL (ref 6–8.4)
RBC # BLD AUTO: 2.71 M/UL (ref 4–5.4)
SODIUM SERPL-SCNC: 136 MMOL/L (ref 136–145)
SODIUM SERPL-SCNC: 136 MMOL/L (ref 136–145)
WBC # BLD AUTO: 10.95 K/UL (ref 3.9–12.7)

## 2022-06-06 PROCEDURE — 84100 ASSAY OF PHOSPHORUS: CPT | Performed by: ORTHOPAEDIC SURGERY

## 2022-06-06 PROCEDURE — 94660 CPAP INITIATION&MGMT: CPT

## 2022-06-06 PROCEDURE — 97165 OT EVAL LOW COMPLEX 30 MIN: CPT

## 2022-06-06 PROCEDURE — 97110 THERAPEUTIC EXERCISES: CPT

## 2022-06-06 PROCEDURE — 99900035 HC TECH TIME PER 15 MIN (STAT)

## 2022-06-06 PROCEDURE — 83735 ASSAY OF MAGNESIUM: CPT | Performed by: ORTHOPAEDIC SURGERY

## 2022-06-06 PROCEDURE — 80053 COMPREHEN METABOLIC PANEL: CPT | Performed by: ORTHOPAEDIC SURGERY

## 2022-06-06 PROCEDURE — 36415 COLL VENOUS BLD VENIPUNCTURE: CPT | Performed by: ORTHOPAEDIC SURGERY

## 2022-06-06 PROCEDURE — 63600175 PHARM REV CODE 636 W HCPCS: Performed by: ORTHOPAEDIC SURGERY

## 2022-06-06 PROCEDURE — 97162 PT EVAL MOD COMPLEX 30 MIN: CPT

## 2022-06-06 PROCEDURE — 25000003 PHARM REV CODE 250: Performed by: ORTHOPAEDIC SURGERY

## 2022-06-06 PROCEDURE — 63600175 PHARM REV CODE 636 W HCPCS

## 2022-06-06 PROCEDURE — 97530 THERAPEUTIC ACTIVITIES: CPT

## 2022-06-06 PROCEDURE — 25000003 PHARM REV CODE 250

## 2022-06-06 PROCEDURE — 94799 UNLISTED PULMONARY SVC/PX: CPT

## 2022-06-06 PROCEDURE — 27000221 HC OXYGEN, UP TO 24 HOURS

## 2022-06-06 PROCEDURE — 12000002 HC ACUTE/MED SURGE SEMI-PRIVATE ROOM

## 2022-06-06 PROCEDURE — 94761 N-INVAS EAR/PLS OXIMETRY MLT: CPT

## 2022-06-06 PROCEDURE — 97535 SELF CARE MNGMENT TRAINING: CPT

## 2022-06-06 PROCEDURE — 85025 COMPLETE CBC W/AUTO DIFF WBC: CPT | Performed by: ORTHOPAEDIC SURGERY

## 2022-06-06 RX ORDER — DIPHENHYDRAMINE HCL 25 MG
25 CAPSULE ORAL EVERY 6 HOURS PRN
Status: DISCONTINUED | OUTPATIENT
Start: 2022-06-06 | End: 2022-06-09 | Stop reason: HOSPADM

## 2022-06-06 RX ORDER — ENOXAPARIN SODIUM 100 MG/ML
40 INJECTION SUBCUTANEOUS EVERY 24 HOURS
Status: DISCONTINUED | OUTPATIENT
Start: 2022-06-06 | End: 2022-06-06

## 2022-06-06 RX ORDER — ENOXAPARIN SODIUM 100 MG/ML
40 INJECTION SUBCUTANEOUS EVERY 24 HOURS
Status: DISCONTINUED | OUTPATIENT
Start: 2022-06-06 | End: 2022-06-07

## 2022-06-06 RX ADMIN — ENOXAPARIN SODIUM 40 MG: 100 INJECTION SUBCUTANEOUS at 01:06

## 2022-06-06 RX ADMIN — EZETIMIBE 10 MG: 10 TABLET ORAL at 08:06

## 2022-06-06 RX ADMIN — OXYCODONE AND ACETAMINOPHEN 1 TABLET: 10; 325 TABLET ORAL at 01:06

## 2022-06-06 RX ADMIN — PRAVASTATIN SODIUM 80 MG: 40 TABLET ORAL at 08:06

## 2022-06-06 RX ADMIN — METHOCARBAMOL TABLETS 750 MG: 750 TABLET, COATED ORAL at 01:06

## 2022-06-06 RX ADMIN — OXYCODONE AND ACETAMINOPHEN 1 TABLET: 10; 325 TABLET ORAL at 05:06

## 2022-06-06 RX ADMIN — MORPHINE SULFATE 4 MG: 4 INJECTION INTRAVENOUS at 07:06

## 2022-06-06 RX ADMIN — Medication 1 TABLET: at 08:06

## 2022-06-06 RX ADMIN — Medication 1 TABLET: at 05:06

## 2022-06-06 RX ADMIN — METHOCARBAMOL TABLETS 750 MG: 750 TABLET, COATED ORAL at 05:06

## 2022-06-06 RX ADMIN — METOPROLOL SUCCINATE 25 MG: 25 TABLET, EXTENDED RELEASE ORAL at 08:06

## 2022-06-06 RX ADMIN — DOCUSATE SODIUM AND SENNOSIDES 1 TABLET: 8.6; 5 TABLET, FILM COATED ORAL at 08:06

## 2022-06-06 RX ADMIN — PANTOPRAZOLE SODIUM 40 MG: 40 TABLET, DELAYED RELEASE ORAL at 08:06

## 2022-06-06 RX ADMIN — DIPHENHYDRAMINE HYDROCHLORIDE 25 MG: 25 CAPSULE ORAL at 05:06

## 2022-06-06 RX ADMIN — METHOCARBAMOL TABLETS 750 MG: 750 TABLET, COATED ORAL at 08:06

## 2022-06-06 RX ADMIN — OXYCODONE AND ACETAMINOPHEN 1 TABLET: 10; 325 TABLET ORAL at 04:06

## 2022-06-06 RX ADMIN — SODIUM CHLORIDE, SODIUM LACTATE, POTASSIUM CHLORIDE, AND CALCIUM CHLORIDE: .6; .31; .03; .02 INJECTION, SOLUTION INTRAVENOUS at 08:06

## 2022-06-06 RX ADMIN — THERA TABS 1 TABLET: TAB at 08:06

## 2022-06-06 RX ADMIN — MORPHINE SULFATE 4 MG: 4 INJECTION INTRAVENOUS at 02:06

## 2022-06-06 RX ADMIN — OXYCODONE AND ACETAMINOPHEN 1 TABLET: 10; 325 TABLET ORAL at 08:06

## 2022-06-06 NOTE — SUBJECTIVE & OBJECTIVE
Principal Problem:Closed fracture of shaft of right femur      Interval History:  Got up with physical therapy today.  Sitting in chair comfortably.  Pain well controlled.  Complaining of left hand swelling and bruising    Review of patient's allergies indicates:  No Known Allergies    Current Facility-Administered Medications   Medication    acetaminophen tablet 650 mg    albuterol-ipratropium 2.5 mg-0.5 mg/3 mL nebulizer solution 3 mL    aluminum-magnesium hydroxide-simethicone 200-200-20 mg/5 mL suspension 30 mL    calcium-vitamin D3 500 mg-5 mcg (200 unit) per tablet 1 tablet    cyclobenzaprine tablet 5 mg    dextrose 10% bolus 125 mL    dextrose 10% bolus 250 mL    diphenhydrAMINE capsule 25 mg    ezetimibe tablet 10 mg    glucagon (human recombinant) injection 1 mg    glucose chewable tablet 16 g    glucose chewable tablet 24 g    lactated ringers infusion    loperamide capsule 2 mg    magnesium oxide tablet 800 mg    magnesium oxide tablet 800 mg    melatonin tablet 6 mg    methocarbamoL tablet 750 mg    metoprolol succinate (TOPROL-XL) 24 hr tablet 25 mg    morphine injection 2 mg    morphine injection 4 mg    multivitamin tablet    naloxone 0.4 mg/mL injection 0.02 mg    ondansetron injection 4 mg    oxyCODONE-acetaminophen  mg per tablet 1 tablet    pantoprazole EC tablet 40 mg    potassium bicarbonate disintegrating tablet 35 mEq    potassium bicarbonate disintegrating tablet 50 mEq    potassium bicarbonate disintegrating tablet 60 mEq    potassium, sodium phosphates 280-160-250 mg packet 2 packet    potassium, sodium phosphates 280-160-250 mg packet 2 packet    potassium, sodium phosphates 280-160-250 mg packet 2 packet    pravastatin tablet 80 mg    senna-docusate 8.6-50 mg per tablet 1 tablet    simethicone chewable tablet 80 mg    sodium chloride 0.9% flush 10 mL    sodium chloride 0.9% flush 10 mL    sodium chloride 0.9% flush 10 mL    sodium chloride 0.9% flush 10 mL     Objective:     Vital  "Signs (Most Recent):  Temp: 98.1 °F (36.7 °C) (06/06/22 0738)  Pulse: (!) 118 (06/06/22 0740)  Resp: 18 (06/06/22 0845)  BP: (!) 130/59 (06/06/22 0838)  SpO2: 97 % (06/06/22 0740)   Vital Signs (24h Range):  Temp:  [97 °F (36.1 °C)-98.9 °F (37.2 °C)] 98.1 °F (36.7 °C)  Pulse:  [] 118  Resp:  [14-22] 18  SpO2:  [88 %-99 %] 97 %  BP: (113-147)/(59-67) 130/59     Weight: 81.6 kg (180 lb)  Height: 4' 11" (149.9 cm)  Body mass index is 36.36 kg/m².      Intake/Output Summary (Last 24 hours) at 6/6/2022 1217  Last data filed at 6/6/2022 0550  Gross per 24 hour   Intake 1907.81 ml   Output 1300 ml   Net 607.81 ml       General    Nursing note and vitals reviewed.  Constitutional: She is oriented to person, place, and time. She appears well-developed and well-nourished. No distress.   HENT:   Head: Atraumatic.   Eyes: EOM are normal.   Cardiovascular:  Normal rate.            Pulmonary/Chest: Effort normal.   Abdominal: Soft.   Neurological: She is alert and oriented to person, place, and time.   Psychiatric: Her behavior is normal.             Right Hip Exam     Inspection   Swelling: present  Erythema: absent    Tenderness   The patient tender to palpation of the trochanteric bursa.    Range of Motion   Abduction:  abnormal   Adduction:  abnormal   Extension:  abnormal   Flexion:  abnormal   External rotation:  abnormal   Internal rotation:  abnormal     Tests   Log Roll: positive    Other   Sensation: normal    Comments:  Dressings are clean dry and intact.  Compartments are soft.  Left Hip Exam   Left hip exam is normal.          Vascular Exam     Right Pulses  Dorsalis Pedis:      2+          Significant Labs: BMP:   Recent Labs   Lab 06/06/22  0416   *  133*     136   K 4.3  4.2     101   CO2 26  25   BUN 15  15   CREATININE 0.8  0.8   CALCIUM 8.5*  8.5*   MG 2.1     CBC:   Recent Labs   Lab 06/05/22  0441 06/06/22  0416   WBC 12.16 10.95   HGB 9.7* 8.5*   HCT 29.5* 25.1*    189 "     Coagulation:   Recent Labs   Lab 06/05/22  0441   LABPROT 10.4   INR 1.0   APTT 25.5     All pertinent labs within the past 24 hours have been reviewed.    Significant Imaging: X-Ray: I have reviewed all pertinent results/findings and my personal findings are:  X-rays of the left wrist show some soft tissue swelling.  Moderate CMC arthritis.  No acute fracture noted.

## 2022-06-06 NOTE — PROGRESS NOTES
[facility] Hospital Medicine  Progress Note    Patient Name: Aimee Palumbo  MRN: [id number]  Patient Class: IP- Inpatient   Admission Date: 6/4/2022  Length of Stay: 2 days  Attending Physician: Tanner Chappell MD  Primary Care Provider: Amor Lamas Jr, MD        Subjective:     Principal Problem:Closed fracture of shaft of right femur        HPI:  Aimee Palumbo is a 71-year-old female with past medical history significant for coronary artery disease, GERD, hyperlipidemia, hypertension, obstructive sleep apnea on CPAP, and osteoporosis who presented to the emergency department today after a mechanical fall at home for further evaluation right lower extremity pain.  Pt states that she was at the home of a friend when she tripped and fell on the sidewalk.  ED evaluation was significant for a closed displaced right midshaft femur fracture.  Per report, the orthopedic surgeon on-call was contacted who states he will take her to surgery tomorrow morning.  Patient has no further complaints at this time.      Overview/Hospital Course:  Aimee Palumbo is a 71 year old  female who was admitted after a fall at home and found to have closed displaced right midshaft femur fracture. She was admitted to Hospital Medicine and followed closely. Ortho inserted an intermedullary dorian into the right femur 6/5/2022. Pain was managed per ortho. PT/OT were consulted for eval/Tx.       Interval History: Notes reviewed, no acute events overnight. Patient seen resting in bed side chair in no acute distress. Patient seen following working with physical therapy. She states PT went well. She denies any pain at this time. X ray of left wrist showed no fracture or dislocation. Soft tissue swelling seen on x ray. Encouraged IS. Will continue to follow orthopedic recommendations.      Review of Systems  Objective:     Vital Signs (Most Recent):  Temp: 98.1 °F (36.7 °C) (06/06/22 0738)  Pulse: (!) 118 (06/06/22  0740)  Resp: 18 (06/06/22 0845)  BP: (!) 130/59 (06/06/22 0838)  SpO2: 97 % (06/06/22 0740)   Vital Signs (24h Range):  Temp:  [97 °F (36.1 °C)-98.9 °F (37.2 °C)] 98.1 °F (36.7 °C)  Pulse:  [] 118  Resp:  [14-22] 18  SpO2:  [88 %-99 %] 97 %  BP: (113-147)/(59-67) 130/59     Weight: 81.6 kg (180 lb)  Body mass index is 36.36 kg/m².    Intake/Output Summary (Last 24 hours) at 6/6/2022 1206  Last data filed at 6/6/2022 0550  Gross per 24 hour   Intake 1907.81 ml   Output 1300 ml   Net 607.81 ml      Physical Exam    Significant Labs: All pertinent labs within the past 24 hours have been reviewed.  CBC:   Recent Labs   Lab 06/04/22  1213 06/05/22  0441 06/06/22  0416   WBC 14.89* 12.16 10.95   HGB 11.7* 9.7* 8.5*   HCT 34.8* 29.5* 25.1*    207 189     CMP:   Recent Labs   Lab 06/04/22  1213 06/05/22  0441 06/06/22  0416    140 136  136   K 3.9 4.1 4.3  4.2    107 101  101   CO2 21* 23 26  25   * 114* 134*  133*   BUN 21 19 15  15   CREATININE 0.8 0.8 0.8  0.8   CALCIUM 9.3 8.8 8.5*  8.5*   PROT 6.5 5.8* 5.8*   ALBUMIN 3.5 3.2* 2.9*   BILITOT 0.7 0.7 0.7   ALKPHOS 64 52* 52*   AST 31 27 38   ALT 32 28 23   ANIONGAP 11 10 9  10   EGFRNONAA >60 >60 >60  >60       Significant Imaging: I have reviewed all pertinent imaging results/findings within the past 24 hours.      Assessment/Plan:      * Closed fracture of shaft of right femur   orthopedic surgery for post op management  surg done 6/5/2022 am  Pain control per surg post op  Barber's traction per orthopedic recommendation  PT/OT consult following surgery  CM for dc planning  Continue to follow ortho recs      ARLETH on CPAP  Chronic; continue chronic CPAP      Obesity  Body mass index is 36.36 kg/m². Morbid obesity complicates all aspects of disease management from diagnostic modalities to treatment. Weight loss encouraged and health benefits explained to patient.          Major depression        Coronary artery disease  Patient with  known CAD s/p stent placement, which is controlled Will continue Statin and monitor for S/Sx of angina/ACS. Continue to monitor on telemetry.   Hold chronic ASA until after surgery.          Gastroesophageal reflux disease with esophagitis  Chronic; stable; continue chronic PPI      Hyperlipidemia   Patient is chronically on statin.will continue for now. Monitor clinically. Last LDL was No results found for: LDLCALC         Hypertension  Chronic, controlled.  Latest blood pressure and vitals reviewed-   Temp:  [97 °F (36.1 °C)-98.9 °F (37.2 °C)]   Pulse:  []   Resp:  [14-22]   BP: (113-147)/(59-67)   SpO2:  [88 %-99 %] .   Home meds for hypertension were reviewed and noted below.   Hypertension Medications             metoprolol succinate (TOPROL-XL) 25 MG 24 hr tablet Take 1 tablet (25 mg total) by mouth 2 (two) times a day.    nitroGLYCERIN (NITROSTAT) 0.4 MG SL tablet Place 0.4 mg under the tongue every 5 (five) minutes as needed for Chest pain.          While in the hospital, will manage blood pressure as follows; Continue home antihypertensive regimen    Will utilize p.r.n. blood pressure medication only if patient's blood pressure greater than  180/110 and she develops symptoms such as worsening chest pain or shortness of breath.        Osteoporosis  Chronic; continue daily calcium/vit d supplement        VTE Risk Mitigation (From admission, onward)         Ordered     IP VTE HIGH RISK PATIENT  Once         06/04/22 1645     Place sequential compression device  Until discontinued         06/04/22 1645     Reason for No Pharmacological VTE Prophylaxis  Once        Question:  Reasons:  Answer:  Risk of Bleeding    06/04/22 1645                Discharge Planning   MOR:      Code Status: Full Code   Is the patient medically ready for discharge?:     Reason for patient still in hospital (select all that apply): Patient trending condition and PT / OT recommendations                     Sarwat Crocker,  RITO  Department of Hospital Medicine   [facility]

## 2022-06-06 NOTE — RESPIRATORY THERAPY
02 saturation 94% on nc at 2lpm.  Patient averaging 1750ml on IS.  PRN tx not required at this time.

## 2022-06-06 NOTE — ASSESSMENT & PLAN NOTE
Chronic, controlled.  Latest blood pressure and vitals reviewed-   Temp:  [97 °F (36.1 °C)-98.9 °F (37.2 °C)]   Pulse:  []   Resp:  [14-22]   BP: (113-147)/(59-67)   SpO2:  [88 %-99 %] .   Home meds for hypertension were reviewed and noted below.   Hypertension Medications             metoprolol succinate (TOPROL-XL) 25 MG 24 hr tablet Take 1 tablet (25 mg total) by mouth 2 (two) times a day.    nitroGLYCERIN (NITROSTAT) 0.4 MG SL tablet Place 0.4 mg under the tongue every 5 (five) minutes as needed for Chest pain.          While in the hospital, will manage blood pressure as follows; Continue home antihypertensive regimen    Will utilize p.r.n. blood pressure medication only if patient's blood pressure greater than  180/110 and she develops symptoms such as worsening chest pain or shortness of breath.

## 2022-06-06 NOTE — ASSESSMENT & PLAN NOTE
Continue with physical therapy.  Weightbearing as tolerated right lower extremity with a rolling walker.  Patient will likely need placement had rehab for skilled nursing facility.  Monitor H&H.  Patient was fairly anemic on arrival.  Pain control.  DVT prophylaxis

## 2022-06-06 NOTE — PROGRESS NOTES
[facility]  Orthopedics  Progress Note    Patient Name: Aimee Palumbo  MRN: [id number]  Admission Date: 6/4/2022  Hospital Length of Stay: 2 days  Attending Provider: Tanner Chappell MD  Primary Care Provider: Amor Lamas Jr, MD  Follow-up For: Procedure(s) (LRB):  INSERTION, INTRAMEDULLARY JOHAN, FEMUR (Right)    Post-Operative Day: 1 Day Post-Op  Subjective:     Principal Problem:Closed fracture of shaft of right femur      Interval History:  Got up with physical therapy today.  Sitting in chair comfortably.  Pain well controlled.  Complaining of left hand swelling and bruising    Review of patient's allergies indicates:  No Known Allergies    Current Facility-Administered Medications   Medication    acetaminophen tablet 650 mg    albuterol-ipratropium 2.5 mg-0.5 mg/3 mL nebulizer solution 3 mL    aluminum-magnesium hydroxide-simethicone 200-200-20 mg/5 mL suspension 30 mL    calcium-vitamin D3 500 mg-5 mcg (200 unit) per tablet 1 tablet    cyclobenzaprine tablet 5 mg    dextrose 10% bolus 125 mL    dextrose 10% bolus 250 mL    diphenhydrAMINE capsule 25 mg    ezetimibe tablet 10 mg    glucagon (human recombinant) injection 1 mg    glucose chewable tablet 16 g    glucose chewable tablet 24 g    lactated ringers infusion    loperamide capsule 2 mg    magnesium oxide tablet 800 mg    magnesium oxide tablet 800 mg    melatonin tablet 6 mg    methocarbamoL tablet 750 mg    metoprolol succinate (TOPROL-XL) 24 hr tablet 25 mg    morphine injection 2 mg    morphine injection 4 mg    multivitamin tablet    naloxone 0.4 mg/mL injection 0.02 mg    ondansetron injection 4 mg    oxyCODONE-acetaminophen  mg per tablet 1 tablet    pantoprazole EC tablet 40 mg    potassium bicarbonate disintegrating tablet 35 mEq    potassium bicarbonate disintegrating tablet 50 mEq    potassium bicarbonate disintegrating tablet 60 mEq    potassium, sodium phosphates 280-160-250 mg packet  "2 packet    potassium, sodium phosphates 280-160-250 mg packet 2 packet    potassium, sodium phosphates 280-160-250 mg packet 2 packet    pravastatin tablet 80 mg    senna-docusate 8.6-50 mg per tablet 1 tablet    simethicone chewable tablet 80 mg    sodium chloride 0.9% flush 10 mL    sodium chloride 0.9% flush 10 mL    sodium chloride 0.9% flush 10 mL    sodium chloride 0.9% flush 10 mL     Objective:     Vital Signs (Most Recent):  Temp: 98.1 °F (36.7 °C) (06/06/22 0738)  Pulse: (!) 118 (06/06/22 0740)  Resp: 18 (06/06/22 0845)  BP: (!) 130/59 (06/06/22 0838)  SpO2: 97 % (06/06/22 0740)   Vital Signs (24h Range):  Temp:  [97 °F (36.1 °C)-98.9 °F (37.2 °C)] 98.1 °F (36.7 °C)  Pulse:  [] 118  Resp:  [14-22] 18  SpO2:  [88 %-99 %] 97 %  BP: (113-147)/(59-67) 130/59     Weight: 81.6 kg (180 lb)  Height: 4' 11" (149.9 cm)  Body mass index is 36.36 kg/m².      Intake/Output Summary (Last 24 hours) at 6/6/2022 1217  Last data filed at 6/6/2022 0550  Gross per 24 hour   Intake 1907.81 ml   Output 1300 ml   Net 607.81 ml       General    Nursing note and vitals reviewed.  Constitutional: She is oriented to person, place, and time. She appears well-developed and well-nourished. No distress.   HENT:   Head: Atraumatic.   Eyes: EOM are normal.   Cardiovascular:  Normal rate.            Pulmonary/Chest: Effort normal.   Abdominal: Soft.   Neurological: She is alert and oriented to person, place, and time.   Psychiatric: Her behavior is normal.             Right Hip Exam     Inspection   Swelling: present  Erythema: absent    Tenderness   The patient tender to palpation of the trochanteric bursa.    Range of Motion   Abduction:  abnormal   Adduction:  abnormal   Extension:  abnormal   Flexion:  abnormal   External rotation:  abnormal   Internal rotation:  abnormal     Tests   Log Roll: positive    Other   Sensation: normal    Comments:  Dressings are clean dry and intact.  Compartments are soft.  Left Hip Exam "   Left hip exam is normal.          Vascular Exam     Right Pulses  Dorsalis Pedis:      2+          Significant Labs: BMP:   Recent Labs   Lab 06/06/22 0416   *  133*     136   K 4.3  4.2     101   CO2 26  25   BUN 15  15   CREATININE 0.8  0.8   CALCIUM 8.5*  8.5*   MG 2.1     CBC:   Recent Labs   Lab 06/05/22 0441 06/06/22 0416   WBC 12.16 10.95   HGB 9.7* 8.5*   HCT 29.5* 25.1*    189     Coagulation:   Recent Labs   Lab 06/05/22 0441   LABPROT 10.4   INR 1.0   APTT 25.5     All pertinent labs within the past 24 hours have been reviewed.    Significant Imaging: X-Ray: I have reviewed all pertinent results/findings and my personal findings are:  X-rays of the left wrist show some soft tissue swelling.  Moderate CMC arthritis.  No acute fracture noted.    Assessment/Plan:     * Closed fracture of shaft of right femur  Continue with physical therapy.  Weightbearing as tolerated right lower extremity with a rolling walker.  Patient will likely need placement had rehab for skilled nursing facility.  Monitor H&H.  Patient was fairly anemic on arrival.  Pain control.  DVT prophylaxis            Choco Cristobal MD  Orthopedics  [facility]

## 2022-06-06 NOTE — ASSESSMENT & PLAN NOTE
orthopedic surgery for post op management  surg done 6/5/2022 am  Pain control per surg post op  Barber's traction per orthopedic recommendation  PT/OT consult following surgery  CM for dc planning  Continue to follow ortho recs

## 2022-06-06 NOTE — PT/OT/SLP EVAL
Occupational Therapy   Evaluation    Name: Aimee Palumbo  MRN: [id number]  Admitting Diagnosis:  Closed fracture of shaft of right femur  Recent Surgery: Procedure(s) (LRB):  INSERTION, INTRAMEDULLARY JOHAN, FEMUR (Right) 1 Day Post-Op    Recommendations:     Discharge Recommendations: nursing facility, skilled, rehabilitation facility  Discharge Equipment Recommendations:  walker, rolling, bedside commode, hip kit  Barriers to discharge:  Decreased caregiver support    Assessment:     Aimee Palumbo is a 71 y.o. female with a medical diagnosis of Closed fracture of shaft of right femur.  She presents with c/o dull pain in LUE. Noted minimal edema in LUE (wrist and hand) as well. Patient appeared somnolent throughout evaluation requiring occasional verbal cues to maintain mental alertness. Despite somnolence, patient was able to perform grooming tasks while seated in chair with Supervision and set up assist. Further education was limited due to somnolence. Patient would benefit from Rehab vs SNF placement to return back to [facility]. Performance deficits affecting function: weakness, impaired endurance, impaired self care skills, impaired functional mobilty, decreased lower extremity function, decreased ROM, pain, edema, impaired balance.      Rehab Prognosis: Good; patient would benefit from acute skilled OT services to address these deficits and reach maximum level of function.       Plan:     Patient to be seen 4 x/week to address the above listed problems via self-care/home management, therapeutic activities, therapeutic exercises  · Plan of Care Expires: 06/27/22  · Plan of Care Reviewed with: patient    Subjective     Chief Complaint: L wrist/hand edema  Patient/Family Comments/goals: none    Occupational Profile:  Living Environment: Patient lives alone in a trailer with 3 RANDY and B handrails.   Previous level of function: Patient was independent with ADLs and mobility.   Equipment Used at Home:   none  Assistance upon Discharge: Patient will receive limited assistance from friends.     Pain/Comfort:  · Pain Rating 1:  (did not rate)  · Location - Side 1: Left  · Location - Orientation 1: lower  · Location 1: arm  · Pain Addressed 1: Reposition, Distraction    Patients cultural, spiritual, Cheondoism conflicts given the current situation:      Objective:     Communicated with: nurse  prior to session.  Patient found up in chair with peripheral IV, telemetry, gallardo catheter, oxygen upon OT entry to room.    General Precautions: Standard, fall   Orthopedic Precautions:RLE weight bearing as tolerated   Braces: N/A  Respiratory Status: Nasal cannula, flow 2 L/min    Occupational Performance:    Bed Mobility:    · Not assessed; patient seated in chair upon arrival. See PT notes.     Functional Mobility/Transfers:  · Patient declined d/t fatigue; See PT notes.     Activities of Daily Living:  · Grooming: supervision with oral and facial hygiene while seated in chair  · Lower Body Dressing: maximal assistance to don/doff socks while seated in chair  · Toileting: dependence with gallardo cath in place    Cognitive/Visual Perceptual:  Cognitive/Psychosocial Skills:     -       Oriented to: x4   -       Follows Commands/attention:Follows multistep  commands  -       Communication: clear/fluent  -       Safety awareness/insight to disability: intact   -       Mood/Affect/Coping skills/emotional control: Appropriate to situation and Cooperative  Visual/Perceptual:      -Intact     Physical Exam:  Postural examination/scapula alignment:    -       Rounded shoulders  -       Forward head  Upper Extremity Range of Motion:     -       Right Upper Extremity: WFL  -       Left Upper Extremity: WFL  Upper Extremity Strength:    -       Right Upper Extremity: WFL  -       Left Upper Extremity: WFL   Strength:    -       Right Upper Extremity: WFL  -       Left Upper Extremity: WFL  Fine Motor Coordination:    -        Intact  Gross motor coordination:   WFL in BUE    AMPA 6 Click ADL:  AMPA Total Score: 17    Treatment & Education:  OT ed pt on OT role & POC as well as discharge recommendations.    Education:    Patient left up in chair with all lines intact, call button in reach, chair alarm on and nurse notified    GOALS:   Multidisciplinary Problems     Occupational Therapy Goals        Problem: Occupational Therapy    Goal Priority Disciplines Outcome Interventions   Occupational Therapy Goal     OT, PT/OT Ongoing, Progressing    Description: Goals to be met by:      Patient will increase functional independence with ADLs by performing:    LE Dressing with Supervision and Assistive Devices as needed.  Grooming while standing at sink with Supervision.  Toileting from toilet with Supervision for hygiene and clothing management.   Supine to sit with Supervision.  Toilet transfer to toilet with Supervision.                     History:     Past Medical History:   Diagnosis Date    Anxiety     Constipation     Coronary artery disease     Esophageal stricture     GERD (gastroesophageal reflux disease)     indefinite PPI    Hyperlipidemia     Hypertension     Mitral valve prolapse     ARLETH on CPAP     Osteoporosis        Past Surgical History:   Procedure Laterality Date    APPENDECTOMY      BREAST BIOPSY      BREAST CYST ASPIRATION      BREAST SURGERY  12/2010    Reduction    cardiovascular stress testing  09/19/2017    CHOLECYSTECTOMY  12/13/2017    Laparoscopic- Dr Lai    CORONARY STENT PLACEMENT  2007    echocardiography  09/19/2017    ESOPHAGOGASTRODUODENOSCOPY      TONSILLECTOMY      TOTAL REDUCTION MAMMOPLASTY      TUBAL LIGATION         Time Tracking:     OT Date of Treatment: 06/06/22  OT Start Time: 1100  OT Stop Time: 1122  OT Total Time (min): 22 min    Billable Minutes:Evaluation 10  Self Care/Home Management 12    6/6/2022

## 2022-06-06 NOTE — PLAN OF CARE
[facility]  Initial Discharge Assessment       Primary Care Provider: Amor Lamas Jr, MD    Admission Diagnosis: Pre-operative clearance [Z01.818]  Right hip pain [M25.551]  Closed fracture of shaft of right femur, unspecified fracture morphology, initial encounter [S72.301A]    Admission Date: 6/4/2022  Expected Discharge Date: VERONICA Gamez RN CM completed discharge assessment and SW entered data in Epic.  No HH, BIPAP at home.  No Coumadin or dialysis.     Discharge Barriers Identified: None    Payor: MEDICARE / Plan: MEDICARE PART A & B / Product Type: Government /     Extended Emergency Contact Information  Primary Emergency Contact: LinwoodBobby  Mobile Phone: [phone]  Relation: Son  Preferred language: English   needed? No    Discharge Plan A: Home  Discharge Plan B: Home      [id number] - [place] - [address] AT [facility] &   [address] [address]  Phone: [phone] Fax: [phone]      Initial Assessment (most recent)     Adult Discharge Assessment - 06/06/22 1706        Discharge Assessment    Are you on dialysis? No     Do you take coumadin? No

## 2022-06-06 NOTE — SUBJECTIVE & OBJECTIVE
Interval History: Notes reviewed, no acute events overnight. Patient seen resting in bed side chair in no acute distress. Patient seen following working with physical therapy. She states PT went well. She denies any pain at this time. X ray of left wrist showed no fracture or dislocation. Soft tissue swelling seen on x ray. Encouraged IS. Will continue to follow orthopedic recommendations.      Review of Systems  Objective:     Vital Signs (Most Recent):  Temp: 98.1 °F (36.7 °C) (06/06/22 0738)  Pulse: (!) 118 (06/06/22 0740)  Resp: 18 (06/06/22 0845)  BP: (!) 130/59 (06/06/22 0838)  SpO2: 97 % (06/06/22 0740)   Vital Signs (24h Range):  Temp:  [97 °F (36.1 °C)-98.9 °F (37.2 °C)] 98.1 °F (36.7 °C)  Pulse:  [] 118  Resp:  [14-22] 18  SpO2:  [88 %-99 %] 97 %  BP: (113-147)/(59-67) 130/59     Weight: 81.6 kg (180 lb)  Body mass index is 36.36 kg/m².    Intake/Output Summary (Last 24 hours) at 6/6/2022 1206  Last data filed at 6/6/2022 0550  Gross per 24 hour   Intake 1907.81 ml   Output 1300 ml   Net 607.81 ml      Physical Exam    Significant Labs: All pertinent labs within the past 24 hours have been reviewed.  CBC:   Recent Labs   Lab 06/04/22  1213 06/05/22  0441 06/06/22  0416   WBC 14.89* 12.16 10.95   HGB 11.7* 9.7* 8.5*   HCT 34.8* 29.5* 25.1*    207 189     CMP:   Recent Labs   Lab 06/04/22  1213 06/05/22  0441 06/06/22  0416    140 136  136   K 3.9 4.1 4.3  4.2    107 101  101   CO2 21* 23 26  25   * 114* 134*  133*   BUN 21 19 15  15   CREATININE 0.8 0.8 0.8  0.8   CALCIUM 9.3 8.8 8.5*  8.5*   PROT 6.5 5.8* 5.8*   ALBUMIN 3.5 3.2* 2.9*   BILITOT 0.7 0.7 0.7   ALKPHOS 64 52* 52*   AST 31 27 38   ALT 32 28 23   ANIONGAP 11 10 9  10   EGFRNONAA >60 >60 >60  >60       Significant Imaging: I have reviewed all pertinent imaging results/findings within the past 24 hours.

## 2022-06-06 NOTE — PLAN OF CARE
POC discussed with pt, pt verbalized understanding. Oriented x4. PIV cdi, infusing. NSR/tachy on tele. C/o pain to the right hip, relieved with prn's. Dressing to right hip cdi.CPAP on while asleep. Pulses 2+. Cox draining yellow urine. Educated on the importance of the incentive spirometer use, coughing/deep breathing, and ambulation post op. Call light in reach, bed alarm set, safety maintained. No complaints or requests at this time, will continue to monitor.

## 2022-06-06 NOTE — PLAN OF CARE
POC discussed with pt.  Pt voiced understanding.  Pt pain controlled with pain medication per orders.  IVF infusing without difficulty.  VSS, safety measures maintained throughout shift.  Call light in reach. Pt instructed to call with any needs.

## 2022-06-06 NOTE — PLAN OF CARE
Problem: Physical Therapy  Goal: Physical Therapy Goal  Description: Goals to be met by: 22    Patient will increase functional independence with mobility by performin. Supine to sit with MInimal Assistance  2. Sit to stand transfer with Minimal Assistance  3. Bed to chair transfer with Minimal Assistance using Rolling Walker  4. Gait  x 50 feet with Minimal Assistance using Rolling Walker.   5. Lower extremity exercise program x20 reps    Outcome: Ongoing, Progressing   Pt ambulated approx. 2ft with RW, mod assist, 2L O2 and chair following behind. Pt required mod assist to perform all bed mobility and transfers safely. Recommend rehab vs SNF.

## 2022-06-06 NOTE — PT/OT/SLP PROGRESS
Physical Therapy Treatment    Patient Name:  Aimee Palumbo   MRN:  [id number]    Recommendations:     Discharge Recommendations:  rehabilitation facility, nursing facility, skilled   Discharge Equipment Recommendations: walker, rolling   Barriers to discharge: Decreased caregiver support    Assessment:     Aimee Palumbo is a 71 y.o. female admitted with a medical diagnosis of Closed fracture of shaft of right femur.  She presents with the following impairments/functional limitations:    weakness, impaired mobility/balance. Pt seen BID today. Pt tolerated OOB chair x 2.5 hours. Pt requiring mod assist with sit to stand and able to take few steps with RW WBAT RLE.  Pt less anxious this pm and able to assist more with mobility. Pt to benefit from rehab vs SNF    Rehab Prognosis: Fair; patient would benefit from acute skilled PT services to address these deficits and reach maximum level of function.    Recent Surgery: Procedure(s) (LRB):  INSERTION, INTRAMEDULLARY JOHAN, FEMUR (Right) 1 Day Post-Op    Plan:     During this hospitalization, patient to be seen daily (1-2x/day) to address the identified rehab impairments via gait training, therapeutic activities, therapeutic exercises and progress toward the following goals:    · Plan of Care Expires:  06/30/22    Subjective   Pt pleased about ability to move a little better going back to bed  Chief Complaint: tired  Patient/Family Comments/goals: get well  Pain/Comfort:  ·        Objective:     Communicated with nurse Ha prior to session.  Patient found up in chair with   upon PT entry to room.     General Precautions: Standard, fall   Orthopedic Precautions:RLE weight bearing as tolerated   Braces: N/A  Respiratory Status: Nasal cannula, flow 2 L/min     Functional Mobility:  · Bed Mobility:     · Scooting: moderate assistance  · Sit to Supine: moderate assistance  · Transfers:     · Sit to Stand:  moderate assistance with rolling walker pt able to take 5 steps to  get back to bed with RW      AM-PAC 6 CLICK MOBILITY          Therapeutic Activities and Exercises:   Patient was educated on the importance of OOB activity and functional mobility to negate negative effects of prolonged bed rest during hospitalization, safe transfers and ambulation, and D/C planning   Tolerated 2.5 hrs OOB today  Pt returned to bed and able to assist with scooting HOB using trapeze    Patient left HOB elevated with all lines intact, call button in reach, bed alarm on and nurse sheila notified..    GOALS:   Multidisciplinary Problems     Physical Therapy Goals        Problem: Physical Therapy    Goal Priority Disciplines Outcome Goal Variances Interventions   Physical Therapy Goal     PT, PT/OT Ongoing, Progressing     Description: Goals to be met by: 22    Patient will increase functional independence with mobility by performin. Supine to sit with MInimal Assistance  2. Sit to stand transfer with Minimal Assistance  3. Bed to chair transfer with Minimal Assistance using Rolling Walker  4. Gait  x 50 feet with Minimal Assistance using Rolling Walker.   5. Lower extremity exercise program x20 reps                     Time Tracking:     PT Received On: 22  PT Start Time: 1257     PT Stop Time: 1317  PT Total Time (min): 20 min     Billable Minutes: Therapeutic Activity 20    Treatment Type: Treatment  PT/PTA: PT     PTA Visit Number: 0     2022

## 2022-06-06 NOTE — NURSING
Pt states that when she fell at home she hurt her left arm, and the pain is increasing. Left hand, wrist, and forearm has become more swollen and has a large bruise. Notified NP, xray ordered for this am.

## 2022-06-06 NOTE — PT/OT/SLP EVAL
Physical Therapy Evaluation    Patient Name:  Aimee Palumbo   MRN:  [id number]    Recommendations:     Discharge Recommendations:  rehabilitation facility, nursing facility, skilled   Discharge Equipment Recommendations: walker, rolling   Barriers to discharge: Decreased caregiver support    Assessment:     Aimee Palumbo is a 71 y.o. female admitted with a medical diagnosis of Closed fracture of shaft of right femur.  She presents with the following impairments/functional limitations:  weakness, impaired endurance, impaired functional mobilty, gait instability, impaired balance, impaired cognition, decreased lower extremity function, pain, decreased ROM, edema, orthopedic precautions.  Pt seen day 1  s/p insertion of intramedullary dorian in R femur. Upon arrival pt is alert and agreeable to therapy after encouragement from PT. Pt stated that she typically does not use continuous O2 at home. After removing nasal canula, PT assessed pt's O2 sat levels. She immediately dropped to 84%. PT returned nasal canula to pt and re-assessed O2 saturation which was 92%. Pt exhibited increased anxiety throughout eval and treatment. PT provided education on deep breathing to assist with anxiety and decreased O2 sat levels. She required extra time to perform all thera ex, bed mobility, and transfers. Pt demonstrated approx. 20-degrees of R Knee flexion and decreased strength of R LE secondary to pain and anxiety upon movement. Pt performed 10-20 reps of thera ex in bed including ankle pumps, heel slides, SLRs with mod assist, gluteal squeezes and QS. She required mod A to perform all bed mobility (supine to sit) and transfers (sit to stand). Pt ambulated approx. 2ft with RW, mod A, 2L O2, assist from one other person for safety, and a chair following behind. During ambulated, pt demonstrated increased hesitancy to weightbear on R LE and performed transfer to chair with mod assist and extra encouragement from therapist. Pt would  benefit from rehab vs SNF to improve functional mobility.     Rehab Prognosis: Fair; patient would benefit from acute skilled PT services to address these deficits and reach maximum level of function.    Recent Surgery: Procedure(s) (LRB):  INSERTION, INTRAMEDULLARY JOHAN, FEMUR (Right) 1 Day Post-Op    Plan:     During this hospitalization, patient to be seen daily (1-2x/day) to address the identified rehab impairments via gait training, therapeutic activities, therapeutic exercises and progress toward the following goals:    · Plan of Care Expires:  06/30/22    Subjective   Pt states that prior to admission, she was independent. She reports that she was visiting a friend when she tripped and fell. Pt states that she was unaware of surgery performed on yesterday and thought surgery would take place today. She reports that son spent the night with her yesterday evening.   Chief Complaint: R LE pain and itching  Patient/Family Comments/goals: none stated   Pain/Comfort:  · Pain Rating 1:  (complained of R LE pain upon movement)    Patients cultural, spiritual, Bahai conflicts given the current situation:      Living Environment:  Pt lives alone in a one story home. She has 3-4 steps and bilateral railing to reach her front porch.   Prior to admission, patients level of function was independent with all ADLs.  Equipment used at home: none.  DME owned (not currently used): none.  Upon discharge, patient will have assistance from son and close friends.    Objective:     Communicated with nurse Ha prior to session.  Patient found HOB elevated with telemetry, oxygen, peripheral IV, SCD, gallardo catheter  upon PT entry to room.    General Precautions: Standard, fall   Orthopedic Precautions:RLE weight bearing as tolerated   Braces: N/A  Respiratory Status: Nasal cannula, flow 2 L/min    Exams:  · RLE ROM: approx. 20-degrees of R knee flexion secondayr to pain and increased anxiety upon movement   · RLE Strength: 3-/5    · LLE ROM: WFL  · LLE Strength: WFL    Functional Mobility:  · Bed Mobility:     · Rolling Left:  moderate assistance  · Supine to Sit: moderate assistance  · Transfers:     · Sit to Stand:  moderate assistance with rolling walker  · Gait: Pt ambulated approx. 2ft with RW, mod assist, 2L O2, assist from one other person for safety, and a  chair following behind. Required extra encouragement to initiate forward steps.     Therapeutic Activities and Exercises:   Pt performed 10-20 reps of thera ex while in bed including ankle pumps, heel slides, SLRs with mod assist, gluteal sets, and QS.   PT educated pt on the importance of deep breathing to improve O2 saturation levels and to decreased anxiety levels.     AM-PAC 6 CLICK MOBILITY  Total Score:11     Patient left up in chair with all lines intact, call button in reach and chair alarm on.    GOALS:   Multidisciplinary Problems     Physical Therapy Goals        Problem: Physical Therapy    Goal Priority Disciplines Outcome Goal Variances Interventions   Physical Therapy Goal     PT, PT/OT Ongoing, Progressing     Description: Goals to be met by: 22    Patient will increase functional independence with mobility by performin. Supine to sit with MInimal Assistance  2. Sit to stand transfer with Minimal Assistance  3. Bed to chair transfer with Minimal Assistance using Rolling Walker  4. Gait  x 50 feet with Minimal Assistance using Rolling Walker.   5. Lower extremity exercise program x20 reps                     History:     Past Medical History:   Diagnosis Date    Anxiety     Constipation     Coronary artery disease     Esophageal stricture     GERD (gastroesophageal reflux disease)     indefinite PPI    Hyperlipidemia     Hypertension     Mitral valve prolapse     ARLETH on CPAP     Osteoporosis        Past Surgical History:   Procedure Laterality Date    APPENDECTOMY      BREAST BIOPSY      BREAST CYST ASPIRATION      BREAST SURGERY  2010     Reduction    cardiovascular stress testing  09/19/2017    CHOLECYSTECTOMY  12/13/2017    Laparoscopic- Dr Lai    CORONARY STENT PLACEMENT  2007    echocardiography  09/19/2017    ESOPHAGOGASTRODUODENOSCOPY      TONSILLECTOMY      TOTAL REDUCTION MAMMOPLASTY      TUBAL LIGATION         Time Tracking:     PT Received On: 06/06/22  PT Start Time: 0930     PT Stop Time: 1027  PT Total Time (min): 57 min     Billable Minutes: Evaluation 10, Therapeutic Activity 30 and Therapeutic Exercise 17      06/06/2022

## 2022-06-07 PROBLEM — I26.99 ACUTE PULMONARY EMBOLISM: Status: ACTIVE | Noted: 2022-06-07

## 2022-06-07 PROBLEM — J96.01 ACUTE HYPOXEMIC RESPIRATORY FAILURE: Status: ACTIVE | Noted: 2022-06-07

## 2022-06-07 LAB
ALBUMIN SERPL BCP-MCNC: 2.7 G/DL (ref 3.5–5.2)
ALP SERPL-CCNC: 60 U/L (ref 55–135)
ALT SERPL W/O P-5'-P-CCNC: 22 U/L (ref 10–44)
ANION GAP SERPL CALC-SCNC: 9 MMOL/L (ref 8–16)
AORTIC ROOT ANNULUS: 2.63 CM
AORTIC VALVE CUSP SEPERATION: 1.65 CM
APTT BLDCRRT: 27.9 SEC (ref 21–32)
APTT BLDCRRT: 62 SEC (ref 21–32)
APTT BLDCRRT: 62.2 SEC (ref 21–32)
AST SERPL-CCNC: 42 U/L (ref 10–40)
AV INDEX (PROSTH): 0.89
AV MEAN GRADIENT: 7 MMHG
AV PEAK GRADIENT: 13 MMHG
AV VALVE AREA: 2.74 CM2
AV VELOCITY RATIO: 0.78
BASOPHILS # BLD AUTO: 0.04 K/UL (ref 0–0.2)
BASOPHILS NFR BLD: 0.4 % (ref 0–1.9)
BILIRUB SERPL-MCNC: 0.9 MG/DL (ref 0.1–1)
BLD PROD TYP BPU: NORMAL
BLD PROD TYP BPU: NORMAL
BLOOD UNIT EXPIRATION DATE: NORMAL
BLOOD UNIT EXPIRATION DATE: NORMAL
BLOOD UNIT TYPE CODE: 5100
BLOOD UNIT TYPE CODE: 5100
BLOOD UNIT TYPE: NORMAL
BLOOD UNIT TYPE: NORMAL
BSA FOR ECHO PROCEDURE: 1.84 M2
BUN SERPL-MCNC: 15 MG/DL (ref 8–23)
CALCIUM SERPL-MCNC: 8.8 MG/DL (ref 8.7–10.5)
CHLORIDE SERPL-SCNC: 99 MMOL/L (ref 95–110)
CO2 SERPL-SCNC: 27 MMOL/L (ref 23–29)
CODING SYSTEM: NORMAL
CODING SYSTEM: NORMAL
CREAT SERPL-MCNC: 0.8 MG/DL (ref 0.5–1.4)
CV ECHO LV RWT: 0.4 CM
DIFFERENTIAL METHOD: ABNORMAL
DISPENSE STATUS: NORMAL
DISPENSE STATUS: NORMAL
DOP CALC AO PEAK VEL: 1.81 M/S
DOP CALC AO VTI: 31.77 CM
DOP CALC LVOT AREA: 3.1 CM2
DOP CALC LVOT DIAMETER: 1.98 CM
DOP CALC LVOT PEAK VEL: 1.42 M/S
DOP CALC LVOT STROKE VOLUME: 86.91 CM3
DOP CALC MV VTI: 25.02 CM
DOP CALCLVOT PEAK VEL VTI: 28.24 CM
E WAVE DECELERATION TIME: 235.32 MSEC
E/A RATIO: 0.74
E/E' RATIO: 13.73 M/S
ECHO LV POSTERIOR WALL: 0.8 CM (ref 0.6–1.1)
EJECTION FRACTION: 70 %
EOSINOPHIL # BLD AUTO: 0.1 K/UL (ref 0–0.5)
EOSINOPHIL NFR BLD: 0.6 % (ref 0–8)
ERYTHROCYTE [DISTWIDTH] IN BLOOD BY AUTOMATED COUNT: 12.7 % (ref 11.5–14.5)
EST. GFR  (AFRICAN AMERICAN): >60 ML/MIN/1.73 M^2
EST. GFR  (NON AFRICAN AMERICAN): >60 ML/MIN/1.73 M^2
FRACTIONAL SHORTENING: 32 % (ref 28–44)
GLUCOSE SERPL-MCNC: 137 MG/DL (ref 70–110)
HCT VFR BLD AUTO: 22.6 % (ref 37–48.5)
HCT VFR BLD AUTO: 22.8 % (ref 37–48.5)
HGB BLD-MCNC: 7.5 G/DL (ref 12–16)
HGB BLD-MCNC: 7.8 G/DL (ref 12–16)
IMM GRANULOCYTES # BLD AUTO: 0.06 K/UL (ref 0–0.04)
IMM GRANULOCYTES NFR BLD AUTO: 0.5 % (ref 0–0.5)
INR PPP: 1 (ref 0.8–1.2)
INTERVENTRICULAR SEPTUM: 0.78 CM (ref 0.6–1.1)
IVRT: 51.38 MSEC
LA MAJOR: 4.4 CM
LA MINOR: 4.48 CM
LA WIDTH: 3.19 CM
LEFT ATRIUM SIZE: 4.05 CM
LEFT ATRIUM VOLUME INDEX MOD: 13.5 ML/M2
LEFT ATRIUM VOLUME INDEX: 27.7 ML/M2
LEFT ATRIUM VOLUME MOD: 23.83 CM3
LEFT ATRIUM VOLUME: 48.75 CM3
LEFT INTERNAL DIMENSION IN SYSTOLE: 2.68 CM (ref 2.1–4)
LEFT VENTRICLE DIASTOLIC VOLUME INDEX: 39.15 ML/M2
LEFT VENTRICLE DIASTOLIC VOLUME: 68.9 ML
LEFT VENTRICLE MASS INDEX: 52 G/M2
LEFT VENTRICLE SYSTOLIC VOLUME INDEX: 15.1 ML/M2
LEFT VENTRICLE SYSTOLIC VOLUME: 26.52 ML
LEFT VENTRICULAR INTERNAL DIMENSION IN DIASTOLE: 3.97 CM (ref 3.5–6)
LEFT VENTRICULAR MASS: 90.77 G
LV LATERAL E/E' RATIO: 11.44 M/S
LV SEPTAL E/E' RATIO: 17.17 M/S
LYMPHOCYTES # BLD AUTO: 1.7 K/UL (ref 1–4.8)
LYMPHOCYTES NFR BLD: 15.1 % (ref 18–48)
MAGNESIUM SERPL-MCNC: 2.2 MG/DL (ref 1.6–2.6)
MCH RBC QN AUTO: 31.2 PG (ref 27–31)
MCHC RBC AUTO-ENTMCNC: 34.5 G/DL (ref 32–36)
MCV RBC AUTO: 90 FL (ref 82–98)
MONOCYTES # BLD AUTO: 0.8 K/UL (ref 0.3–1)
MONOCYTES NFR BLD: 7.3 % (ref 4–15)
MV A" WAVE DURATION": 8.37 MSEC
MV MEAN GRADIENT: 1 MMHG
MV PEAK A VEL: 1.39 M/S
MV PEAK E VEL: 1.03 M/S
MV PEAK GRADIENT: 9 MMHG
MV STENOSIS PRESSURE HALF TIME: 68.24 MS
MV VALVE AREA BY CONTINUITY EQUATION: 3.47 CM2
MV VALVE AREA P 1/2 METHOD: 3.22 CM2
NEUTROPHILS # BLD AUTO: 8.4 K/UL (ref 1.8–7.7)
NEUTROPHILS NFR BLD: 76.1 % (ref 38–73)
NRBC BLD-RTO: 0 /100 WBC
NUM UNITS TRANS PACKED RBC: NORMAL
NUM UNITS TRANS PACKED RBC: NORMAL
PHOSPHATE SERPL-MCNC: 1.6 MG/DL (ref 2.7–4.5)
PISA MRMAX VEL: 0.05 M/S
PISA TR MAX VEL: 3.4 M/S
PLATELET # BLD AUTO: 186 K/UL (ref 150–450)
PMV BLD AUTO: 11.8 FL (ref 9.2–12.9)
POTASSIUM SERPL-SCNC: 4.1 MMOL/L (ref 3.5–5.1)
PROT SERPL-MCNC: 5.9 G/DL (ref 6–8.4)
PROTHROMBIN TIME: 10.3 SEC (ref 9–12.5)
PULM VEIN S/D RATIO: 1.18
PV PEAK D VEL: 0.45 M/S
PV PEAK S VEL: 0.53 M/S
PV PEAK VELOCITY: 1.1 CM/S
RA MAJOR: 4.43 CM
RA PRESSURE: 3 MMHG
RA WIDTH: 2.82 CM
RBC # BLD AUTO: 2.5 M/UL (ref 4–5.4)
RIGHT VENTRICULAR END-DIASTOLIC DIMENSION: 2.98 CM
RV TISSUE DOPPLER FREE WALL SYSTOLIC VELOCITY 1 (APICAL 4 CHAMBER VIEW): 15.19 CM/S
SODIUM SERPL-SCNC: 135 MMOL/L (ref 136–145)
TDI LATERAL: 0.09 M/S
TDI SEPTAL: 0.06 M/S
TDI: 0.08 M/S
TR MAX PG: 46 MMHG
TRICUSPID ANNULAR PLANE SYSTOLIC EXCURSION: 2.34 CM
TV REST PULMONARY ARTERY PRESSURE: 49 MMHG
WBC # BLD AUTO: 11.05 K/UL (ref 3.9–12.7)

## 2022-06-07 PROCEDURE — 25000003 PHARM REV CODE 250: Performed by: ORTHOPAEDIC SURGERY

## 2022-06-07 PROCEDURE — 85014 HEMATOCRIT: CPT

## 2022-06-07 PROCEDURE — 63600175 PHARM REV CODE 636 W HCPCS: Performed by: ORTHOPAEDIC SURGERY

## 2022-06-07 PROCEDURE — 86920 COMPATIBILITY TEST SPIN: CPT

## 2022-06-07 PROCEDURE — 99900035 HC TECH TIME PER 15 MIN (STAT)

## 2022-06-07 PROCEDURE — 85610 PROTHROMBIN TIME: CPT | Performed by: NURSE PRACTITIONER

## 2022-06-07 PROCEDURE — 85018 HEMOGLOBIN: CPT

## 2022-06-07 PROCEDURE — 63600175 PHARM REV CODE 636 W HCPCS: Performed by: NURSE PRACTITIONER

## 2022-06-07 PROCEDURE — 84100 ASSAY OF PHOSPHORUS: CPT | Performed by: ORTHOPAEDIC SURGERY

## 2022-06-07 PROCEDURE — 25000003 PHARM REV CODE 250: Performed by: NURSE PRACTITIONER

## 2022-06-07 PROCEDURE — 93010 EKG 12-LEAD: ICD-10-PCS | Mod: ,,, | Performed by: INTERNAL MEDICINE

## 2022-06-07 PROCEDURE — 99223 1ST HOSP IP/OBS HIGH 75: CPT | Mod: ,,, | Performed by: INTERNAL MEDICINE

## 2022-06-07 PROCEDURE — 27000221 HC OXYGEN, UP TO 24 HOURS

## 2022-06-07 PROCEDURE — 85730 THROMBOPLASTIN TIME PARTIAL: CPT | Performed by: INTERNAL MEDICINE

## 2022-06-07 PROCEDURE — P9016 RBC LEUKOCYTES REDUCED: HCPCS | Performed by: ORTHOPAEDIC SURGERY

## 2022-06-07 PROCEDURE — 93010 ELECTROCARDIOGRAM REPORT: CPT | Mod: ,,, | Performed by: INTERNAL MEDICINE

## 2022-06-07 PROCEDURE — 36430 TRANSFUSION BLD/BLD COMPNT: CPT

## 2022-06-07 PROCEDURE — 80053 COMPREHEN METABOLIC PANEL: CPT | Performed by: ORTHOPAEDIC SURGERY

## 2022-06-07 PROCEDURE — 83735 ASSAY OF MAGNESIUM: CPT | Performed by: ORTHOPAEDIC SURGERY

## 2022-06-07 PROCEDURE — 85730 THROMBOPLASTIN TIME PARTIAL: CPT | Mod: 91 | Performed by: NURSE PRACTITIONER

## 2022-06-07 PROCEDURE — 99223 PR INITIAL HOSPITAL CARE,LEVL III: ICD-10-PCS | Mod: ,,, | Performed by: INTERNAL MEDICINE

## 2022-06-07 PROCEDURE — 36415 COLL VENOUS BLD VENIPUNCTURE: CPT | Performed by: ORTHOPAEDIC SURGERY

## 2022-06-07 PROCEDURE — 94660 CPAP INITIATION&MGMT: CPT

## 2022-06-07 PROCEDURE — 12000002 HC ACUTE/MED SURGE SEMI-PRIVATE ROOM

## 2022-06-07 PROCEDURE — 94761 N-INVAS EAR/PLS OXIMETRY MLT: CPT

## 2022-06-07 PROCEDURE — 85025 COMPLETE CBC W/AUTO DIFF WBC: CPT | Performed by: ORTHOPAEDIC SURGERY

## 2022-06-07 PROCEDURE — 25500020 PHARM REV CODE 255: Performed by: INTERNAL MEDICINE

## 2022-06-07 PROCEDURE — 25000003 PHARM REV CODE 250: Performed by: INTERNAL MEDICINE

## 2022-06-07 PROCEDURE — 93005 ELECTROCARDIOGRAM TRACING: CPT

## 2022-06-07 RX ORDER — ALPRAZOLAM 0.25 MG/1
0.25 TABLET ORAL 2 TIMES DAILY PRN
Status: DISCONTINUED | OUTPATIENT
Start: 2022-06-07 | End: 2022-06-09 | Stop reason: HOSPADM

## 2022-06-07 RX ORDER — ESCITALOPRAM OXALATE 5 MG/1
5 TABLET ORAL DAILY
Status: DISCONTINUED | OUTPATIENT
Start: 2022-06-07 | End: 2022-06-08

## 2022-06-07 RX ORDER — HEPARIN SODIUM,PORCINE/D5W 25000/250
0-40 INTRAVENOUS SOLUTION INTRAVENOUS CONTINUOUS
Status: DISCONTINUED | OUTPATIENT
Start: 2022-06-07 | End: 2022-06-09

## 2022-06-07 RX ADMIN — THERA TABS 1 TABLET: TAB at 10:06

## 2022-06-07 RX ADMIN — METOPROLOL SUCCINATE 25 MG: 25 TABLET, EXTENDED RELEASE ORAL at 10:06

## 2022-06-07 RX ADMIN — METHOCARBAMOL TABLETS 750 MG: 750 TABLET, COATED ORAL at 10:06

## 2022-06-07 RX ADMIN — EZETIMIBE 10 MG: 10 TABLET ORAL at 10:06

## 2022-06-07 RX ADMIN — SODIUM CHLORIDE, SODIUM LACTATE, POTASSIUM CHLORIDE, AND CALCIUM CHLORIDE: .6; .31; .03; .02 INJECTION, SOLUTION INTRAVENOUS at 10:06

## 2022-06-07 RX ADMIN — Medication 1 TABLET: at 06:06

## 2022-06-07 RX ADMIN — IOHEXOL 100 ML: 350 INJECTION, SOLUTION INTRAVENOUS at 05:06

## 2022-06-07 RX ADMIN — HEPARIN SODIUM 18 UNITS/KG/HR: 1000 INJECTION, SOLUTION INTRAVENOUS; SUBCUTANEOUS at 07:06

## 2022-06-07 RX ADMIN — DOCUSATE SODIUM AND SENNOSIDES 1 TABLET: 8.6; 5 TABLET, FILM COATED ORAL at 10:06

## 2022-06-07 RX ADMIN — ESCITALOPRAM OXALATE 5 MG: 5 TABLET, FILM COATED ORAL at 07:06

## 2022-06-07 RX ADMIN — Medication 1 TABLET: at 07:06

## 2022-06-07 RX ADMIN — OXYCODONE AND ACETAMINOPHEN 1 TABLET: 10; 325 TABLET ORAL at 06:06

## 2022-06-07 RX ADMIN — PANTOPRAZOLE SODIUM 40 MG: 40 TABLET, DELAYED RELEASE ORAL at 10:06

## 2022-06-07 RX ADMIN — PRAVASTATIN SODIUM 80 MG: 40 TABLET ORAL at 10:06

## 2022-06-07 NOTE — ASSESSMENT & PLAN NOTE
Tachycardic with hypoxia night of 6/7/2022  CTA demonstrated evidence of PE in right upper lobe   Heparin infusion initiated  Echocardiogram ordered pending  Pulmonology consulted  Repeat appt 62 following heparin  Supplemental O2

## 2022-06-07 NOTE — PROGRESS NOTES
[facility] Hospital Medicine  Progress Note    Patient Name: Aimee Palumbo  MRN: [id number]  Patient Class: IP- Inpatient   Admission Date: 6/4/2022  Length of Stay: 3 days  Attending Physician: Tanner Chappell MD  Primary Care Provider: Amor Lamas Jr, MD        Subjective:     Principal Problem:Closed fracture of shaft of right femur        HPI:  Aimee Palumbo is a 71-year-old female with past medical history significant for coronary artery disease, GERD, hyperlipidemia, hypertension, obstructive sleep apnea on CPAP, and osteoporosis who presented to the emergency department today after a mechanical fall at home for further evaluation right lower extremity pain.  Pt states that she was at the home of a friend when she tripped and fell on the sidewalk.  ED evaluation was significant for a closed displaced right midshaft femur fracture.  Per report, the orthopedic surgeon on-call was contacted who states he will take her to surgery tomorrow morning.  Patient has no further complaints at this time.      Overview/Hospital Course:  Aimee Palumbo is a 71 year old  female who was admitted after a fall at home and found to have closed displaced right midshaft femur fracture. She was admitted to Hospital Medicine and followed closely. Ortho inserted an intermedullary dorian into the right femur 6/5/2022. Pain was managed per ortho. PT/OT were consulted for eval/Tx.       Interval History: Notes reviewed. Overnight patient became tachycardic with HR in 120s. Found to be hypoxic on RA with O2 sats in 60s. CTA ordered demonstrated evidence of PE in right upper lobe. Started on heparin drip. Patient seen resting in bed with no acute distress. She reports her right hip pain is well controlled at this time. She currently on 4L NC. She denies SOB or chest pain at this time. She denies any bloody stools, hematuria, nausea/vomiting. Plan to obtain echocardiogram. Pulmonary consulted. Repeat H&H  7.5/22/8. 1 unit of RBC ordered. Will continue to monitor changes in respiratory status. Will continue to follow orthopedic recs.     Review of Systems   Constitutional:  Negative for chills, fatigue and fever.   HENT:  Negative for congestion, postnasal drip, sneezing and sore throat.    Respiratory:  Positive for chest tightness. Negative for cough, shortness of breath and wheezing.    Cardiovascular:  Negative for chest pain and palpitations.   Gastrointestinal:  Negative for abdominal distention, abdominal pain, nausea and vomiting.   Endocrine: Negative for polydipsia and polyuria.   Genitourinary:  Negative for difficulty urinating, dysuria, frequency and hematuria.   Musculoskeletal:  Positive for arthralgias and myalgias. Negative for back pain.   Neurological:  Negative for dizziness, syncope and headaches.   Psychiatric/Behavioral:  Negative for agitation, behavioral problems and confusion.    Objective:     Vital Signs (Most Recent):  Temp: 96.5 °F (35.8 °C) (06/07/22 1535)  Pulse: 104 (06/07/22 1535)  Resp: 15 (06/07/22 1535)  BP: (!) 115/57 (06/07/22 1535)  SpO2: 98 % (06/07/22 1535)   Vital Signs (24h Range):  Temp:  [96.5 °F (35.8 °C)-98.9 °F (37.2 °C)] 96.5 °F (35.8 °C)  Pulse:  [] 104  Resp:  [15-18] 15  SpO2:  [92 %-98 %] 98 %  BP: (103-139)/(51-76) 115/57     Weight: 81.6 kg (180 lb)  Body mass index is 36.36 kg/m².    Intake/Output Summary (Last 24 hours) at 6/7/2022 1535  Last data filed at 6/7/2022 0617  Gross per 24 hour   Intake 2094.83 ml   Output 100 ml   Net 1994.83 ml      Physical Exam  Vitals and nursing note reviewed.   Constitutional:       General: She is not in acute distress.     Appearance: She is obese. She is not ill-appearing.   HENT:      Head: Normocephalic and atraumatic.      Right Ear: External ear normal.      Left Ear: External ear normal.      Nose: Nose normal. No congestion or rhinorrhea.      Mouth/Throat:      Mouth: Mucous membranes are moist.      Pharynx:  Oropharynx is clear. No oropharyngeal exudate.   Eyes:      Extraocular Movements: Extraocular movements intact.      Conjunctiva/sclera: Conjunctivae normal.      Pupils: Pupils are equal, round, and reactive to light.   Cardiovascular:      Rate and Rhythm: Normal rate and regular rhythm.      Pulses: Normal pulses.      Heart sounds: Normal heart sounds. No murmur heard.  Pulmonary:      Effort: Pulmonary effort is normal. No respiratory distress.      Breath sounds: Normal breath sounds. No wheezing or rales.      Comments: Currently on 4 L NC   Abdominal:      General: Abdomen is flat. Bowel sounds are normal. There is no distension.      Palpations: Abdomen is soft.      Tenderness: There is no abdominal tenderness.   Musculoskeletal:         General: Tenderness present. No deformity. Normal range of motion.      Comments: Right hip tenderness, surgical incisions dressings CDI   Skin:     Findings: Bruising present.      Comments: Bruising to left forearm from injury (improving)   Neurological:      General: No focal deficit present.      Mental Status: She is alert and oriented to person, place, and time. Mental status is at baseline.      Cranial Nerves: No cranial nerve deficit.      Sensory: No sensory deficit.   Psychiatric:         Mood and Affect: Mood normal.         Behavior: Behavior normal.       Significant Labs: All pertinent labs within the past 24 hours have been reviewed.  CBC:   Recent Labs   Lab 06/06/22  0416 06/07/22  0451 06/07/22  1342   WBC 10.95 11.05  --    HGB 8.5* 7.8* 7.5*   HCT 25.1* 22.6* 22.8*    186  --      CMP:   Recent Labs   Lab 06/06/22  0416 06/07/22  0451     136 135*   K 4.3  4.2 4.1     101 99   CO2 26  25 27   *  133* 137*   BUN 15  15 15   CREATININE 0.8  0.8 0.8   CALCIUM 8.5*  8.5* 8.8   PROT 5.8* 5.9*   ALBUMIN 2.9* 2.7*   BILITOT 0.7 0.9   ALKPHOS 52* 60   AST 38 42*   ALT 23 22   ANIONGAP 9  10 9   EGFRNONAA >60  >60 >60        Significant Imaging: I have reviewed all pertinent imaging results/findings within the past 24 hours.      Assessment/Plan:      * Closed fracture of shaft of right femur   orthopedic surgery for post op management  surg done 6/5/2022 am  Pain control per surg post op  Barber's traction per orthopedic recommendation  PT/OT consult following surgery  Rehab disposition pending  Continue to follow ortho recs      Acute pulmonary embolism  Tachycardic with hypoxia night of 6/7/2022  CTA demonstrated evidence of PE in right upper lobe   Heparin infusion initiated  Echocardiogram ordered pending  Pulmonology consulted  Repeat appt 62 following heparin  Supplemental O2        ARLETH on CPAP  Chronic; continue chronic CPAP      Obesity  Body mass index is 36.36 kg/m². Morbid obesity complicates all aspects of disease management from diagnostic modalities to treatment. Weight loss encouraged and health benefits explained to patient.          Major depression        Coronary artery disease  Patient with known CAD s/p stent placement, which is controlled Will continue Statin and monitor for S/Sx of angina/ACS. Continue to monitor on telemetry.   Hold chronic ASA until after surgery.          Gastroesophageal reflux disease with esophagitis  Chronic; stable; continue chronic PPI      Hyperlipidemia   Patient is chronically on statin.will continue for now. Monitor clinically. Last LDL was No results found for: LDLCALC         Hypertension  Chronic, controlled.  Latest blood pressure and vitals reviewed-   Temp:  [96.5 °F (35.8 °C)-98.9 °F (37.2 °C)]   Pulse:  []   Resp:  [15-18]   BP: (103-139)/(51-76)   SpO2:  [92 %-98 %] .   Home meds for hypertension were reviewed and noted below.   Hypertension Medications             metoprolol succinate (TOPROL-XL) 25 MG 24 hr tablet Take 1 tablet (25 mg total) by mouth 2 (two) times a day.    nitroGLYCERIN (NITROSTAT) 0.4 MG SL tablet Place 0.4 mg under the tongue every 5 (five)  minutes as needed for Chest pain.          While in the hospital, will manage blood pressure as follows; Continue home antihypertensive regimen    Will utilize p.r.n. blood pressure medication only if patient's blood pressure greater than  180/110 and she develops symptoms such as worsening chest pain or shortness of breath.        Osteoporosis  Chronic; continue daily calcium/vit d supplement        VTE Risk Mitigation (From admission, onward)         Ordered     heparin 25,000 units in dextrose 5% (100 units/ml) IV bolus from bag - ADDITIONAL PRN BOLUS - 60 units/kg  As needed (PRN)        Question:  Heparin Infusion Adjustment (DO NOT MODIFY ANSWER)  Answer:  \[email or web address]\[email or web address]\Images\Pharmacy\HeparinInfusions\heparin HIGH INTENSITY nomogram for [facility] [id number].pdf    06/07/22 0548     heparin 25,000 units in dextrose 5% (100 units/ml) IV bolus from bag - ADDITIONAL PRN BOLUS - 30 units/kg  As needed (PRN)        Question:  Heparin Infusion Adjustment (DO NOT MODIFY ANSWER)  Answer:  \[email or web address]\epic\Images\Pharmacy\HeparinInfusions\heparin HIGH INTENSITY nomogram for [facility] [id number].pdf    06/07/22 0548     heparin 25,000 units in dextrose 5% 250 mL (100 units/mL) infusion HIGH INTENSITY nomogram - [facility]  Continuous        Question Answer Comment   Heparin Infusion Adjustment (DO NOT MODIFY ANSWER) \\[email or web address]\[email or web address]\Images\Pharmacy\HeparinInfusions\heparin HIGH INTENSITY nomogram for [facility] [id number].pdf    Begin at (in units/kg/hr) 18        06/07/22 0548     IP VTE HIGH RISK PATIENT  Once         06/04/22 1645     Place sequential compression device  Until discontinued         06/04/22 1645     Reason for No Pharmacological VTE Prophylaxis  Once        Question:  Reasons:  Answer:  Risk of Bleeding    06/04/22 1645                Discharge Planning   MOR: 6/10/2022     Code Status: Full Code   Is the patient medically ready for discharge?:     Reason for patient still in hospital (select all that apply): Patient new problem,  Patient trending condition, Treatment and Pending disposition  Discharge Plan A: Home                  Sarwat Crocker PA-C  Department of Hospital Medicine   [facility]

## 2022-06-07 NOTE — SUBJECTIVE & OBJECTIVE
Interval History: Notes reviewed. Overnight patient became tachycardic with HR in 120s. Found to be hypoxic on RA with O2 sats in 60s. CTA ordered demonstrated evidence of PE in right upper lobe. Started on heparin drip. Patient seen resting in bed with no acute distress. She reports her right hip pain is well controlled at this time. She currently on 4L NC. She denies SOB or chest pain at this time. She denies any bloody stools, hematuria, nausea/vomiting. Plan to obtain echocardiogram. Pulmonary consulted. Repeat H&H 7.5/22/8. 1 unit of RBC ordered. Will continue to monitor changes in respiratory status. Will continue to follow orthopedic recs.     Review of Systems   Constitutional:  Negative for chills, fatigue and fever.   HENT:  Negative for congestion, postnasal drip, sneezing and sore throat.    Respiratory:  Positive for chest tightness. Negative for cough, shortness of breath and wheezing.    Cardiovascular:  Negative for chest pain and palpitations.   Gastrointestinal:  Negative for abdominal distention, abdominal pain, nausea and vomiting.   Endocrine: Negative for polydipsia and polyuria.   Genitourinary:  Negative for difficulty urinating, dysuria, frequency and hematuria.   Musculoskeletal:  Positive for arthralgias and myalgias. Negative for back pain.   Neurological:  Negative for dizziness, syncope and headaches.   Psychiatric/Behavioral:  Negative for agitation, behavioral problems and confusion.    Objective:     Vital Signs (Most Recent):  Temp: 96.5 °F (35.8 °C) (06/07/22 1535)  Pulse: 104 (06/07/22 1535)  Resp: 15 (06/07/22 1535)  BP: (!) 115/57 (06/07/22 1535)  SpO2: 98 % (06/07/22 1535)   Vital Signs (24h Range):  Temp:  [96.5 °F (35.8 °C)-98.9 °F (37.2 °C)] 96.5 °F (35.8 °C)  Pulse:  [] 104  Resp:  [15-18] 15  SpO2:  [92 %-98 %] 98 %  BP: (103-139)/(51-76) 115/57     Weight: 81.6 kg (180 lb)  Body mass index is 36.36 kg/m².    Intake/Output Summary (Last 24 hours) at 6/7/2022  1535  Last data filed at 6/7/2022 0617  Gross per 24 hour   Intake 2094.83 ml   Output 100 ml   Net 1994.83 ml      Physical Exam  Vitals and nursing note reviewed.   Constitutional:       General: She is not in acute distress.     Appearance: She is obese. She is not ill-appearing.   HENT:      Head: Normocephalic and atraumatic.      Right Ear: External ear normal.      Left Ear: External ear normal.      Nose: Nose normal. No congestion or rhinorrhea.      Mouth/Throat:      Mouth: Mucous membranes are moist.      Pharynx: Oropharynx is clear. No oropharyngeal exudate.   Eyes:      Extraocular Movements: Extraocular movements intact.      Conjunctiva/sclera: Conjunctivae normal.      Pupils: Pupils are equal, round, and reactive to light.   Cardiovascular:      Rate and Rhythm: Normal rate and regular rhythm.      Pulses: Normal pulses.      Heart sounds: Normal heart sounds. No murmur heard.  Pulmonary:      Effort: Pulmonary effort is normal. No respiratory distress.      Breath sounds: Normal breath sounds. No wheezing or rales.      Comments: Currently on 4 L NC   Abdominal:      General: Abdomen is flat. Bowel sounds are normal. There is no distension.      Palpations: Abdomen is soft.      Tenderness: There is no abdominal tenderness.   Musculoskeletal:         General: Tenderness present. No deformity. Normal range of motion.      Comments: Right hip tenderness, surgical incisions dressings CDI   Skin:     Findings: Bruising present.      Comments: Bruising to left forearm from injury (improving)   Neurological:      General: No focal deficit present.      Mental Status: She is alert and oriented to person, place, and time. Mental status is at baseline.      Cranial Nerves: No cranial nerve deficit.      Sensory: No sensory deficit.   Psychiatric:         Mood and Affect: Mood normal.         Behavior: Behavior normal.       Significant Labs: All pertinent labs within the past 24 hours have been  reviewed.  CBC:   Recent Labs   Lab 06/06/22  0416 06/07/22  0451 06/07/22  1342   WBC 10.95 11.05  --    HGB 8.5* 7.8* 7.5*   HCT 25.1* 22.6* 22.8*    186  --      CMP:   Recent Labs   Lab 06/06/22  0416 06/07/22  0451     136 135*   K 4.3  4.2 4.1     101 99   CO2 26  25 27   *  133* 137*   BUN 15  15 15   CREATININE 0.8  0.8 0.8   CALCIUM 8.5*  8.5* 8.8   PROT 5.8* 5.9*   ALBUMIN 2.9* 2.7*   BILITOT 0.7 0.9   ALKPHOS 52* 60   AST 38 42*   ALT 23 22   ANIONGAP 9  10 9   EGFRNONAA >60  >60 >60       Significant Imaging: I have reviewed all pertinent imaging results/findings within the past 24 hours.

## 2022-06-07 NOTE — CONSULTS
2022      Admit Date: 2022  Aimee Palumbo  New Patient Consult    Chief Complaint   Patient presents with    Fall     Trip and fall earlier today, right leg pain from hip to knee rated 10/10; + shortening and rotation        History of Present Illness:   Pt very active, mows grass, no resp issues nor prior pe. Tripped ppt right leg fx ppt admit with subsequent increase ox needs leading to ddimer and cta with lg clot burgen to my view.  Had had sign right leg pain post repair with swellign. No fh dvt/pe. No hormonal rx nor wt loss. No cancer.  Pt has had chr anxiety and depression.    left her with young son to be unger, another  had drug issue and 3rd  after many yrs marriage recently. She feels depressed.  She had no impressive acute sob with clots.  No h/o bleeding issues.  Had been on xanax in past at 1/2 mg - stopped as didn't wish to be on meds-- wishes to use now.          [facility] 2022    HPI: Aimee Palumbo is a 71-year-old female with past medical history significant for coronary artery disease, GERD, hyperlipidemia, hypertension, obstructive sleep apnea on CPAP, and osteoporosis who presented to the emergency department today after a mechanical fall at home for further evaluation right lower extremity pain.  Pt states that she was at the home of a friend when she tripped and fell on the sidewalk.  ED evaluation was significant for a closed displaced right midshaft femur fracture.  Per report, the orthopedic surgeon on-call was contacted who states he will take her to surgery tomorrow morning.  Patient has no further complaints at this time.  H/o osas on cpap    PFSH:  Past Medical History:   Diagnosis Date    Anxiety     Constipation     Coronary artery disease     Esophageal stricture     GERD (gastroesophageal reflux disease)     indefinite PPI    Hyperlipidemia     Hypertension     Mitral valve prolapse     ARLETH on CPAP     Osteoporosis   "    Past Surgical History:   Procedure Laterality Date    APPENDECTOMY      BREAST BIOPSY      BREAST CYST ASPIRATION      BREAST SURGERY  12/2010    Reduction    cardiovascular stress testing  09/19/2017    CHOLECYSTECTOMY  12/13/2017    [facility]- Dr Lai    CORONARY STENT PLACEMENT  2007    echocardiography  09/19/2017    ESOPHAGOGASTRODUODENOSCOPY      INTRAMEDULLARY RODDING OF FEMUR Right 6/5/2022    Procedure: INSERTION, INTRAMEDULLARY JOHAN, FEMUR;  Surgeon: Choco Cristobal MD;  Location: [facility];  Service: Orthopedics;  Laterality: Right;    TONSILLECTOMY      TOTAL REDUCTION MAMMOPLASTY      TUBAL LIGATION       Social History     Tobacco Use    Smoking status: Never Smoker    Smokeless tobacco: Never Used   Substance Use Topics    Alcohol use: Yes     Comment: occ    Drug use: No     Family History   Problem Relation Age of Onset    Breast cancer Mother     Hyperlipidemia Mother     Hypertension Mother     Lung cancer Father     Hyperlipidemia Father     Diabetes Brother     Hyperlipidemia Brother     Cancer Brother         lymphoma    Diabetes Maternal Uncle     Lung cancer Paternal Uncle     Stroke Maternal Grandmother      Review of patient's allergies indicates:  No Known Allergies    Performance Status:Performance Status:The patient's activity level is functions out of house.    Review of Systems:  a review of eleven systems covering constitutional, Psych, Eye, HEENT, Respiratory, Cardiac, GI, , Musculoskeletal, Endocrine, Dermatologicwas negative except the above mentioned abnormalities and for any pertinent findings as listed below:  pertinent positive as above, rest is good         Exam:Comprehensive exam done. BP (!) 120/55   Pulse 104   Temp 97.6 °F (36.4 °C)   Resp 16   Ht 4' 11" (1.499 m)   Wt 81.6 kg (180 lb)   SpO2 98%   BMI 36.36 kg/m²   Exam included Vitals as listed, and patient's appearance and affect and alertness and mood, oral exam for " yeast and hygiene and pharynx lesions and Mallapatti (M) score, neck with inspection for jvd and masses and thyroid abnormalities and lymph nodes (supraclavicular and infraclavicular nodes also examined and noted if abn), chest exam included symmetry and effort and fremitus and percussion and auscultation, cardiac exam included rhythm and gallops and murmur and rubs and jvd and edema, abdominal exam for mass and hepatosplenomegaly and tenderness and hernias and bowel sounds, Musculoskeletal exam with muscle tone and posture and mobility/gait and  strenght, and skin for rashes and cyanosis and pallor and turgor, extremity for clubbing.  Findings were normal except as listed below:  Alert, M3, chest is symmetric, no distress, normal percussion, normal fremitus and good normal breath sounds  Right leg swollen post op      Radiographs reviewed: view by direct vision    No results found for this or any previous visit.  ]    Labs     Recent Labs   Lab 06/07/22  0451   WBC 11.05   HGB 7.8*   HCT 22.6*        Recent Labs   Lab 06/07/22  0451   *   K 4.1   CL 99   CO2 27   BUN 15   CREATININE 0.8   *   CALCIUM 8.8   MG 2.2   PHOS 1.6*   AST 42*   ALT 22   ALKPHOS 60   BILITOT 0.9   PROT 5.9*   ALBUMIN 2.7*   INR 1.0   No results for input(s): PH, PCO2, PO2, HCO3 in the last 24 hours.  Microbiology Results (last 7 days)     ** No results found for the last 168 hours. **          Impression:  Active Hospital Problems    Diagnosis  POA    *Closed fracture of shaft of right femur [S72.301A]  Yes    ARLETH on CPAP [G47.33, Z99.89]  Not Applicable    Osteoporosis [M81.0]  Yes    Hypertension [I10]  Yes    Hyperlipidemia [E78.5]  Yes    Gastroesophageal reflux disease with esophagitis [K21.00]  Yes    Coronary artery disease [I25.10]  Yes    Obesity [E66.9]  Yes      Resolved Hospital Problems   No resolved problems to display.               Plan: no fever, vss but pulse 120 earlier.  Now 104,   Hep  drip, went from rm air to 2 to 4 lpm last 24 hrs.     hgb down ot 7.8 from 11.7 admit post femur fx.      cta rul pe and smaller left lung pes seen.     Needs 6 months + anticoagg-- consider provoiked clot.  Anxiety may hinder recovery-- suggest adding lexapro and prn xanax.  Needs ox for now.   Hep drip for another 24-48 hrs.

## 2022-06-07 NOTE — PLAN OF CARE
POC discussed with pt, pt verbalized understanding. Oriented x4. PIV cdi, infusing. NSR/tachy on tele. C/o pain to the right hip, relieved with prn's. Dressing to right hip cdi, changed.CPAP on while asleep with 4 liters o2. Pulses 2+. Pt up to the bedside commode with 2 person assist. Educated on the importance of the incentive spirometer use, coughing/deep breathing, and ambulation post op. Call light in reach, bed alarm set, safety maintained. No complaints or requests at this time, will continue to monitor.

## 2022-06-07 NOTE — NURSING
Pt here post dorian insertion for a fractured femur. Her heart rate shot into the 120's and 130's so I went to check and her o2 was in the low 60's and was very confused. She slept last night with a cpap and without oxygen without a drop in sats. 4 liters of 02 was put on along with the cpap and her sats have returned to the 90's and HR in the low 100's. Pulses 2+. She does not normally wear oxygen. States that she has chest pain when she breathes. Informed NP. CTA chest ordered and obtained.

## 2022-06-07 NOTE — PT/OT/SLP PROGRESS
Physical Therapy      Patient Name:  Aimee Palumbo   MRN:  [id number]    Patient not seen today secondary to Other (Comment) (patient on hold currently due to PE per nurse.). Will follow-up 6/8/22.

## 2022-06-07 NOTE — ASSESSMENT & PLAN NOTE
orthopedic surgery for post op management  surg done 6/5/2022 am  Pain control per surg post op  Barber's traction per orthopedic recommendation  PT/OT consult following surgery  Rehab disposition pending  Continue to follow ortho recs

## 2022-06-07 NOTE — ASSESSMENT & PLAN NOTE
Chronic, controlled.  Latest blood pressure and vitals reviewed-   Temp:  [96.5 °F (35.8 °C)-98.9 °F (37.2 °C)]   Pulse:  []   Resp:  [15-18]   BP: (103-139)/(51-76)   SpO2:  [92 %-98 %] .   Home meds for hypertension were reviewed and noted below.   Hypertension Medications             metoprolol succinate (TOPROL-XL) 25 MG 24 hr tablet Take 1 tablet (25 mg total) by mouth 2 (two) times a day.    nitroGLYCERIN (NITROSTAT) 0.4 MG SL tablet Place 0.4 mg under the tongue every 5 (five) minutes as needed for Chest pain.          While in the hospital, will manage blood pressure as follows; Continue home antihypertensive regimen    Will utilize p.r.n. blood pressure medication only if patient's blood pressure greater than  180/110 and she develops symptoms such as worsening chest pain or shortness of breath.

## 2022-06-07 NOTE — NURSING
APTT drawn, bolus infused. Continuous Heparin started at 18 units/kg/hr. APTT ordered to be redrawn in six hours at 1245.

## 2022-06-08 LAB
APTT BLDCRRT: 36.2 SEC (ref 21–32)
APTT BLDCRRT: 48.1 SEC (ref 21–32)
APTT BLDCRRT: 63.2 SEC (ref 21–32)
BASOPHILS # BLD AUTO: 0.04 K/UL (ref 0–0.2)
BASOPHILS NFR BLD: 0.4 % (ref 0–1.9)
DIFFERENTIAL METHOD: ABNORMAL
EOSINOPHIL # BLD AUTO: 0.1 K/UL (ref 0–0.5)
EOSINOPHIL NFR BLD: 1 % (ref 0–8)
ERYTHROCYTE [DISTWIDTH] IN BLOOD BY AUTOMATED COUNT: 13.2 % (ref 11.5–14.5)
HCT VFR BLD AUTO: 25.3 % (ref 37–48.5)
HGB BLD-MCNC: 8.4 G/DL (ref 12–16)
IMM GRANULOCYTES # BLD AUTO: 0.07 K/UL (ref 0–0.04)
IMM GRANULOCYTES NFR BLD AUTO: 0.7 % (ref 0–0.5)
LYMPHOCYTES # BLD AUTO: 1.9 K/UL (ref 1–4.8)
LYMPHOCYTES NFR BLD: 19.3 % (ref 18–48)
MCH RBC QN AUTO: 31.1 PG (ref 27–31)
MCHC RBC AUTO-ENTMCNC: 33.2 G/DL (ref 32–36)
MCV RBC AUTO: 94 FL (ref 82–98)
MONOCYTES # BLD AUTO: 0.9 K/UL (ref 0.3–1)
MONOCYTES NFR BLD: 8.8 % (ref 4–15)
NEUTROPHILS # BLD AUTO: 6.8 K/UL (ref 1.8–7.7)
NEUTROPHILS NFR BLD: 69.8 % (ref 38–73)
NRBC BLD-RTO: 0 /100 WBC
PLATELET # BLD AUTO: 165 K/UL (ref 150–450)
PMV BLD AUTO: 11.4 FL (ref 9.2–12.9)
RBC # BLD AUTO: 2.7 M/UL (ref 4–5.4)
WBC # BLD AUTO: 9.68 K/UL (ref 3.9–12.7)

## 2022-06-08 PROCEDURE — 85730 THROMBOPLASTIN TIME PARTIAL: CPT | Mod: 91 | Performed by: NURSE PRACTITIONER

## 2022-06-08 PROCEDURE — 94799 UNLISTED PULMONARY SVC/PX: CPT

## 2022-06-08 PROCEDURE — 97116 GAIT TRAINING THERAPY: CPT | Mod: CQ

## 2022-06-08 PROCEDURE — 63600175 PHARM REV CODE 636 W HCPCS: Performed by: ORTHOPAEDIC SURGERY

## 2022-06-08 PROCEDURE — 99233 PR SUBSEQUENT HOSPITAL CARE,LEVL III: ICD-10-PCS | Mod: ,,, | Performed by: INTERNAL MEDICINE

## 2022-06-08 PROCEDURE — 25000003 PHARM REV CODE 250: Performed by: ORTHOPAEDIC SURGERY

## 2022-06-08 PROCEDURE — 27000221 HC OXYGEN, UP TO 24 HOURS

## 2022-06-08 PROCEDURE — 63600175 PHARM REV CODE 636 W HCPCS: Performed by: NURSE PRACTITIONER

## 2022-06-08 PROCEDURE — 25000003 PHARM REV CODE 250: Performed by: INTERNAL MEDICINE

## 2022-06-08 PROCEDURE — 36415 COLL VENOUS BLD VENIPUNCTURE: CPT | Performed by: NURSE PRACTITIONER

## 2022-06-08 PROCEDURE — 12000002 HC ACUTE/MED SURGE SEMI-PRIVATE ROOM

## 2022-06-08 PROCEDURE — 94761 N-INVAS EAR/PLS OXIMETRY MLT: CPT

## 2022-06-08 PROCEDURE — 25000003 PHARM REV CODE 250: Performed by: NURSE PRACTITIONER

## 2022-06-08 PROCEDURE — 99900035 HC TECH TIME PER 15 MIN (STAT)

## 2022-06-08 PROCEDURE — 25000003 PHARM REV CODE 250

## 2022-06-08 PROCEDURE — 85730 THROMBOPLASTIN TIME PARTIAL: CPT | Mod: 91 | Performed by: INTERNAL MEDICINE

## 2022-06-08 PROCEDURE — 97530 THERAPEUTIC ACTIVITIES: CPT | Mod: CQ

## 2022-06-08 PROCEDURE — 85025 COMPLETE CBC W/AUTO DIFF WBC: CPT | Performed by: NURSE PRACTITIONER

## 2022-06-08 PROCEDURE — 94660 CPAP INITIATION&MGMT: CPT

## 2022-06-08 PROCEDURE — 99233 SBSQ HOSP IP/OBS HIGH 50: CPT | Mod: ,,, | Performed by: INTERNAL MEDICINE

## 2022-06-08 PROCEDURE — 85730 THROMBOPLASTIN TIME PARTIAL: CPT | Performed by: INTERNAL MEDICINE

## 2022-06-08 RX ORDER — BISACODYL 10 MG
10 SUPPOSITORY, RECTAL RECTAL DAILY PRN
Status: DISCONTINUED | OUTPATIENT
Start: 2022-06-08 | End: 2022-06-09 | Stop reason: HOSPADM

## 2022-06-08 RX ORDER — ESCITALOPRAM OXALATE 10 MG/1
10 TABLET ORAL DAILY
Status: DISCONTINUED | OUTPATIENT
Start: 2022-06-08 | End: 2022-06-09 | Stop reason: HOSPADM

## 2022-06-08 RX ORDER — POLYETHYLENE GLYCOL 3350 17 G/17G
17 POWDER, FOR SOLUTION ORAL DAILY
Status: DISCONTINUED | OUTPATIENT
Start: 2022-06-08 | End: 2022-06-08

## 2022-06-08 RX ORDER — POLYETHYLENE GLYCOL 3350 17 G/17G
17 POWDER, FOR SOLUTION ORAL 2 TIMES DAILY
Status: DISCONTINUED | OUTPATIENT
Start: 2022-06-08 | End: 2022-06-09 | Stop reason: HOSPADM

## 2022-06-08 RX ADMIN — HEPARIN SODIUM 18 UNITS/KG/HR: 1000 INJECTION, SOLUTION INTRAVENOUS; SUBCUTANEOUS at 02:06

## 2022-06-08 RX ADMIN — OXYCODONE AND ACETAMINOPHEN 1 TABLET: 10; 325 TABLET ORAL at 06:06

## 2022-06-08 RX ADMIN — PANTOPRAZOLE SODIUM 40 MG: 40 TABLET, DELAYED RELEASE ORAL at 08:06

## 2022-06-08 RX ADMIN — METHOCARBAMOL TABLETS 750 MG: 750 TABLET, COATED ORAL at 01:06

## 2022-06-08 RX ADMIN — METOPROLOL SUCCINATE 25 MG: 25 TABLET, EXTENDED RELEASE ORAL at 08:06

## 2022-06-08 RX ADMIN — DOCUSATE SODIUM AND SENNOSIDES 1 TABLET: 8.6; 5 TABLET, FILM COATED ORAL at 08:06

## 2022-06-08 RX ADMIN — ESCITALOPRAM OXALATE 10 MG: 10 TABLET, FILM COATED ORAL at 08:06

## 2022-06-08 RX ADMIN — METHOCARBAMOL TABLETS 750 MG: 750 TABLET, COATED ORAL at 05:06

## 2022-06-08 RX ADMIN — SODIUM CHLORIDE, SODIUM LACTATE, POTASSIUM CHLORIDE, AND CALCIUM CHLORIDE: .6; .31; .03; .02 INJECTION, SOLUTION INTRAVENOUS at 02:06

## 2022-06-08 RX ADMIN — HEPARIN SODIUM 20 UNITS/KG/HR: 1000 INJECTION, SOLUTION INTRAVENOUS; SUBCUTANEOUS at 08:06

## 2022-06-08 RX ADMIN — ALPRAZOLAM 0.25 MG: 0.25 TABLET ORAL at 05:06

## 2022-06-08 RX ADMIN — Medication 1 TABLET: at 08:06

## 2022-06-08 RX ADMIN — Medication 1 TABLET: at 04:06

## 2022-06-08 RX ADMIN — BISACODYL 10 MG: 10 SUPPOSITORY RECTAL at 06:06

## 2022-06-08 RX ADMIN — POLYETHYLENE GLYCOL 3350 17 G: 17 POWDER, FOR SOLUTION ORAL at 08:06

## 2022-06-08 RX ADMIN — PRAVASTATIN SODIUM 80 MG: 40 TABLET ORAL at 08:06

## 2022-06-08 RX ADMIN — METHOCARBAMOL TABLETS 750 MG: 750 TABLET, COATED ORAL at 08:06

## 2022-06-08 RX ADMIN — MORPHINE SULFATE 2 MG: 2 INJECTION, SOLUTION INTRAMUSCULAR; INTRAVENOUS at 04:06

## 2022-06-08 RX ADMIN — EZETIMIBE 10 MG: 10 TABLET ORAL at 08:06

## 2022-06-08 RX ADMIN — THERA TABS 1 TABLET: TAB at 08:06

## 2022-06-08 RX ADMIN — Medication 6 MG: at 08:06

## 2022-06-08 NOTE — ASSESSMENT & PLAN NOTE
Patient with Hypoxic Respiratory failure which is Acute.  she is on home oxygen at 4 LPM. Supplemental oxygen was provided and noted- Oxygen Concentration (%):  [36] 36.   Signs/symptoms of respiratory failure include- respiratory distress. Contributing diagnoses includes - Obesity Hypoventilation and Pulmonary Embolus Labs and images were reviewed. Patient Has not had a recent ABG. Will treat underlying causes and adjust management of respiratory failure as follows- supplemental O2, pulm consulted, Heparin infusion for acute PE

## 2022-06-08 NOTE — PLAN OF CARE
Report received from charge RN this late afternoon, reviewed patient POC, patient understood. Patient pain managed with pain medicine. Patient is c/o constipation, requested stool softeners. Patient has a heparin drip at rate 20units/kg/hr, next PTT @ 8:00pm, ordered already. Checked on patient QH for pain, potty, positioning, safety maintained at all times, call light in reach, skid free sock maintained.

## 2022-06-08 NOTE — CARE UPDATE
06/08/22 0750   Patient Assessment/Suction   Level of Consciousness (AVPU) alert   Respiratory Effort Normal   Expansion/Accessory Muscles/Retractions expansion symmetric   All Lung Fields Breath Sounds Anterior:   LLL Breath Sounds diminished   RLL Breath Sounds diminished   Rhythm/Pattern, Respiratory pattern regular   PRE-TX-O2   O2 Device (Oxygen Therapy) nasal cannula   $ Is the patient on Low Flow Oxygen? Yes   Flow (L/min) 4   Oxygen Concentration (%) 36   SpO2 97 %   Pulse Oximetry Type Intermittent   $ Pulse Oximetry - Multiple Charge Pulse Oximetry - Multiple   Probe Placed On (Pulse Ox) finger   Pulse 95   Resp 18   Positioning HOB elevated 30 degrees   Aerosol Therapy   $ Aerosol Therapy Charges PRN treatment not required   Skin Integrity   $ Wound Care Tech Time 15 min   Area Observed Bridge of nose;Cheek   Skin Appearance without discoloration   Barrier used? Liquid Filled Cushion   Preset CPAP/BiPAP Settings   Mode Of Delivery Standby

## 2022-06-08 NOTE — PLAN OF CARE
POC discussed with pt, pt verbalized understanding. Oriented x4. PIV cdi, infusing. NSR/tachy on tele. C/o pain to the right hip, relieved with prn's. Dressing to right hip cdi.CPAP on while asleep with 4 liters o2. Pulses 2+. Purewick on, yellow urine draining. Educated on the importance of the incentive spirometer use, coughing/deep breathing post op. Heparin drip infusing. 1 unit of RBC's infused. Call light in reach, bed alarm set, safety maintained. No complaints or requests at this time, will continue to monitor.

## 2022-06-08 NOTE — PT/OT/SLP PROGRESS
Physical Therapy      Patient Name:  Aimee Palumbo   MRN:  [id number]    Patient not seen today secondary to Other (Comment) (P.T. held nurse messaged  , awaiting US to r/o DVT to be cleared.). Will follow-up later.

## 2022-06-08 NOTE — PT/OT/SLP PROGRESS
Physical Therapy Treatment    Patient Name:  Aimee Palumbo   MRN:  [id number]    Recommendations:     Discharge Recommendations:  rehabilitation facility, nursing facility, skilled   Discharge Equipment Recommendations: walker, rolling   Barriers to discharge: None    Assessment:     Aimee Palumbo is a 71 y.o. female admitted with a medical diagnosis of Closed fracture of shaft of right femur.  She presents with the following impairments/functional limitations:  weakness, impaired endurance, impaired self care skills, impaired functional mobilty, decreased lower extremity function, decreased ROM, edema, orthopedic precautions, pain . Cleared by nurse Hou as US report , negative.  Awake, alert, eager to ambulate and use BSC. Transferred EOB with Min A. Stood with rw and Mod A, ambulated 15' with rw and Min A, verbal cues for technique. IV and O2 in tow. SpO2 @ 100%. Reported slight dizziness following, BP assessed 137/64, SpO2 @ 99%. Sat briefly, step transfer chair to BSC, Assistance required for hygiene post void, unable to have BM. Step transfer BSC > chair. Stand to sit with Min A and verbal cues.      Rehab Prognosis: Fair /good patient would benefit from acute skilled PT services to address these deficits and reach maximum level of function.    Recent Surgery: Procedure(s) (LRB):  INSERTION, INTRAMEDULLARY JOHAN, FEMUR (Right) 3 Days Post-Op    Plan:     During this hospitalization, patient to be seen daily (1-2x per day) to address the identified rehab impairments via gait training, therapeutic activities, therapeutic exercises and progress toward the following goals:    · Plan of Care Expires:  06/30/22    Subjective     Chief Complaint: pain R thigh  Patient/Family Comments/goals: to go to rehab  Pain/Comfort:  · Pain Rating 1: other (see comments) (did not rate)  · Location - Side 1: Right  · Location - Orientation 1: posterior  · Location 1: thigh  · Pain Addressed 1: Reposition, Nurse  notified      Objective:     Communicated with nurse Savi Medley prior to session.  Patient found supine with bed alarm, oxygen, PureWick, peripheral IV, telemetry, SCD upon PT entry to room.     General Precautions: Standard, fall   Orthopedic Precautions:RLE weight bearing as tolerated   Braces: N/A  Respiratory Status: Nasal cannula, flow 4 L/min     Functional Mobility:  · Bed Mobility:     · Rolling Left:  minimum assistance  · Supine to Sit: minimum assistance  · Transfers:     · Sit to Stand:  moderate assistance with rolling walker  · Gait: 15' with rw and Min A, WBAT RLE      AM-PAC 6 CLICK MOBILITY          Therapeutic Activities and Exercises:   Transferred EOB with Min A.   Stood with rw and Mod A.   Ambulated 15' with rw and Min A, WBAT RLE with IV and O2 attached.    Chair <>BSC with rw , Min A via step transfer. Assistance required for hygiene post void.           Patient left up in chair with all lines intact, call button in reach, chair alarm on and nurse COLTON Medley notified..    GOALS:   Multidisciplinary Problems     Physical Therapy Goals        Problem: Physical Therapy    Goal Priority Disciplines Outcome Goal Variances Interventions   Physical Therapy Goal     PT, PT/OT Ongoing, Progressing     Description: Goals to be met by: 22    Patient will increase functional independence with mobility by performin. Supine to sit with MInimal Assistance  2. Sit to stand transfer with Minimal Assistance  3. Bed to chair transfer with Minimal Assistance using Rolling Walker  4. Gait  x 50 feet with Minimal Assistance using Rolling Walker.   5. Lower extremity exercise program x20 reps                     Time Tracking:     PT Received On: 22  PT Start Time: 1414     PT Stop Time: 1440  PT Total Time (min): 26 min     Billable Minutes: Gait Training 10min and Therapeutic Activity 16min    Treatment Type: Treatment  PT/PTA: PTA     PTA Visit Number: 1     2022

## 2022-06-08 NOTE — ASSESSMENT & PLAN NOTE
orthopedic surgery for post op management  surg done 6/5/2022 am  Pain control per surg post op  Barber's traction per orthopedic recommendation  PT/OT consult following surgery  Discharge disposition: Acute rehab  Continue to follow ortho recs

## 2022-06-08 NOTE — NURSING
Pt aptt 62.0, within therapeutic range. Remains at 18 units/kg/hr. That was two consecutive therapeutic readings, so next draw will be in the am.

## 2022-06-08 NOTE — PT/OT/SLP PROGRESS
Occupational Therapy      Patient Name:  Aimee Palumbo   MRN:  [id number]    Patient not seen today secondary to Nurse/ HUSSEIN hold (pt with PE and on hold for mobility until Ultra sound results of BLE.). Will follow-up ASAP.    6/8/2022

## 2022-06-08 NOTE — NURSING
Pt has USN of bilateral lower extremities ordered. Per PAYAL Cisneros if the result is negative pt can work with PT. Vianca from PT aware, she verbalized understanding.

## 2022-06-08 NOTE — PROGRESS NOTES
[facility] Hospital Medicine  Progress Note    Patient Name: Aimee Palumbo  MRN: [id number]  Patient Class: IP- Inpatient   Admission Date: 6/4/2022  Length of Stay: 4 days  Attending Physician: Tanner Chappell MD  Primary Care Provider: Amor Lamas Jr, MD        Subjective:     Principal Problem:Closed fracture of shaft of right femur        HPI:  Aimee Palumbo is a 71-year-old female with past medical history significant for coronary artery disease, GERD, hyperlipidemia, hypertension, obstructive sleep apnea on CPAP, and osteoporosis who presented to the emergency department today after a mechanical fall at home for further evaluation right lower extremity pain.  Pt states that she was at the home of a friend when she tripped and fell on the sidewalk.  ED evaluation was significant for a closed displaced right midshaft femur fracture.  Per report, the orthopedic surgeon on-call was contacted who states he will take her to surgery tomorrow morning.  Patient has no further complaints at this time.      Overview/Hospital Course:  Aimee Palumbo is a 71 year old  female who was admitted after a fall at home and found to have closed displaced right midshaft femur fracture. She was admitted to Hospital Medicine and followed closely. Ortho inserted an intermedullary dorian into the right femur 6/5/2022. Pain was managed per ortho. PT/OT were consulted for eval/Tx.       Interval History: Notes reviewed, no acute events overnight. Patient seen this morning resting in bed with no acute distress. Patient remains on heparin drip this morning. US of BLE negative for DVT. H&H stable today following 1 unit of RBC. Plan to mobilize patient today. Started on lexapro and xanax prn for anxiety per pulmonology.  Patient accepted to Acute rehab pending discharge. Will continue to monitor changes in respiratory status and follow pulmonology recs.     Review of Systems   Constitutional:  Negative for  chills, fatigue and fever.   HENT:  Negative for congestion, rhinorrhea, sore throat and trouble swallowing.    Respiratory:  Negative for cough and shortness of breath.    Cardiovascular:  Negative for chest pain and palpitations.   Gastrointestinal:  Positive for constipation. Negative for abdominal distention, abdominal pain, nausea and vomiting.   Endocrine: Negative for polydipsia and polyuria.   Genitourinary:  Negative for difficulty urinating, dysuria and hematuria.   Musculoskeletal:  Positive for arthralgias. Negative for myalgias.   Neurological:  Negative for dizziness, syncope and headaches.   Hematological:  Negative for adenopathy.   Psychiatric/Behavioral:  Negative for agitation, behavioral problems and confusion.    Objective:     Vital Signs (Most Recent):  Temp: 97.2 °F (36.2 °C) (06/08/22 0805)  Pulse: 99 (06/08/22 0805)  Resp: 16 (06/08/22 0805)  BP: (!) 106/52 (06/08/22 0805)  SpO2: 97 % (06/08/22 0805)   Vital Signs (24h Range):  Temp:  [96.5 °F (35.8 °C)-98.9 °F (37.2 °C)] 97.2 °F (36.2 °C)  Pulse:  [] 99  Resp:  [14-18] 16  SpO2:  [95 %-98 %] 97 %  BP: (106-147)/(52-67) 106/52     Weight: 81.6 kg (180 lb)  Body mass index is 36.36 kg/m².    Intake/Output Summary (Last 24 hours) at 6/8/2022 1125  Last data filed at 6/8/2022 0640  Gross per 24 hour   Intake 1664.13 ml   Output 1000 ml   Net 664.13 ml      Physical Exam  Vitals and nursing note reviewed.   Constitutional:       General: She is not in acute distress.     Appearance: She is obese. She is not ill-appearing.   HENT:      Head: Normocephalic and atraumatic.      Right Ear: External ear normal.      Left Ear: External ear normal.      Nose: Nose normal. No congestion.      Mouth/Throat:      Mouth: Mucous membranes are moist.      Pharynx: Oropharynx is clear. No oropharyngeal exudate.   Eyes:      Extraocular Movements: Extraocular movements intact.      Conjunctiva/sclera: Conjunctivae normal.      Pupils: Pupils are equal,  round, and reactive to light.   Cardiovascular:      Rate and Rhythm: Normal rate and regular rhythm.      Pulses: Normal pulses.      Heart sounds: Normal heart sounds. No murmur heard.    No gallop.   Pulmonary:      Effort: Pulmonary effort is normal. No respiratory distress.      Breath sounds: Normal breath sounds. No wheezing or rales.      Comments: Currently on 4 L NC  Abdominal:      General: Abdomen is flat. There is no distension.      Palpations: Abdomen is soft.      Tenderness: There is no abdominal tenderness.   Musculoskeletal:         General: Tenderness present. No deformity. Normal range of motion.   Skin:     General: Skin is warm.      Findings: Bruising present.      Comments: Bruising to left forearm improving   Neurological:      General: No focal deficit present.      Mental Status: She is alert and oriented to person, place, and time. Mental status is at baseline.      Cranial Nerves: No cranial nerve deficit.      Sensory: No sensory deficit.   Psychiatric:         Mood and Affect: Mood normal.         Behavior: Behavior normal.       Significant Labs: All pertinent labs within the past 24 hours have been reviewed.  CBC:   Recent Labs   Lab 06/07/22  0451 06/07/22  1342 06/08/22  0510   WBC 11.05  --  9.68   HGB 7.8* 7.5* 8.4*   HCT 22.6* 22.8* 25.3*     --  165     CMP:   Recent Labs   Lab 06/07/22  0451   *   K 4.1   CL 99   CO2 27   *   BUN 15   CREATININE 0.8   CALCIUM 8.8   PROT 5.9*   ALBUMIN 2.7*   BILITOT 0.9   ALKPHOS 60   AST 42*   ALT 22   ANIONGAP 9   EGFRNONAA >60       Significant Imaging: I have reviewed all pertinent imaging results/findings within the past 24 hours.      Assessment/Plan:      * Closed fracture of shaft of right femur  orthopedic surgery for post op management  surg done 6/5/2022 am  Pain control per surg post op  Barber's traction per orthopedic recommendation  PT/OT consult following surgery  Discharge disposition: Acute rehab  Continue  to follow ortho recs      Acute hypoxemic respiratory failure  Patient with Hypoxic Respiratory failure which is Acute.  she is on home oxygen at 4 LPM. Supplemental oxygen was provided and noted- Oxygen Concentration (%):  [36] 36.   Signs/symptoms of respiratory failure include- respiratory distress. Contributing diagnoses includes - Obesity Hypoventilation and Pulmonary Embolus Labs and images were reviewed. Patient Has not had a recent ABG. Will treat underlying causes and adjust management of respiratory failure as follows- supplemental O2, pulm consulted, Heparin infusion for acute PE    Acute pulmonary embolism  Tachycardic with hypoxia night of 6/7/2022  CTA demonstrated evidence of PE in right upper lobe   Heparin infusion initiated  Echocardiogram ordered pending  Pulmonology consulted  Repeat appt 62 following heparin  Supplemental O2        ARLETH on CPAP  Chronic; continue chronic CPAP      Obesity  Body mass index is 36.36 kg/m². Morbid obesity complicates all aspects of disease management from diagnostic modalities to treatment. Weight loss encouraged and health benefits explained to patient.          Depression  Started on lexapro and prn xanax for anxiety   Will continue to monitor       Anxiety  Started on lexapro and prn xanax  Continue to monitor for changes      Coronary artery disease  Patient with known CAD s/p stent placement, which is controlled Will continue Statin and monitor for S/Sx of angina/ACS. Continue to monitor on telemetry.   Hold chronic ASA until after surgery.          Gastroesophageal reflux disease with esophagitis  Chronic; stable; continue chronic PPI      Hyperlipidemia   Patient is chronically on statin.will continue for now. Monitor clinically. Last LDL was No results found for: LDLCALC         Hypertension  Chronic, controlled.  Latest blood pressure and vitals reviewed-   Temp:  [96.5 °F (35.8 °C)-98.9 °F (37.2 °C)]   Pulse:  []   Resp:  [14-18]   BP:  (106-147)/(51-67)   SpO2:  [95 %-100 %] .   Home meds for hypertension were reviewed and noted below.   Hypertension Medications             metoprolol succinate (TOPROL-XL) 25 MG 24 hr tablet Take 1 tablet (25 mg total) by mouth 2 (two) times a day.    nitroGLYCERIN (NITROSTAT) 0.4 MG SL tablet Place 0.4 mg under the tongue every 5 (five) minutes as needed for Chest pain.          While in the hospital, will manage blood pressure as follows; Continue home antihypertensive regimen    Will utilize p.r.n. blood pressure medication only if patient's blood pressure greater than  180/110 and she develops symptoms such as worsening chest pain or shortness of breath.        Osteoporosis  Chronic; continue daily calcium/vit d supplement        VTE Risk Mitigation (From admission, onward)         Ordered     heparin 25,000 units in dextrose 5% (100 units/ml) IV bolus from bag - ADDITIONAL PRN BOLUS - 60 units/kg  As needed (PRN)        Question:  Heparin Infusion Adjustment (DO NOT MODIFY ANSWER)  Answer:  \\[email or web address]\epic\Images\Pharmacy\HeparinInfusions\heparin HIGH INTENSITY nomogram for [facility] [id number].pdf    06/07/22 0548     heparin 25,000 units in dextrose 5% (100 units/ml) IV bolus from bag - ADDITIONAL PRN BOLUS - 30 units/kg  As needed (PRN)        Question:  Heparin Infusion Adjustment (DO NOT MODIFY ANSWER)  Answer:  \\[facility]\epic\Images\Pharmacy\HeparinInfusions\heparin HIGH INTENSITY nomogram for [facility] [id number].pdf    06/07/22 0548     heparin 25,000 units in dextrose 5% 250 mL (100 units/mL) infusion HIGH INTENSITY nomogram - [facility]  Continuous        Question Answer Comment   Heparin Infusion Adjustment (DO NOT MODIFY ANSWER) \\[email or web address]\epic\Images\Pharmacy\HeparinInfusions\heparin HIGH INTENSITY nomogram for [facility] [id number].pdf    Begin at (in units/kg/hr) 18        06/07/22 0548     IP VTE HIGH RISK PATIENT  Once         06/04/22 1645     Place sequential compression device  Until discontinued         06/04/22  1645     Reason for No Pharmacological VTE Prophylaxis  Once        Question:  Reasons:  Answer:  Risk of Bleeding    06/04/22 4725                Discharge Planning   MOR: 6/10/2022     Code Status: Full Code   Is the patient medically ready for discharge?:     Reason for patient still in hospital (select all that apply): Patient trending condition and Treatment  Discharge Plan A: Home                  Sarwat Crocker PA-C  Department of Hospital Medicine   [facility]

## 2022-06-08 NOTE — PLAN OF CARE
Problem: Physical Therapy  Goal: Physical Therapy Goal  Description: Goals to be met by: 22    Patient will increase functional independence with mobility by performin. Supine to sit with MInimal Assistance  2. Sit to stand transfer with Minimal Assistance  3. Bed to chair transfer with Minimal Assistance using Rolling Walker  4. Gait  x 50 feet with Minimal Assistance using Rolling Walker.   5. Lower extremity exercise program x20 reps    Outcome: Ongoing, Progressing   Ambulate with rw and Min A, WBAT RLE.

## 2022-06-08 NOTE — SUBJECTIVE & OBJECTIVE
Interval History: Notes reviewed, no acute events overnight. Patient seen this morning resting in bed with no acute distress. Patient remains on heparin drip this morning. US of BLE negative for DVT. H&H stable today following 1 unit of RBC. Plan to mobilize patient today. Started on lexapro and xanax prn for anxiety per pulmonology.  Patient accepted to Acute rehab pending discharge. Will continue to monitor changes in respiratory status and follow pulmonology recs.     Review of Systems   Constitutional:  Negative for chills, fatigue and fever.   HENT:  Negative for congestion, rhinorrhea, sore throat and trouble swallowing.    Respiratory:  Negative for cough and shortness of breath.    Cardiovascular:  Negative for chest pain and palpitations.   Gastrointestinal:  Positive for constipation. Negative for abdominal distention, abdominal pain, nausea and vomiting.   Endocrine: Negative for polydipsia and polyuria.   Genitourinary:  Negative for difficulty urinating, dysuria and hematuria.   Musculoskeletal:  Positive for arthralgias. Negative for myalgias.   Neurological:  Negative for dizziness, syncope and headaches.   Hematological:  Negative for adenopathy.   Psychiatric/Behavioral:  Negative for agitation, behavioral problems and confusion.    Objective:     Vital Signs (Most Recent):  Temp: 97.2 °F (36.2 °C) (06/08/22 0805)  Pulse: 99 (06/08/22 0805)  Resp: 16 (06/08/22 0805)  BP: (!) 106/52 (06/08/22 0805)  SpO2: 97 % (06/08/22 0805)   Vital Signs (24h Range):  Temp:  [96.5 °F (35.8 °C)-98.9 °F (37.2 °C)] 97.2 °F (36.2 °C)  Pulse:  [] 99  Resp:  [14-18] 16  SpO2:  [95 %-98 %] 97 %  BP: (106-147)/(52-67) 106/52     Weight: 81.6 kg (180 lb)  Body mass index is 36.36 kg/m².    Intake/Output Summary (Last 24 hours) at 6/8/2022 1125  Last data filed at 6/8/2022 0640  Gross per 24 hour   Intake 1664.13 ml   Output 1000 ml   Net 664.13 ml      Physical Exam  Vitals and nursing note reviewed.   Constitutional:        General: She is not in acute distress.     Appearance: She is obese. She is not ill-appearing.   HENT:      Head: Normocephalic and atraumatic.      Right Ear: External ear normal.      Left Ear: External ear normal.      Nose: Nose normal. No congestion.      Mouth/Throat:      Mouth: Mucous membranes are moist.      Pharynx: Oropharynx is clear. No oropharyngeal exudate.   Eyes:      Extraocular Movements: Extraocular movements intact.      Conjunctiva/sclera: Conjunctivae normal.      Pupils: Pupils are equal, round, and reactive to light.   Cardiovascular:      Rate and Rhythm: Normal rate and regular rhythm.      Pulses: Normal pulses.      Heart sounds: Normal heart sounds. No murmur heard.    No gallop.   Pulmonary:      Effort: Pulmonary effort is normal. No respiratory distress.      Breath sounds: Normal breath sounds. No wheezing or rales.      Comments: Currently on 4 L NC  Abdominal:      General: Abdomen is flat. There is no distension.      Palpations: Abdomen is soft.      Tenderness: There is no abdominal tenderness.   Musculoskeletal:         General: Tenderness present. No deformity. Normal range of motion.   Skin:     General: Skin is warm.      Findings: Bruising present.      Comments: Bruising to left forearm improving   Neurological:      General: No focal deficit present.      Mental Status: She is alert and oriented to person, place, and time. Mental status is at baseline.      Cranial Nerves: No cranial nerve deficit.      Sensory: No sensory deficit.   Psychiatric:         Mood and Affect: Mood normal.         Behavior: Behavior normal.       Significant Labs: All pertinent labs within the past 24 hours have been reviewed.  CBC:   Recent Labs   Lab 06/07/22  0451 06/07/22  1342 06/08/22  0510   WBC 11.05  --  9.68   HGB 7.8* 7.5* 8.4*   HCT 22.6* 22.8* 25.3*     --  165     CMP:   Recent Labs   Lab 06/07/22  0451   *   K 4.1   CL 99   CO2 27   *   BUN 15    CREATININE 0.8   CALCIUM 8.8   PROT 5.9*   ALBUMIN 2.7*   BILITOT 0.9   ALKPHOS 60   AST 42*   ALT 22   ANIONGAP 9   EGFRNONAA >60       Significant Imaging: I have reviewed all pertinent imaging results/findings within the past 24 hours.

## 2022-06-08 NOTE — ASSESSMENT & PLAN NOTE
Chronic, controlled.  Latest blood pressure and vitals reviewed-   Temp:  [96.5 °F (35.8 °C)-98.9 °F (37.2 °C)]   Pulse:  []   Resp:  [14-18]   BP: (106-147)/(51-67)   SpO2:  [95 %-100 %] .   Home meds for hypertension were reviewed and noted below.   Hypertension Medications             metoprolol succinate (TOPROL-XL) 25 MG 24 hr tablet Take 1 tablet (25 mg total) by mouth 2 (two) times a day.    nitroGLYCERIN (NITROSTAT) 0.4 MG SL tablet Place 0.4 mg under the tongue every 5 (five) minutes as needed for Chest pain.          While in the hospital, will manage blood pressure as follows; Continue home antihypertensive regimen    Will utilize p.r.n. blood pressure medication only if patient's blood pressure greater than  180/110 and she develops symptoms such as worsening chest pain or shortness of breath.

## 2022-06-08 NOTE — PLAN OF CARE
Plan of care reviewed with patient, verbalized understanding. Heparin drip infusing, therapeutic PTT. 4L O2 intact. Purewick in place, voiding without difficulty. Hourly rounding. Denies pain. Bed in lowest position and call light within reach.

## 2022-06-08 NOTE — PROGRESS NOTES
2022      Admit Date: 2022  Aimee Palumbo  New Patient Consult    Chief Complaint   Patient presents with    Fall     Trip and fall earlier today, right leg pain from hip to knee rated 10/10; + shortening and rotation        History of Present Illness:   Pt very active, mows grass, no resp issues nor prior pe. Tripped ppt right leg fx ppt admit with subsequent increase ox needs leading to ddimer and cta with lg clot burgen to my view.  Had had sign right leg pain post repair with swellign. No fh dvt/pe. No hormonal rx nor wt loss. No cancer.  Pt has had chr anxiety and depression.    left her with young son to be unger, another  had drug issue and 3rd  after many yrs marriage recently. She feels depressed.  She had no impressive acute sob with clots.  No h/o bleeding issues.  Had been on xanax in past at 1/2 mg - stopped as didn't wish to be on meds-- wishes to use now.          From [facility] 2022    HPI: Aimee Palumbo is a 71-year-old female with past medical history significant for coronary artery disease, GERD, hyperlipidemia, hypertension, obstructive sleep apnea on CPAP, and osteoporosis who presented to the emergency department today after a mechanical fall at home for further evaluation right lower extremity pain.  Pt states that she was at the home of a friend when she tripped and fell on the sidewalk.  ED evaluation was significant for a closed displaced right midshaft femur fracture.  Per report, the orthopedic surgeon on-call was contacted who states he will take her to surgery tomorrow morning.  Patient has no further complaints at this time.  H/o osas on cpap      Progress Note  PULMONARY    Admit Date: 2022      SUBJECTIVE:      no c/o, no bm, breathing well      PFSH and Allergies reviewed.    OBJECTIVE:     Vitals (Most recent):  Vitals:    22 0606   BP:    Pulse:    Resp: 15   Temp:        Vitals (24 hour range):  Temp:  [96.5  °F (35.8 °C)-98.9 °F (37.2 °C)]   Pulse:  []   Resp:  [14-18]   BP: (107-147)/(53-67)   SpO2:  [92 %-98 %]       Intake/Output Summary (Last 24 hours) at 6/8/2022 0612  Last data filed at 6/8/2022 0315  Gross per 24 hour   Intake 1232.9 ml   Output --   Net 1232.9 ml          Physical Exam:  The patient's neuro status (alertness,orientation,cognitive function,motor skills,), pharyngeal exam (oral lesions, hygiene, abn dentition,), Neck (jvd,mass,thyroid,nodes in neck and above/below clavicle),RESPIRATORY(symmetry,effort,fremitus,percussion,auscultation),  Cor(rhythm,heart tones including gallops,perfusion,edema)ABD(distention,hepatic&splenomegaly,tenderness,masses), Skin(rash,cyanosis),Psyc(affect,judgement,).  Exam negative except for these pertinent findings:    Alert,. chest is symmetric, no distress, normal percussion, normal fremitus and good normal breath sounds     Radiographs reviewed: view by direct vision    No results found for this or any previous visit.  ]    Labs     Recent Labs   Lab 06/08/22  0510   WBC 9.68   HGB 8.4*   HCT 25.3*      No results for input(s): NA, K, CL, CO2, BUN, CREATININE, GLU, CALCIUM, CAION, MG, PHOS, AST, ALT, ALKPHOS, BILITOT, BILIDIR, PROT, ALBUMIN, PREALBUMIN, AMYLASE, LIPASE, CRP, HSCRP, SEDRATE, PROCAL, INR, PTT, LABHEPA, LACTATE, TROPONINI, CPK, CPKMB, MB, BNP in the last 24 hours.No results for input(s): PH, PCO2, PO2, HCO3 in the last 24 hours.  Microbiology Results (last 7 days)     ** No results found for the last 168 hours. **          Impression:  Active Hospital Problems    Diagnosis  POA    *Closed fracture of shaft of right femur [S72.301A]  Yes    Acute pulmonary embolism [I26.99]  No    Acute hypoxemic respiratory failure [J96.01]  Yes    ARLETH on CPAP [G47.33, Z99.89]  Not Applicable    Osteoporosis [M81.0]  Yes    Hypertension [I10]  Yes    Hyperlipidemia [E78.5]  Yes    Gastroesophageal reflux disease with esophagitis [K21.00]  Yes     Coronary artery disease [I25.10]  Yes    Anxiety [F41.9]  Yes    Obesity [E66.9]  Yes    Depression [F32.A]  Yes      Resolved Hospital Problems   No resolved problems to display.          Plan: no fever, vss but pulse 120 earlier.  Now 104,   Hep drip, went from rm air to 2 to 4 lpm last 24 hrs.     hgb down ot 7.8 from 11.7 admit post femur fx.      cta rul pe and smaller left lung pes seen.     Needs 6 months + anticoagg-- consider provoiked clot.  Anxiety may hinder recovery-- suggest adding lexapro and prn xanax.  Needs ox for now.   Hep drip for another 24-48 hrs.     6/8 no fever, vss, hgb 8.4 after transfusion,   Needs bm,    Check for dvt-- if no dvt mobilize today may be ok.  If dvt would anticoagg 48 hrs.       Got blood, if counts stable could go to oral anticoagg soon.      Transfer to rehab once can mobilize in 1+ days.      Started lexapro for chr anxiety and depression. Has xanax prn 0.25 bid also.

## 2022-06-08 NOTE — NURSING
Patient constantly readjusting self in bed. Skin tear noticed to R upper posterior thigh while applying new purewick. Educated patient on proper movement and risk for skin injury. Triad applied to skin tear.

## 2022-06-09 VITALS
HEIGHT: 59 IN | BODY MASS INDEX: 36.29 KG/M2 | DIASTOLIC BLOOD PRESSURE: 72 MMHG | WEIGHT: 180 LBS | OXYGEN SATURATION: 97 % | TEMPERATURE: 98 F | RESPIRATION RATE: 16 BRPM | HEART RATE: 87 BPM | SYSTOLIC BLOOD PRESSURE: 125 MMHG

## 2022-06-09 LAB
ALBUMIN SERPL BCP-MCNC: 2.5 G/DL (ref 3.5–5.2)
ALP SERPL-CCNC: 66 U/L (ref 55–135)
ALT SERPL W/O P-5'-P-CCNC: 23 U/L (ref 10–44)
ANION GAP SERPL CALC-SCNC: 11 MMOL/L (ref 8–16)
APTT BLDCRRT: 48.6 SEC (ref 21–32)
AST SERPL-CCNC: 32 U/L (ref 10–40)
BASOPHILS # BLD AUTO: 0.03 K/UL (ref 0–0.2)
BASOPHILS NFR BLD: 0.3 % (ref 0–1.9)
BILIRUB SERPL-MCNC: 1.7 MG/DL (ref 0.1–1)
BUN SERPL-MCNC: 9 MG/DL (ref 8–23)
CALCIUM SERPL-MCNC: 9.2 MG/DL (ref 8.7–10.5)
CHLORIDE SERPL-SCNC: 104 MMOL/L (ref 95–110)
CO2 SERPL-SCNC: 26 MMOL/L (ref 23–29)
CREAT SERPL-MCNC: 0.7 MG/DL (ref 0.5–1.4)
DIFFERENTIAL METHOD: ABNORMAL
EOSINOPHIL # BLD AUTO: 0.1 K/UL (ref 0–0.5)
EOSINOPHIL NFR BLD: 0.9 % (ref 0–8)
ERYTHROCYTE [DISTWIDTH] IN BLOOD BY AUTOMATED COUNT: 13 % (ref 11.5–14.5)
EST. GFR  (AFRICAN AMERICAN): >60 ML/MIN/1.73 M^2
EST. GFR  (NON AFRICAN AMERICAN): >60 ML/MIN/1.73 M^2
GLUCOSE SERPL-MCNC: 168 MG/DL (ref 70–110)
HCT VFR BLD AUTO: 24.1 % (ref 37–48.5)
HGB BLD-MCNC: 8.4 G/DL (ref 12–16)
IMM GRANULOCYTES # BLD AUTO: 0.09 K/UL (ref 0–0.04)
IMM GRANULOCYTES NFR BLD AUTO: 0.9 % (ref 0–0.5)
LYMPHOCYTES # BLD AUTO: 1.4 K/UL (ref 1–4.8)
LYMPHOCYTES NFR BLD: 14 % (ref 18–48)
MCH RBC QN AUTO: 31.9 PG (ref 27–31)
MCHC RBC AUTO-ENTMCNC: 34.9 G/DL (ref 32–36)
MCV RBC AUTO: 92 FL (ref 82–98)
MONOCYTES # BLD AUTO: 0.8 K/UL (ref 0.3–1)
MONOCYTES NFR BLD: 8 % (ref 4–15)
NEUTROPHILS # BLD AUTO: 7.4 K/UL (ref 1.8–7.7)
NEUTROPHILS NFR BLD: 75.9 % (ref 38–73)
NRBC BLD-RTO: 0 /100 WBC
PLATELET # BLD AUTO: 135 K/UL (ref 150–450)
PMV BLD AUTO: 12.3 FL (ref 9.2–12.9)
POTASSIUM SERPL-SCNC: 3.6 MMOL/L (ref 3.5–5.1)
PROT SERPL-MCNC: 6.4 G/DL (ref 6–8.4)
RBC # BLD AUTO: 2.63 M/UL (ref 4–5.4)
SODIUM SERPL-SCNC: 141 MMOL/L (ref 136–145)
WBC # BLD AUTO: 9.74 K/UL (ref 3.9–12.7)

## 2022-06-09 PROCEDURE — 94761 N-INVAS EAR/PLS OXIMETRY MLT: CPT

## 2022-06-09 PROCEDURE — 25000003 PHARM REV CODE 250: Performed by: ORTHOPAEDIC SURGERY

## 2022-06-09 PROCEDURE — 99900035 HC TECH TIME PER 15 MIN (STAT)

## 2022-06-09 PROCEDURE — 97530 THERAPEUTIC ACTIVITIES: CPT | Mod: CQ

## 2022-06-09 PROCEDURE — 94799 UNLISTED PULMONARY SVC/PX: CPT

## 2022-06-09 PROCEDURE — 36415 COLL VENOUS BLD VENIPUNCTURE: CPT

## 2022-06-09 PROCEDURE — 25000003 PHARM REV CODE 250

## 2022-06-09 PROCEDURE — 27000221 HC OXYGEN, UP TO 24 HOURS

## 2022-06-09 PROCEDURE — 85025 COMPLETE CBC W/AUTO DIFF WBC: CPT | Performed by: NURSE PRACTITIONER

## 2022-06-09 PROCEDURE — 63600175 PHARM REV CODE 636 W HCPCS: Performed by: ORTHOPAEDIC SURGERY

## 2022-06-09 PROCEDURE — 85730 THROMBOPLASTIN TIME PARTIAL: CPT | Performed by: INTERNAL MEDICINE

## 2022-06-09 PROCEDURE — 80053 COMPREHEN METABOLIC PANEL: CPT

## 2022-06-09 PROCEDURE — 25000003 PHARM REV CODE 250: Performed by: INTERNAL MEDICINE

## 2022-06-09 PROCEDURE — 99222 PR INITIAL HOSPITAL CARE,LEVL II: ICD-10-PCS | Mod: ,,, | Performed by: PHYSICAL MEDICINE & REHABILITATION

## 2022-06-09 PROCEDURE — 99222 1ST HOSP IP/OBS MODERATE 55: CPT | Mod: ,,, | Performed by: PHYSICAL MEDICINE & REHABILITATION

## 2022-06-09 PROCEDURE — 97116 GAIT TRAINING THERAPY: CPT | Mod: CQ

## 2022-06-09 RX ORDER — AMOXICILLIN 250 MG
1 CAPSULE ORAL 2 TIMES DAILY
Qty: 14 TABLET | Refills: 0 | Status: SHIPPED | OUTPATIENT
Start: 2022-06-09 | End: 2022-06-16

## 2022-06-09 RX ORDER — OXYCODONE AND ACETAMINOPHEN 10; 325 MG/1; MG/1
1 TABLET ORAL EVERY 6 HOURS PRN
Qty: 28 TABLET | Refills: 0 | Status: SHIPPED | OUTPATIENT
Start: 2022-06-09 | End: 2022-06-23

## 2022-06-09 RX ADMIN — METHOCARBAMOL TABLETS 750 MG: 750 TABLET, COATED ORAL at 01:06

## 2022-06-09 RX ADMIN — THERA TABS 1 TABLET: TAB at 09:06

## 2022-06-09 RX ADMIN — PANTOPRAZOLE SODIUM 40 MG: 40 TABLET, DELAYED RELEASE ORAL at 09:06

## 2022-06-09 RX ADMIN — OXYCODONE AND ACETAMINOPHEN 1 TABLET: 10; 325 TABLET ORAL at 02:06

## 2022-06-09 RX ADMIN — Medication 1 TABLET: at 09:06

## 2022-06-09 RX ADMIN — POLYETHYLENE GLYCOL 3350 17 G: 17 POWDER, FOR SOLUTION ORAL at 09:06

## 2022-06-09 RX ADMIN — ESCITALOPRAM OXALATE 10 MG: 10 TABLET, FILM COATED ORAL at 09:06

## 2022-06-09 RX ADMIN — METHOCARBAMOL TABLETS 750 MG: 750 TABLET, COATED ORAL at 09:06

## 2022-06-09 RX ADMIN — APIXABAN 10 MG: 2.5 TABLET, FILM COATED ORAL at 01:06

## 2022-06-09 RX ADMIN — PRAVASTATIN SODIUM 80 MG: 40 TABLET ORAL at 09:06

## 2022-06-09 RX ADMIN — SODIUM CHLORIDE, SODIUM LACTATE, POTASSIUM CHLORIDE, AND CALCIUM CHLORIDE: .6; .31; .03; .02 INJECTION, SOLUTION INTRAVENOUS at 09:06

## 2022-06-09 RX ADMIN — DOCUSATE SODIUM AND SENNOSIDES 1 TABLET: 8.6; 5 TABLET, FILM COATED ORAL at 09:06

## 2022-06-09 RX ADMIN — ALPRAZOLAM 0.25 MG: 0.25 TABLET ORAL at 10:06

## 2022-06-09 RX ADMIN — METOPROLOL SUCCINATE 25 MG: 25 TABLET, EXTENDED RELEASE ORAL at 09:06

## 2022-06-09 NOTE — PROGRESS NOTES
Per Filomena at [facility], nurse can call report to [phone].  Guardian should be there at [address].      BRENNA Paniagua notified

## 2022-06-09 NOTE — PLAN OF CARE
Pt cleared for [facility]     06/09/22 1357   Final Note   Assessment Type Final Discharge Note   Anticipated Discharge Disposition Rehab   Post-Acute Status   Post-Acute Authorization Placement   Post-Acute Placement Status Set-up Complete/Auth obtained

## 2022-06-09 NOTE — PROGRESS NOTES
[facility]  Orthopedics  Progress Note    Patient Name: Aimee Palumbo  MRN: [id number]  Admission Date: 6/4/2022  Hospital Length of Stay: 5 days  Attending Provider: Tanner Chappell MD  Primary Care Provider: Amor Lamas Jr, MD  Follow-up For: Procedure(s) (LRB):  INSERTION, INTRAMEDULLARY JOHAN, FEMUR (Right)    Post-Operative Day: 4 Days Post-Op  Subjective:     Principal Problem:Closed fracture of shaft of right femur      Interval History:  Slow with PT. Pain well controlled    Review of patient's allergies indicates:  No Known Allergies    Current Facility-Administered Medications   Medication    acetaminophen tablet 650 mg    albuterol-ipratropium 2.5 mg-0.5 mg/3 mL nebulizer solution 3 mL    ALPRAZolam tablet 0.25 mg    aluminum-magnesium hydroxide-simethicone 200-200-20 mg/5 mL suspension 30 mL    apixaban tablet 10 mg    bisacodyL suppository 10 mg    calcium-vitamin D3 500 mg-5 mcg (200 unit) per tablet 1 tablet    cyclobenzaprine tablet 5 mg    dextrose 10% bolus 125 mL    dextrose 10% bolus 250 mL    diphenhydrAMINE capsule 25 mg    EScitalopram oxalate tablet 10 mg    ezetimibe tablet 10 mg    glucagon (human recombinant) injection 1 mg    glucose chewable tablet 16 g    glucose chewable tablet 24 g    lactated ringers infusion    loperamide capsule 2 mg    magnesium oxide tablet 800 mg    magnesium oxide tablet 800 mg    melatonin tablet 6 mg    methocarbamoL tablet 750 mg    metoprolol succinate (TOPROL-XL) 24 hr tablet 25 mg    morphine injection 2 mg    morphine injection 4 mg    multivitamin tablet    naloxone 0.4 mg/mL injection 0.02 mg    ondansetron injection 4 mg    oxyCODONE-acetaminophen  mg per tablet 1 tablet    pantoprazole EC tablet 40 mg    phenyleph-min oil-petrolatum 0.25-14-74.9 % ointment 1 applicator    polyethylene glycol packet 17 g    potassium bicarbonate disintegrating tablet 35 mEq    potassium bicarbonate  "disintegrating tablet 50 mEq    potassium bicarbonate disintegrating tablet 60 mEq    potassium, sodium phosphates 280-160-250 mg packet 2 packet    potassium, sodium phosphates 280-160-250 mg packet 2 packet    potassium, sodium phosphates 280-160-250 mg packet 2 packet    pravastatin tablet 80 mg    senna-docusate 8.6-50 mg per tablet 1 tablet    simethicone chewable tablet 80 mg    sodium chloride 0.9% flush 10 mL    sodium chloride 0.9% flush 10 mL    sodium chloride 0.9% flush 10 mL    sodium chloride 0.9% flush 10 mL     Objective:     Vital Signs (Most Recent):  Temp: 97.6 °F (36.4 °C) (06/09/22 0808)  Pulse: 95 (06/09/22 0808)  Resp: 16 (06/09/22 0808)  BP: (!) 145/70 (06/09/22 0808)  SpO2: 98 % (06/09/22 0808)   Vital Signs (24h Range):  Temp:  [96.8 °F (36 °C)-97.8 °F (36.6 °C)] 97.6 °F (36.4 °C)  Pulse:  [] 95  Resp:  [16-18] 16  SpO2:  [97 %-100 %] 98 %  BP: (110-148)/(51-81) 145/70     Weight: 81.6 kg (180 lb)  Height: 4' 11" (149.9 cm)  Body mass index is 36.36 kg/m².    No intake or output data in the 24 hours ending 06/09/22 1202      General    Nursing note and vitals reviewed.  Constitutional: She is oriented to person, place, and time. She appears well-developed and well-nourished. No distress.   HENT:   Head: Atraumatic.   Eyes: EOM are normal.   Cardiovascular:  Normal rate.            Pulmonary/Chest: Effort normal.   Abdominal: Soft.   Neurological: She is alert and oriented to person, place, and time.   Psychiatric: Her behavior is normal.             Right Hip Exam     Inspection   Swelling: present  Erythema: absent    Tenderness   The patient tender to palpation of the trochanteric bursa.    Range of Motion   Abduction:  abnormal   Adduction:  abnormal   Extension:  abnormal   Flexion:  abnormal   External rotation:  abnormal   Internal rotation:  abnormal     Tests   Log Roll: positive    Other   Sensation: normal    Comments:  Dressings are clean dry and intact.  " Compartments are soft.  Left Hip Exam   Left hip exam is normal.          Vascular Exam     Right Pulses  Dorsalis Pedis:      2+          Significant Labs: BMP:   Recent Labs   Lab 06/09/22  1108   *      K 3.6      CO2 26   BUN 9   CREATININE 0.7   CALCIUM 9.2       CBC:   Recent Labs   Lab 06/07/22  1342 06/08/22  0510 06/09/22  0329   WBC  --  9.68 9.74   HGB 7.5* 8.4* 8.4*   HCT 22.8* 25.3* 24.1*   PLT  --  165 135*       Coagulation:   Recent Labs   Lab 06/08/22  1356 06/08/22  2023 06/09/22  0726   APTT 63.2* 48.1* 48.6*       All pertinent labs within the past 24 hours have been reviewed.    Significant Imaging: X-Ray: I have reviewed all pertinent results/findings and my personal findings are:  X-rays of the left wrist show some soft tissue swelling.  Moderate CMC arthritis.  No acute fracture noted.    Assessment/Plan:     * Closed fracture of shaft of right femur  Continue with physical therapy.  Weightbearing as tolerated right lower extremity with a rolling walker.  Patient will need placement had rehab for skilled nursing facility.              Choco Cristobal MD  Orthopedics  [facility]

## 2022-06-09 NOTE — CARE UPDATE
06/09/22 0741   Patient Assessment/Suction   Level of Consciousness (AVPU) alert   All Lung Fields Breath Sounds diminished   PRE-TX-O2   O2 Device (Oxygen Therapy) nasal cannula   $ Is the patient on Low Flow Oxygen? Yes   Flow (L/min) (S)  3  (decreased to 2lpm)   SpO2 98 %   Pulse Oximetry Type Intermittent   $ Pulse Oximetry - Multiple Charge Pulse Oximetry - Multiple   Pulse 84   Resp 18   Aerosol Therapy   $ Aerosol Therapy Charges PRN treatment not required   Incentive Spirometer   $ Incentive Spirometer Charges done with encouragement   Incentive Spirometer Predicted Level (mL) 1350   Administration (IS) instruction provided, follow-up   Number of Repetitions (IS) 10   Level Incentive Spirometer (mL) 1250   Skin Integrity   $ Wound Care Tech Time 15 min   Area Observed Bridge of nose;Cheek   Skin Appearance without discoloration   Preset CPAP/BiPAP Settings   Mode Of Delivery Standby

## 2022-06-09 NOTE — PROGRESS NOTES
2022      Admit Date: 2022  Aimee Palumbo  New Patient Consult    Chief Complaint   Patient presents with    Fall     Trip and fall earlier today, right leg pain from hip to knee rated 10/10; + shortening and rotation        History of Present Illness:   Pt very active, mows grass, no resp issues nor prior pe. Tripped ppt right leg fx ppt admit with subsequent increase ox needs leading to ddimer and cta with lg clot burgen to my view.  Had had sign right leg pain post repair with swellign. No fh dvt/pe. No hormonal rx nor wt loss. No cancer.  Pt has had chr anxiety and depression.    left her with young son to be unger, another  had drug issue and 3rd  after many yrs marriage recently. She feels depressed.  She had no impressive acute sob with clots.  No h/o bleeding issues.  Had been on xanax in past at 1/2 mg - stopped as didn't wish to be on meds-- wishes to use now.          From [facility] 2022    HPI: Aimee Palumbo is a 71-year-old female with past medical history significant for coronary artery disease, GERD, hyperlipidemia, hypertension, obstructive sleep apnea on CPAP, and osteoporosis who presented to the emergency department today after a mechanical fall at home for further evaluation right lower extremity pain.  Pt states that she was at the home of a friend when she tripped and fell on the sidewalk.  ED evaluation was significant for a closed displaced right midshaft femur fracture.  Per report, the orthopedic surgeon on-call was contacted who states he will take her to surgery tomorrow morning.  Patient has no further complaints at this time.  H/o osas on cpap      Progress Note  PULMONARY    Admit Date: 2022      SUBJECTIVE:      no c/o, no bm, breathing well   no c/o resp , having severe hemorrhoid problem and not voiding well      PFSH and Allergies reviewed.    OBJECTIVE:     Vitals (Most recent):  Vitals:    22 0504   BP:  (!) 148/81   Pulse: 96   Resp: 18   Temp: 97.3 °F (36.3 °C)       Vitals (24 hour range):  Temp:  [96.8 °F (36 °C)-97.8 °F (36.6 °C)]   Pulse:  []   Resp:  [16-18]   BP: (106-148)/(51-81)   SpO2:  [97 %-100 %]       Intake/Output Summary (Last 24 hours) at 6/9/2022 0631  Last data filed at 6/8/2022 0640  Gross per 24 hour   Intake 974.13 ml   Output --   Net 974.13 ml          Physical Exam:  The patient's neuro status (alertness,orientation,cognitive function,motor skills,), pharyngeal exam (oral lesions, hygiene, abn dentition,), Neck (jvd,mass,thyroid,nodes in neck and above/below clavicle),RESPIRATORY(symmetry,effort,fremitus,percussion,auscultation),  Cor(rhythm,heart tones including gallops,perfusion,edema)ABD(distention,hepatic&splenomegaly,tenderness,masses), Skin(rash,cyanosis),Psyc(affect,judgement,).  Exam negative except for these pertinent findings:    Alert,. chest is symmetric, no distress, normal percussion, normal fremitus and good normal breath sounds     Radiographs reviewed: view by direct vision    No results found for this or any previous visit.  ]    Labs     Recent Labs   Lab 06/09/22  0329   WBC 9.74   HGB 8.4*   HCT 24.1*   *   No results for input(s): NA, K, CL, CO2, BUN, CREATININE, GLU, CALCIUM, CAION, MG, PHOS, AST, ALT, ALKPHOS, BILITOT, BILIDIR, PROT, ALBUMIN, PREALBUMIN, AMYLASE, LIPASE, CRP, HSCRP, SEDRATE, PROCAL, INR, PTT, LABHEPA, LACTATE, TROPONINI, CPK, CPKMB, MB, BNP in the last 24 hours.No results for input(s): PH, PCO2, PO2, HCO3 in the last 24 hours.  Microbiology Results (last 7 days)     ** No results found for the last 168 hours. **          Impression:  Active Hospital Problems    Diagnosis  POA    *Closed fracture of shaft of right femur [S72.301A]  Yes    Acute pulmonary embolism [I26.99]  No    Acute hypoxemic respiratory failure [J96.01]  Yes    ARLETH on CPAP [G47.33, Z99.89]  Not Applicable    Osteoporosis [M81.0]  Yes    Hypertension [I10]  Yes     Hyperlipidemia [E78.5]  Yes    Gastroesophageal reflux disease with esophagitis [K21.00]  Yes    Coronary artery disease [I25.10]  Yes    Anxiety [F41.9]  Yes    Obesity [E66.9]  Yes    Depression [F32.A]  Yes      Resolved Hospital Problems   No resolved problems to display.          Plan: no fever, vss but pulse 120 earlier.  Now 104,   Hep drip, went from rm air to 2 to 4 lpm last 24 hrs.     hgb down ot 7.8 from 11.7 admit post femur fx.      cta rul pe and smaller left lung pes seen.     Needs 6 months + anticoagg-- consider provoiked clot.  Anxiety may hinder recovery-- suggest adding lexapro and prn xanax.  Needs ox for now.   Hep drip for another 24-48 hrs.     6/8 no fever, vss, hgb 8.4 after transfusion,   Needs bm,    Check for dvt-- if no dvt mobilize today may be ok.  If dvt would anticoagg 48 hrs.       Got blood, if counts stable could go to oral anticoagg soon.      Transfer to rehab once can mobilize in 1+ days.      Started lexapro for chr anxiety and depression. Has xanax prn 0.25 bid also.      6/9 no fever, vss, 3lpm at times,   No dvt, c/o hemorrhoids  Will dose phenylephrine suppository- had some blood per rectum --should be hemorrhoids with her c/o?.  May need bladder scan - was 300 cc earlier.  Could go to oral anticoagg soon - could wait til bowel/bladder stable but doubt anticoagg cause of problems.

## 2022-06-09 NOTE — PT/OT/SLP PROGRESS
Physical Therapy Treatment    Patient Name:  Aimee Palumbo   MRN:  [id number]    Recommendations:     Discharge Recommendations:  rehabilitation facility, nursing facility, skilled   Discharge Equipment Recommendations: walker, rolling   Barriers to discharge: None    Assessment:     Aimee Palumbo is a 71 y.o. female admitted with a medical diagnosis of Closed fracture of shaft of right femur.  She presents with the following impairments/functional limitations:  weakness, impaired endurance, impaired self care skills, impaired functional mobilty, gait instability, impaired balance, decreased lower extremity function, pain, impaired cardiopulmonary response to activity, orthopedic precautions . Awake, alert, seated EOB with nurse and Dr. De Anda.   Expresses desire to progress mobility and go to rehab.  Ambulated 15' + 30' x 2 with rw and Min A with IV and O2 attached.  Seated rests between each. Also reports sore L wrist with pressure. Sat up in chair at bedside , set up with breakfast.     Rehab Prognosis: Good; patient would benefit from acute skilled PT services to address these deficits and reach maximum level of function.    Recent Surgery: Procedure(s) (LRB):  INSERTION, INTRAMEDULLARY JOHAN, FEMUR (Right) 4 Days Post-Op    Plan:     During this hospitalization, patient to be seen daily (1-2 x / day) to address the identified rehab impairments via gait training, therapeutic activities, therapeutic exercises and progress toward the following goals:    · Plan of Care Expires:  06/30/22    Subjective     Chief Complaint: R thigh and L wrist pain  Patient/Family Comments/goals: to go to rehab facility.  Pain/Comfort:  · Pain Rating 1: other (see comments) (did not rate)  · Location - Side 1: Right  · Location - Orientation 1: generalized  · Location 1: thigh  · Pain Addressed 1: Reposition, Nurse notified      Objective:     Communicated with nurse P[place] prior to session.  Patient found sitting edge of bed with  bed alarm, oxygen, peripheral IV upon PT entry to room.     General Precautions: Standard, fall   Orthopedic Precautions:RLE weight bearing as tolerated   Braces: N/A  Respiratory Status: Nasal cannula, flow 2 L/min     Functional Mobility:  · Transfers:     · Sit to Stand:  minimum assistance with rolling walker  · Gait: 15' + 30' x 2 rw, Min A , WBAT RLE. Seated rest required between each.      AM-PAC 6 CLICK MOBILITY          Therapeutic Activities and Exercises:   Stood with rw and Min A.   Ambulated slowly with rw and Min A with verbal cues for technique. Seated rest required between each.   Returned to room in chair. Set up with breakfast.      Patient left up in chair with all lines intact, call button in reach, chair alarm on and nurse Siva notified..    GOALS:   Multidisciplinary Problems     Physical Therapy Goals        Problem: Physical Therapy    Goal Priority Disciplines Outcome Goal Variances Interventions   Physical Therapy Goal     PT, PT/OT Ongoing, Progressing     Description: Goals to be met by: 22    Patient will increase functional independence with mobility by performin. Supine to sit with MInimal Assistance  2. Sit to stand transfer with Minimal Assistance  3. Bed to chair transfer with Minimal Assistance using Rolling Walker  4. Gait  x 50 feet with Minimal Assistance using Rolling Walker.   5. Lower extremity exercise program x20 reps                     Time Tracking:     PT Received On: 22  PT Start Time: 840     PT Stop Time: 903  PT Total Time (min): 23 min     Billable Minutes: Gait Training 15min and Therapeutic Activity 8min    Treatment Type: Treatment  PT/PTA: PTA     PTA Visit Number: 2     2022

## 2022-06-09 NOTE — SUBJECTIVE & OBJECTIVE
Principal Problem:Closed fracture of shaft of right femur      Interval History:  Slow with PT. Pain well controlled    Review of patient's allergies indicates:  No Known Allergies    Current Facility-Administered Medications   Medication    acetaminophen tablet 650 mg    albuterol-ipratropium 2.5 mg-0.5 mg/3 mL nebulizer solution 3 mL    ALPRAZolam tablet 0.25 mg    aluminum-magnesium hydroxide-simethicone 200-200-20 mg/5 mL suspension 30 mL    apixaban tablet 10 mg    bisacodyL suppository 10 mg    calcium-vitamin D3 500 mg-5 mcg (200 unit) per tablet 1 tablet    cyclobenzaprine tablet 5 mg    dextrose 10% bolus 125 mL    dextrose 10% bolus 250 mL    diphenhydrAMINE capsule 25 mg    EScitalopram oxalate tablet 10 mg    ezetimibe tablet 10 mg    glucagon (human recombinant) injection 1 mg    glucose chewable tablet 16 g    glucose chewable tablet 24 g    lactated ringers infusion    loperamide capsule 2 mg    magnesium oxide tablet 800 mg    magnesium oxide tablet 800 mg    melatonin tablet 6 mg    methocarbamoL tablet 750 mg    metoprolol succinate (TOPROL-XL) 24 hr tablet 25 mg    morphine injection 2 mg    morphine injection 4 mg    multivitamin tablet    naloxone 0.4 mg/mL injection 0.02 mg    ondansetron injection 4 mg    oxyCODONE-acetaminophen  mg per tablet 1 tablet    pantoprazole EC tablet 40 mg    phenyleph-min oil-petrolatum 0.25-14-74.9 % ointment 1 applicator    polyethylene glycol packet 17 g    potassium bicarbonate disintegrating tablet 35 mEq    potassium bicarbonate disintegrating tablet 50 mEq    potassium bicarbonate disintegrating tablet 60 mEq    potassium, sodium phosphates 280-160-250 mg packet 2 packet    potassium, sodium phosphates 280-160-250 mg packet 2 packet    potassium, sodium phosphates 280-160-250 mg packet 2 packet    pravastatin tablet 80 mg    senna-docusate 8.6-50 mg per tablet 1 tablet    simethicone chewable tablet 80 mg    sodium chloride 0.9% flush 10 mL    sodium  "chloride 0.9% flush 10 mL    sodium chloride 0.9% flush 10 mL    sodium chloride 0.9% flush 10 mL     Objective:     Vital Signs (Most Recent):  Temp: 97.6 °F (36.4 °C) (06/09/22 0808)  Pulse: 95 (06/09/22 0808)  Resp: 16 (06/09/22 0808)  BP: (!) 145/70 (06/09/22 0808)  SpO2: 98 % (06/09/22 0808)   Vital Signs (24h Range):  Temp:  [96.8 °F (36 °C)-97.8 °F (36.6 °C)] 97.6 °F (36.4 °C)  Pulse:  [] 95  Resp:  [16-18] 16  SpO2:  [97 %-100 %] 98 %  BP: (110-148)/(51-81) 145/70     Weight: 81.6 kg (180 lb)  Height: 4' 11" (149.9 cm)  Body mass index is 36.36 kg/m².    No intake or output data in the 24 hours ending 06/09/22 1202      General    Nursing note and vitals reviewed.  Constitutional: She is oriented to person, place, and time. She appears well-developed and well-nourished. No distress.   HENT:   Head: Atraumatic.   Eyes: EOM are normal.   Cardiovascular:  Normal rate.            Pulmonary/Chest: Effort normal.   Abdominal: Soft.   Neurological: She is alert and oriented to person, place, and time.   Psychiatric: Her behavior is normal.             Right Hip Exam     Inspection   Swelling: present  Erythema: absent    Tenderness   The patient tender to palpation of the trochanteric bursa.    Range of Motion   Abduction:  abnormal   Adduction:  abnormal   Extension:  abnormal   Flexion:  abnormal   External rotation:  abnormal   Internal rotation:  abnormal     Tests   Log Roll: positive    Other   Sensation: normal    Comments:  Dressings are clean dry and intact.  Compartments are soft.  Left Hip Exam   Left hip exam is normal.          Vascular Exam     Right Pulses  Dorsalis Pedis:      2+          Significant Labs: BMP:   Recent Labs   Lab 06/09/22  1108   *      K 3.6      CO2 26   BUN 9   CREATININE 0.7   CALCIUM 9.2       CBC:   Recent Labs   Lab 06/07/22  1342 06/08/22  0510 06/09/22  0329   WBC  --  9.68 9.74   HGB 7.5* 8.4* 8.4*   HCT 22.8* 25.3* 24.1*   PLT  --  165 135* "       Coagulation:   Recent Labs   Lab 06/08/22  1356 06/08/22 2023 06/09/22  0726   APTT 63.2* 48.1* 48.6*       All pertinent labs within the past 24 hours have been reviewed.    Significant Imaging: X-Ray: I have reviewed all pertinent results/findings and my personal findings are:  X-rays of the left wrist show some soft tissue swelling.  Moderate CMC arthritis.  No acute fracture noted.

## 2022-06-09 NOTE — DISCHARGE INSTRUCTIONS
[facility] Transfer Orders        Admit to: [facility]    Diagnoses:   Active Hospital Problems    Diagnosis  POA    *Closed fracture of shaft of right femur [S72.301A]  Yes    Acute pulmonary embolism [I26.99]  No    Acute hypoxemic respiratory failure [J96.01]  Yes    ARLETH on CPAP [G47.33, Z99.89]  Not Applicable    Osteoporosis [M81.0]  Yes    Hypertension [I10]  Yes    Hyperlipidemia [E78.5]  Yes    Gastroesophageal reflux disease with esophagitis [K21.00]  Yes    Coronary artery disease [I25.10]  Yes    Anxiety [F41.9]  Yes    Obesity [E66.9]  Yes    Depression [F32.A]  Yes      Resolved Hospital Problems   No resolved problems to display.     Allergies: Review of patient's allergies indicates:  No Known Allergies    Code Status: Full    Vitals: Routine       Diet: regular diet    Activity: Activity as tolerated    Nursing Precautions: Fall    Bed/Surface: Low Air Loss    Consults: PT to evaluate and treat- 5 times a week and OT to evaluate and treat- 5 times a week    Oxygen: room air    Dialysis: Patient is not on dialysis.     Labs: First blood draw on 6/10/2022.              CBL and BMP    Pending Diagnostic Studies:       Procedure Component Value Units Date/Time    EKG 12-lead [[id number]] Collected: 06/07/22 1343    Order Status: Sent Lab Status: In process Updated: 06/08/22 0835    Narrative:      Test Reason : I26.99,    Vent. Rate : 096 BPM     Atrial Rate : 096 BPM     P-R Int : 140 ms          QRS Dur : 082 ms      QT Int : 348 ms       P-R-T Axes : 051 022 006 degrees     QTc Int : 439 ms    Normal sinus rhythm  Cannot rule out Inferior infarct ,age undetermined  Abnormal ECG  When compared with ECG of 04-JUN-2022 15:53,  Vent. rate has increased BY  36 BPM  Minimal criteria for Inferior infarct are now Present    Referred By: [email or web address]   SELF           Confirmed By:             IV Access: PICC     Medications: Discontinue all previous medication orders, if any.  See new list below.  Current Discharge Medication List        START taking these medications    Details   apixaban (ELIQUIS) 5 mg Tab Take 2 tablets (10 mg total) by mouth 2 (two) times daily for 7 days, THEN 1 tablet (5 mg total) 2 (two) times daily.  Qty: 88 tablet, Refills: 0      oxyCODONE-acetaminophen (PERCOCET)  mg per tablet Take 1 tablet by mouth every 6 (six) hours as needed for Pain.  Qty: 28 tablet, Refills: 0    Comments: Quantity prescribed more than 7 day supply? No      senna-docusate 8.6-50 mg (PERICOLACE) 8.6-50 mg per tablet Take 1 tablet by mouth 2 (two) times daily. for 7 days  Qty: 14 tablet, Refills: 0           CONTINUE these medications which have NOT CHANGED    Details   aspirin (ECOTRIN) 81 MG EC tablet Take 81 mg by mouth once daily.      calcium-vitamin D3 (CALCIUM 500 + D) 500 mg(1,250mg) -200 unit per tablet Take 1 tablet by mouth 2 (two) times daily with meals.       ergocalciferol (ERGOCALCIFEROL) 50,000 unit Cap Take 50,000 Units by mouth every 30 days. TWICE MONTHLY      ezetimibe (ZETIA) 10 mg tablet Take 10 mg by mouth once daily.      metoprolol succinate (TOPROL-XL) 25 MG 24 hr tablet Take 1 tablet (25 mg total) by mouth 2 (two) times a day.  Qty: 180 tablet, Refills: 3    Comments: .      multivitamin (ONE DAILY MULTIVITAMIN) per tablet Take 1 tablet by mouth 2 (two) times a day.       nitroGLYCERIN (NITROSTAT) 0.4 MG SL tablet Place 0.4 mg under the tongue every 5 (five) minutes as needed for Chest pain.      pantoprazole (PROTONIX) 40 MG tablet TAKE 1 TABLET BY MOUTH EVERY DAY  Qty: 30 tablet, Refills: 1      rosuvastatin (CRESTOR) 40 MG Tab Take 40 mg by mouth once daily.            Follow up:    Follow-up Information       Amor Lamas Jr, MD Follow up in 3 day(s).    Specialty: Internal Medicine  Why: hospital follow up  Contact information:  [address] [place] [id number] [phone]               Choco Cristobal MD Follow up in 2 week(s).     Specialties: Sports Medicine, Orthopedic Surgery  Why: hospital follow up  Contact information:  [address] [place] [address] [address]ues [address] [place] [id number]  [phone]                               Immunizations Administered as of 6/9/2022       No immunizations on file.                Sarwat Crocker PA-C

## 2022-06-09 NOTE — NURSING
Report given to EDILMA Mc RN at [facility],  IV removed w/ tip intact, pt safely wheeled out to vehicle by [employer] personnel.

## 2022-06-09 NOTE — ASSESSMENT & PLAN NOTE
Continue with physical therapy.  Weightbearing as tolerated right lower extremity with a rolling walker.  Patient will need placement had rehab for skilled nursing facility.

## 2022-06-10 NOTE — PHYSICIAN QUERY
PT Name: Aimee Palumbo  MR #: [id number]    DOCUMENTATION CLARIFICATION      CDS/: WILLIE Pena, RN               Contact information:[email or web address]    This form is a permanent document in the medical record.      Query Date: Rehana 10, 2022    By submitting this query, we are merely seeking further clarification of documentation. Please utilize your independent clinical judgment when addressing the question(s) below.    The Medical Record contains the following:   Indicators  Supporting Clinical Findings Location in Medical Record    Anemia documented      x H&H  11.7 & 34.8 on 6/4/2022     9.7 & 29.5 on 6/5/2022     8.5 & 25.1 on 6/6/2022     7.8 & 22.6 on 6/7/2022 @ 04:51     7.5 & 22.8 on 6/7/2022 @ 13:42     8.4 & 25.3 on 6/8/2022     8.4 & 24.1 on 6/9/2022   Lab Results 6/4 thru 6/9/2022    x BP                    HR BP: (113-147)/(59-67)          Pulse:  []    BP: (103-139)/(51-76)          Pulse:  []   BP: (106-147)/(52-67)          Pulse:  []  Hospital Medicine 06/06 thru 06/08/2022    GI bleeding documented      Acute bleeding (Non GI site)      x Transfusion(s)                1 unit of RBC ordered  Hospital Medicine Progress Notes 06/08/2022    x Acute/Chronic illness Closed fracture of shaft of right femur  Acute hypoxemic respiratory failure  Acute pulmonary embolism  ARLETH on CPAP, Obesity, Depression, Anxiety  Coronary artery disease, Gastroesophageal reflux disease with esophagitis, Hyperlipidemia, Hypertension, Osteoporosis   Hospital Medicine Progress Notes 06/08/2022    x Treatments hgb 8.4 after transfusion      H&H stable today following 1 unit of RBC.   Pulmonology Progress Notes 06/08/2022    Hospital Medicine Progress Notes 06/08/2022      x Other Closed fracture of shaft of right femur    Procedure:    INSERTION, INTRAMEDULLARY JOHAN, FEMUR (Right)      Estimated Blood Loss (EBL): 56.78 mL    hgb down ot 7.8 from 11.7 admit post femur fx.  Orthopedic Surgery Op  Note 06/05/2022            Pulmonology Progress Notes 06/8/2022     Provider, please specify diagnosis or diagnoses associated with above clinical findings.   [ x  ] Acute blood loss anemia    [   ] Acute blood loss anemia expected post-operatively    [   ] Anemia, unspecified    [   ] Other Hematological Diagnosis (please specify): _________________   [   ] Clinically Undetermined              Please document in your progress notes daily for the duration of treatment, until resolved, and include in your discharge summary.    Form No. [id number]

## 2022-06-10 NOTE — DISCHARGE SUMMARY
[facility] Medicine  Discharge Summary      Patient Name: Aimee Palumbo  MRN: [id number]  Patient Class: IP- Inpatient  Admission Date: 6/4/2022  Hospital Length of Stay: 5 days  Discharge Date and Time: 6/9/2022  3:19 PM  Attending Physician: Tanner Chappell M.D.  Discharging Provider: Sarwat Crocker PA-C  Primary Care Provider: Amor Lamas Jr, MD      HPI:   Aimee Palumbo is a 71-year-old female with past medical history significant for coronary artery disease, GERD, hyperlipidemia, hypertension, obstructive sleep apnea on CPAP, and osteoporosis who presented to the emergency department today after a mechanical fall at home for further evaluation right lower extremity pain.  Pt states that she was at the home of a friend when she tripped and fell on the sidewalk.  ED evaluation was significant for a closed displaced right midshaft femur fracture.  Per report, the orthopedic surgeon on-call was contacted who states he will take her to surgery tomorrow morning.  Patient has no further complaints at this time.      Procedure(s) (LRB):  INSERTION, INTRAMEDULLARY JOHAN, FEMUR (Right)      Hospital Course:   Aimee Palumbo is a 71 year old  female who was admitted after a fall at home and found to have closed displaced right midshaft femur fracture. She was admitted to Hospital Medicine and followed closely. Ortho inserted an intermedullary johan into the right femur 6/5/2022. Pain was managed per ortho. PT/OT were consulted for eval/Tx. Patient complained of left arm pain from her fall. Xray of left forearm and wrist obtained showed no fracture only soft tissue swelling. Patient experienced dyspnea with elevated heart rate night of 6/7/2022. O2 sats dropped into low 60s on room air. CTA performed demonstrated right upper lobe PE. Patient's supplemental oxygen demands increased to 4L. Patient started on IV heparin and APTT ordered. Repeat APTT found to be in therapeutic range.  Pulmonology consulted for further evaluation and treatment. Patient given one unit of blood following surgery for low H&H. US of BLE obtained negative for DVT. Patient remained on IV heparin until day of discharge. Patient switched to oral anticoagulation. PT/OT continued to work with patient and progressively advanced therapy. Patient accepted to [facility] for continued physical therapy. On day of discharge, patient dyspnea continues to improve with lower supplemental oxygen demands. Patient's leg pain well controlled on prn pain medication. Plan to discharge patient to [facility] with oral anticoagulation and prn pain medication. Patient verbalized understanding of discharge instructions and return precautions.     Physical Exam:  General- Patient alert and oriented x3 in NAD  HEENT- PERRLA, EOMI, OP clear, MMM  CV- Regular rate and rhythm, No Murmur/apollo/rubs  Resp- Lungs CTA Bilaterally, No increased WOB, currently on supplemental O2  GI- Non tender/non-distended, BS normoactive x4 quads, no HSM  Extrem- No cyanosis, clubbing, edema. Pulses 2+ and symmetric. Bruising noted to left wrist improving, right hip surgical incisions dressings CDI  Neuro- Strength 5/5 flexors/extensors, DTRs 2+ and symmetric, Intact sensation to light touch grossly       Goals of Care Treatment Preferences:  Code Status: Full Code      Consults:   Consults (From admission, onward)        Status Ordering Provider     Inpatient consult to Pulmonology  Once        Provider:  Rober De Anda MD    Completed DU HURST     Inpatient consult to Orthopedics  Once        Provider:  Choco Cristobal MD    Completed MICHAELA HANSON          No new Assessment & Plan notes have been filed under this hospital service since the last note was generated.  Service: Hospital Medicine    Final Active Diagnoses:    Diagnosis Date Noted POA    PRINCIPAL PROBLEM:  Closed fracture of shaft of right femur [S72.301A] 06/04/2022  Yes    Acute pulmonary embolism [I26.99] 06/07/2022 No    Acute hypoxemic respiratory failure [J96.01] 06/07/2022 Yes    ARLETH on CPAP [G47.33, Z99.89]  Not Applicable    Osteoporosis [M81.0]  Yes    Hypertension [I10]  Yes    Hyperlipidemia [E78.5]  Yes    Gastroesophageal reflux disease with esophagitis [K21.00]  Yes    Coronary artery disease [I25.10]  Yes    Anxiety [F41.9]  Yes    Obesity [E66.9] 04/29/2016 Yes    Depression [F32.A] 03/23/2016 Yes      Problems Resolved During this Admission:       Discharged Condition: fair    Disposition: Rehab Facility    Follow Up:   Follow-up Information     Amor Lamas Jr, MD Follow up in 3 day(s).    Specialty: Internal Medicine  Why: hospital follow up  Contact information:  [facility] [address]  [place] [address]  [phone]             Choco Cristobal MD Follow up in 2 week(s).    Specialties: Sports Medicine, Orthopedic Surgery  Why: hospital follow up  Contact information:  [address] [place] [address] [address]  [place] [address]  [phone]                       Patient Instructions:      Diet Adult Regular     Notify your health care provider if you experience any of the following:  temperature >100.4     Notify your health care provider if you experience any of the following:  persistent nausea and vomiting or diarrhea     Notify your health care provider if you experience any of the following:  severe uncontrolled pain     Notify your health care provider if you experience any of the following:  redness, tenderness, or signs of infection (pain, swelling, redness, odor or green/yellow discharge around incision site)     Notify your health care provider if you experience any of the following:  difficulty breathing or increased cough     Notify your health care provider if you experience any of the following:  persistent dizziness, light-headedness, or visual disturbances     Activity as tolerated       Significant Diagnostic Studies: Labs:   CMP    Recent Labs   Lab 06/09/22  1108      K 3.6      CO2 26   *   BUN 9   CREATININE 0.7   CALCIUM 9.2   PROT 6.4   ALBUMIN 2.5*   BILITOT 1.7*   ALKPHOS 66   AST 32   ALT 23   ANIONGAP 11   ESTGFRAFRICA >60   EGFRNONAA >60    and CBC   Recent Labs   Lab 06/09/22  0329   WBC 9.74   HGB 8.4*   HCT 24.1*   *       Pending Diagnostic Studies:     Procedure Component Value Units Date/Time    EKG 12-lead [[id number]] Collected: 06/07/22 1343    Order Status: Sent Lab Status: In process Updated: 06/08/22 0814    Narrative:      Test Reason : I26.99,    Vent. Rate : 096 BPM     Atrial Rate : 096 BPM     P-R Int : 140 ms          QRS Dur : 082 ms      QT Int : 348 ms       P-R-T Axes : 051 022 006 degrees     QTc Int : 439 ms    Normal sinus rhythm  Cannot rule out Inferior infarct ,age undetermined  Abnormal ECG  When compared with ECG of 04-JUN-2022 15:53,  Vent. rate has increased BY  36 BPM  Minimal criteria for Inferior infarct are now Present    Referred By: [email or web address]   SELF           Confirmed By:          Medications:  Reconciled Home Medications:      Medication List      START taking these medications    apixaban 5 mg Tab  Commonly known as: ELIQUIS  Take 2 tablets (10 mg total) by mouth 2 (two) times daily for 7 days, THEN 1 tablet (5 mg total) 2 (two) times daily.  Start taking on: June 9, 2022     oxyCODONE-acetaminophen  mg per tablet  Commonly known as: PERCOCET  Take 1 tablet by mouth every 6 (six) hours as needed for Pain.     senna-docusate 8.6-50 mg 8.6-50 mg per tablet  Commonly known as: PERICOLACE  Take 1 tablet by mouth 2 (two) times daily. for 7 days        CONTINUE taking these medications    aspirin 81 MG EC tablet  Commonly known as: ECOTRIN  Take 81 mg by mouth once daily.     CALCIUM 500 + D 500 mg-5 mcg (200 unit) per tablet  Generic drug: calcium-vitamin D3  Take 1 tablet by mouth 2 (two) times daily with meals.     ergocalciferol 50,000 unit Cap  Commonly known  as: ERGOCALCIFEROL  Take 50,000 Units by mouth every 30 days. TWICE MONTHLY     ezetimibe 10 mg tablet  Commonly known as: ZETIA  Take 10 mg by mouth once daily.     metoprolol succinate 25 MG 24 hr tablet  Commonly known as: TOPROL-XL  Take 1 tablet (25 mg total) by mouth 2 (two) times a day.     nitroGLYCERIN 0.4 MG SL tablet  Commonly known as: NITROSTAT  Place 0.4 mg under the tongue every 5 (five) minutes as needed for Chest pain.     ONE DAILY MULTIVITAMIN per tablet  Generic drug: multivitamin  Take 1 tablet by mouth 2 (two) times a day.     pantoprazole 40 MG tablet  Commonly known as: PROTONIX  TAKE 1 TABLET BY MOUTH EVERY DAY     rosuvastatin 40 MG Tab  Commonly known as: CRESTOR  Take 40 mg by mouth once daily.            Indwelling Lines/Drains at time of discharge:   Lines/Drains/Airways     None                 Time spent on the discharge of patient: 43 minutes         Sarwat Crocker PA-C  Department of Hospital Medicine  [facility]

## 2022-06-14 ENCOUNTER — TELEPHONE (OUTPATIENT)
Dept: MEDSURG UNIT | Facility: HOSPITAL | Age: 72
End: 2022-06-14
Payer: MEDICARE

## 2022-06-15 ENCOUNTER — HOSPITAL ENCOUNTER (OUTPATIENT)
Dept: RADIOLOGY | Facility: HOSPITAL | Age: 72
Discharge: HOME | End: 2022-06-15
Attending: PHYSICAL MEDICINE & REHABILITATION
Payer: MEDICARE

## 2022-06-15 PROCEDURE — 71100 X-RAY EXAM RIBS UNI 2 VIEWS: CPT | Mod: 26,LT,, | Performed by: RADIOLOGY

## 2022-06-15 PROCEDURE — 71100 XR RIBS 2 VIEW LEFT: ICD-10-PCS | Mod: 26,LT,, | Performed by: RADIOLOGY

## 2022-06-17 ENCOUNTER — TELEPHONE (OUTPATIENT)
Dept: ORTHOPEDICS | Facility: CLINIC | Age: 72
End: 2022-06-17
Payer: MEDICARE

## 2022-06-17 NOTE — TELEPHONE ENCOUNTER
----- Message from Bobby Garrison sent at 6/17/2022 10:17 AM CDT -----  Regarding: pt can not come to the appt on Mon, call   Contact: pt   pt can not come to the appt on Mon, call

## 2022-06-17 NOTE — TELEPHONE ENCOUNTER
----- Message from Bobby Garrison sent at 6/17/2022  9:22 AM CDT -----  Regarding: needs post op, call pt   Contact: pt   needs post op, call pt

## 2022-06-21 DIAGNOSIS — S72.321A DISPLACED TRANSVERSE FRACTURE OF SHAFT OF RIGHT FEMUR, INITIAL ENCOUNTER FOR CLOSED FRACTURE: Primary | ICD-10-CM

## 2022-06-22 ENCOUNTER — CLINICAL SUPPORT (OUTPATIENT)
Dept: REHABILITATION | Facility: HOSPITAL | Age: 72
End: 2022-06-22
Payer: MEDICARE

## 2022-06-22 DIAGNOSIS — M25.661 DECREASED RANGE OF MOTION OF RIGHT KNEE: ICD-10-CM

## 2022-06-22 DIAGNOSIS — S72.321A: Primary | ICD-10-CM

## 2022-06-22 DIAGNOSIS — M79.651 ACUTE PAIN OF RIGHT THIGH: ICD-10-CM

## 2022-06-22 PROCEDURE — 97110 THERAPEUTIC EXERCISES: CPT | Mod: PN

## 2022-06-22 PROCEDURE — 97161 PT EVAL LOW COMPLEX 20 MIN: CPT | Mod: PN

## 2022-06-24 ENCOUNTER — TELEPHONE (OUTPATIENT)
Dept: ORTHOPEDICS | Facility: CLINIC | Age: 72
End: 2022-06-24
Payer: MEDICARE

## 2022-06-24 NOTE — TELEPHONE ENCOUNTER
Called and left pt a VM stating she can use stockings as needed that she does not have to wear them continuously

## 2022-06-24 NOTE — TELEPHONE ENCOUNTER
----- Message from Roderick Romo MA sent at 6/24/2022 11:04 AM CDT -----  Contact: Aimee Palumbo  [phone], Patient would like to know how long does she have to wear her stockings?

## 2022-06-26 PROBLEM — M25.661 DECREASED RANGE OF MOTION OF RIGHT KNEE: Status: ACTIVE | Noted: 2022-06-26

## 2022-06-26 PROBLEM — M79.651 ACUTE PAIN OF RIGHT THIGH: Status: ACTIVE | Noted: 2022-06-26

## 2022-06-27 ENCOUNTER — CLINICAL SUPPORT (OUTPATIENT)
Dept: REHABILITATION | Facility: HOSPITAL | Age: 72
End: 2022-06-27
Payer: MEDICARE

## 2022-06-27 DIAGNOSIS — M79.651 ACUTE PAIN OF RIGHT THIGH: Primary | ICD-10-CM

## 2022-06-27 DIAGNOSIS — M25.661 DECREASED RANGE OF MOTION OF RIGHT KNEE: ICD-10-CM

## 2022-06-27 PROCEDURE — 97140 MANUAL THERAPY 1/> REGIONS: CPT | Mod: PN,CQ

## 2022-06-27 PROCEDURE — 97110 THERAPEUTIC EXERCISES: CPT | Mod: PN,CQ

## 2022-06-27 NOTE — PLAN OF CARE
[facility]   Physical Therapy Initial Evaluation     Date: 6/22/2022   Name: Aimee Palumbo  Clinic Number: [id number]    Therapy Diagnosis:   Encounter Diagnoses   Name Primary?    Acute pain of right thigh     Decreased range of motion of right knee      Physician: Brittany, Provider    Physician Orders: PT Eval and Treat   Medical Diagnosis from Referral: S72.321A (ICD-10-CM) - Displaced transverse fracture of shaft of right femur, initial encounter for closed fracture  Evaluation Date: 6/22/2022  Authorization Period Expiration: 12/31/22  Plan of Care Expiration: 10/20/22  Progress Note Due: 7/20/22  Visit # / Visits authorized: 1/ 1   FOTO: 55% limitation  FOTO 1st follow up:  FOTO 2nd follow up:    Precautions: Standard and Fall osteoporosis, 2 stents in 2009, blood thinner,     Time In: 10:01  Time Out: 11:00  Total Appointment Time (timed & untimed codes): 59 minutes      * Closed fracture of shaft of right femur, unspecified fracture morphology, initial encounter [S72.301A]    SUBJECTIVE     Date of onset: 6/5/22    History of current condition - Aimee reports: stepping over out of a house tripping and fractured her femur, left wrist injury, and black eye. Went to rehab in inpatient facility from 9th to yesterday. Feels better with transferring, ambulating short distances, and getting in and out of the car. She is having minimal to no pain and taking occasional OTC medication. Right knee has OA and doctor wanted to do a knee replacement and that is giving her the most pain. She has been ambulating with her walker and is WBAT.     Falls: 6/5/22    Imaging, x-ray - Impression:     Oblique fracture of the midshaft right femur with side to side apposition and mild bayonet shortening.  DJD at the knee joint    Prior Therapy: none  Social History: staying with friend and has 4 steps up to house   Occupation: retired  Prior Level of Function: independent  Current Level of  Function: ambulation with rolling walker, decreased endurance    Pain:  Current 2/10, worst 8/10, best 0/10   Location: right knee  and upper legs .   Description: Aching and Deep  Aggravating Factors: Standing, Bending, Extension and Flexing  Easing Factors: pain medication, ice and rest    Patients goals: ambulating independently, going up the stairs, and potentially back to using the lawn .      Medical History:   Past Medical History:   Diagnosis Date    Anxiety     Constipation     Coronary artery disease     Esophageal stricture     GERD (gastroesophageal reflux disease)     indefinite PPI    Hyperlipidemia     Hypertension     Mitral valve prolapse     ARLETH on CPAP     Osteoporosis        Surgical History:   Aimee Palumbo  has a past surgical history that includes Appendectomy; Esophagogastroduodenoscopy; cardiovascular stress testing (2017); echocardiography (2017); Tonsillectomy; Tubal ligation; Breast surgery (2010); Cholecystectomy (2017); Coronary stent placement (); Breast biopsy; Breast cyst aspiration; Total Reduction Mammoplasty; and Intramedullary rodding of femur (Right, 2022).    Medications:   Aimee has a current medication list which includes the following prescription(s): apixaban, aspirin, calcium-vitamin d3, ergocalciferol, ezetimibe, metoprolol succinate, multivitamin, nitroglycerin, pantoprazole, and rosuvastatin.    Allergies:   Review of patient's allergies indicates:  No Known Allergies       OBJECTIVE     Observation: ambulation with rolling walker, decreased WB    TU seconds with rolling walker    2 minute walk test: 100 feet with rolling walker    Range of Motion (Passive):   Knee Right  Left    Flexion 80 125   Extension 0 0     Range of Motion (Active):   Knee Right  Left    Flexion 80 125   Extension 0 0       Lower Extremity Strength  Right LE  Left LE    Quadriceps: 3-/5 Quadriceps: 4/5   Hamstrings: 4/5 Hamstrings: 4/5   Hip  "flexion (seated): NT Hip flexion (seated): 4/5   Hip extension:  NT Hip extension: NT   PGM: NT PGM:  NT   Hip ER:  NT Hip ER:  NT   Hip IR: NT Hip IR: NT     Joint Mobility: hypomobile patella and tibiofemoral joint     Palpation: no major TTP    Edema: mild edema on right LE      Limitation/Restriction for FOTO knee Survey    Therapist reviewed FOTO scores for Aimee Palumbo on 6/22/2022.   FOTO documents entered into live[employer] - see Media section.    Limitation Score: 55%         TREATMENT     Total Treatment time (time-based codes) separate from Evaluation: 15 minutes      Aimee received the treatments listed below:      therapeutic exercises to develop strength and ROM for 15 minutes including:  Quad sets with towel 20x5"  Supine clams with red TB 20x  Heel slides 20x  glute sets 10x10"      PATIENT EDUCATION AND HOME EXERCISES     Education provided:   - HEP  - importance of quad strength    Written Home Exercises Provided: yes. Exercises were reviewed and Aimee was able to demonstrate them prior to the end of the session.  Aimee demonstrated good  understanding of the education provided. See EMR under Patient Instructions for exercises provided during therapy sessions.    ASSESSMENT     Aimee is a 71 y.o. female referred to outpatient Physical Therapy with a medical diagnosis of Displaced transverse fracture of shaft of right femur, initial encounter for closed fracture. Patient presents with decreased knee/hip range of motion, decreased LE strength, decreased quad strength, abnormal gait, and limitations that are affecting her functional mobility and everyday activities.     Patient prognosis is Good.   Patient will benefit from skilled outpatient Physical Therapy to address the deficits stated above and in the chart below, provide patient /family education, and to maximize patientt's level of independence.     Plan of care discussed with patient: Yes  Patient's spiritual, cultural and educational " needs considered and patient is agreeable to the plan of care and goals as stated below:     Anticipated Barriers for therapy: surgery    Medical Necessity is demonstrated by the following  History  Co-morbidities and personal factors that may impact the plan of care Co-morbidities:   Anxiety   Constipation   Coronary artery disease   Esophageal stricture   GERD (gastroesophageal reflux disease)   indefinite PPI   Hyperlipidemia   Hypertension   Mitral valve prolapse   ARLETH on CPAP   Osteoporosis       Personal Factors:   age     high   Examination  Body Structures and Functions, activity limitations and participation restrictions that may impact the plan of care Body Regions:   back  lower extremities    Body Systems:    gross symmetry  ROM  strength  gross coordinated movement  balance  gait  motor control  motor learning    Participation Restrictions:   transportation    Activity limitations:   Learning and applying knowledge  no deficits    General Tasks and Commands  no deficits    Communication  no deficits    Mobility  walking    Self care  no deficits    Domestic Life  no deficits    Interactions/Relationships  no deficits    Life Areas  no deficits    Community and Social Life  no deficits         high   Clinical Presentation stable and uncomplicated low   Decision Making/ Complexity Score: low     GOALS: Short Term Goals:  4-6 weeks  1.Report decreased leg pain  < / =  2/10  to increase tolerance for ambulation  2. Increase knee ROM to full passive in order to be able to perform ADLs without difficulty.  3. Increase strength by 1/3 MMT grade in quad  to increase tolerance for ADL and work activities.  4. Pt to tolerate HEP to improve ROM and independence with ADL's    Long Term Goals: 12-16 weeks  1.Report decreased leg pain < / = 1/10  to increase tolerance for ambulation  2.Patient goal: return to independence, going up stairs, and gardening activities  3.Increase strength to >/= 4+/5 in quad  to increase  tolerance for ADL and work activities.  4. Pt will report at CJ level (20-40% impaired) on FOTO knee to demonstrate increase in LE function with every day tasks.   5. Pt will improve TUG score without AD to <14 in order to show decreased falls risk.       PLAN   Plan of care Certification: 6/22/2022 to 10/20/22.    Outpatient Physical Therapy 2 times weekly for 16 weeks to include the following interventions: Electrical Stimulation NMES, Manual Therapy, Moist Heat/ Ice, Neuromuscular Re-ed, Patient Education, Self Care, Therapeutic Activities and Therapeutic Exercise.     Russ Sultana, PT      I CERTIFY THE NEED FOR THESE SERVICES FURNISHED UNDER THIS PLAN OF TREATMENT AND WHILE UNDER MY CARE   Physician's comments:     Physician's Signature: ___________________________________________________

## 2022-06-27 NOTE — PROGRESS NOTES
"[facility]   Physical Therapy Treatment Note     Name: Aimee Palumbo  Clinic Number: [id number]    Therapy Diagnosis:   Encounter Diagnoses   Name Primary?    Acute pain of right thigh Yes    Decreased range of motion of right knee      Physician: Atilio Leary MD    Visit Date: 6/27/2022    Physician Orders: PT Eval and Treat   Medical Diagnosis from Referral: S72.321A (ICD-10-CM) - Displaced transverse fracture of shaft of right femur, initial encounter for closed fracture  Evaluation Date: 6/22/2022  Authorization Period Expiration: 12/31/22  Plan of Care Expiration: 10/20/22  Progress Note Due: 7/20/22  Visit # / Visits authorized: 1 / 20   FOTO: 55% limitation  FOTO 1st follow up:  FOTO 2nd follow up:     Precautions: Standard and Fall osteoporosis, 2 stents in 2009, blood thinner,           PTA Visit #: 1/5     FOTO first follow up:   FOTO second follow up:     Time In: 0730  Time Out: 0830  Total Billable Time: 60 minutes    SUBJECTIVE     Pt reports: She has been doing well and pain levels are improving.  She was compliant with home exercise program.  Response to previous treatment: Positive  Functional change: First treat after eval     Pain: 3/10  Location: right Leg     OBJECTIVE     Objective Measures updated at progress report unless specified.     Treatment     Aimee received the treatments listed below:      therapeutic exercises to develop strength and ROM for 48 minutes including:  Quad sets with towel 20x5"  Supine clams with red TB 20x  Active heel slides, x 20  glute sets 3 x 10 5"  Bridging, 2 x 10   Straight leg raise, 3 x 10   Standing hip abduction 2 x 10   Standing hip extension 2 x 10   Standing Terminal Knee Extension, 3 x 10 Red theraband   Step ups on 6 inch step, 2 x 10     Next:  -Weight shifting lateral and fwd/bckwd    neuromuscular re-education activities to improve: muscle control and activation  for 12  minutes. The following activities " were included:  NMES:   -quad sets, x 8 min 10 on / 10 off    -Straight leg raise, x3 min 5 on / 5 off       Patient Education and Home Exercises     Home Exercises Provided and Patient Education Provided     Education provided:   - Continue HEP     Written Home Exercises Provided: Patient instructed to cont prior HEP. Exercises were reviewed and Aimee was able to demonstrate them prior to the end of the session.  Aimee demonstrated good  understanding of the education provided. See EMR under Patient Instructions for exercises provided during therapy sessions    ASSESSMENT     Patient ambulated into clinic independently with RW demo decreased anna and step length B . Patient unable to utilize step to get onto high mat due to being fear avoidant with WB through R lower extremity.     Aimee Is progressing well towards her goals.   Pt prognosis is Good.     Pt will continue to benefit from skilled outpatient physical therapy to address the deficits listed in the problem list box on initial evaluation, provide pt/family education and to maximize pt's level of independence in the home and community environment.     Pt's spiritual, cultural and educational needs considered and pt agreeable to plan of care and goals.     Anticipated barriers to physical therapy: surgery    GOALS: Short Term Goals:  4-6 weeks  1.Report decreased leg pain  < / =  2/10  to increase tolerance for ambulation  2. Increase knee ROM to full passive in order to be able to perform ADLs without difficulty.  3. Increase strength by 1/3 MMT grade in quad  to increase tolerance for ADL and work activities.  4. Pt to tolerate HEP to improve ROM and independence with ADL's     Long Term Goals: 12-16 weeks  1.Report decreased leg pain < / = 1/10  to increase tolerance for ambulation  2.Patient goal: return to independence, going up stairs, and gardening activities  3.Increase strength to >/= 4+/5 in quad  to increase tolerance for ADL and work  activities.  4. Pt will report at CJ level (20-40% impaired) on FOTO knee to demonstrate increase in LE function with every day tasks.   5. Pt will improve TUG score without AD to <14 in order to show decreased falls risk.          PLAN     Continue to treat and progress per POC and pt tolerance       Johnnie Gore, PTA

## 2022-06-29 ENCOUNTER — CLINICAL SUPPORT (OUTPATIENT)
Dept: REHABILITATION | Facility: HOSPITAL | Age: 72
End: 2022-06-29
Payer: MEDICARE

## 2022-06-29 DIAGNOSIS — M25.661 DECREASED RANGE OF MOTION OF RIGHT KNEE: ICD-10-CM

## 2022-06-29 DIAGNOSIS — S72.331A CLOSED DISPLACED OBLIQUE FRACTURE OF SHAFT OF RIGHT FEMUR, INITIAL ENCOUNTER: Primary | ICD-10-CM

## 2022-06-29 DIAGNOSIS — M79.651 ACUTE PAIN OF RIGHT THIGH: Primary | ICD-10-CM

## 2022-06-29 PROCEDURE — 97110 THERAPEUTIC EXERCISES: CPT | Mod: PN

## 2022-06-29 PROCEDURE — 97112 NEUROMUSCULAR REEDUCATION: CPT | Mod: PN

## 2022-06-29 NOTE — PROGRESS NOTES
"  [facility]   Physical Therapy Treatment Note     Name: Aimee Palumbo  Clinic Number: [id number]    Therapy Diagnosis:   Encounter Diagnoses   Name Primary?    Acute pain of right thigh Yes    Decreased range of motion of right knee      Physician: Atilio Leary MD    Visit Date: 6/29/2022    Physician Orders: PT Eval and Treat   Medical Diagnosis from Referral: S72.321A (ICD-10-CM) - Displaced transverse fracture of shaft of right femur, initial encounter for closed fracture  Evaluation Date: 6/22/2022  Authorization Period Expiration: 12/31/22  Plan of Care Expiration: 10/20/22  Progress Note Due: 7/20/22  Visit # / Visits authorized: 2 / 20   FOTO: 55% limitation  FOTO 1st follow up:  FOTO 2nd follow up:     Precautions: Standard and Fall osteoporosis, 2 stents in 2009, blood thinner,           PTA Visit #: 0/5     FOTO first follow up:   FOTO second follow up:     Time In: 8:00  Time Out: 9:00  Total Billable Time: 25 minutes    SUBJECTIVE     Pt reports: doing better today but might have to start living at home again where she has 4 steps. Scared to try stairs.     She was compliant with home exercise program.  Response to previous treatment: Positive  Functional change: First treat after eval     Pain: 3/10  Location: right Leg     OBJECTIVE     Objective Measures updated at progress report unless specified.     Treatment     Aimee received the treatments listed below:      therapeutic exercises to develop strength and ROM for 44 minutes including:  Quad sets with towel 20x5"  Supine clams with red TB 20x  Active heel slides, x 20  glute sets 3 x 10 5"  Bridging, 2 x 10   Straight leg raise, 3 x 10     Stair/step training 4 times  Pt education     Not today:  Standing hip abduction 2 x 10   Standing hip extension 2 x 10   Standing Terminal Knee Extension, 3 x 10 Red theraband   Step ups on 6 inch step, 2 x 10     neuromuscular re-education activities to improve: muscle " control and activation  for 15 minutes. The following activities were included:  NMES:   -quad sets, x 8 min 10 on / 10 off    -Straight leg raise, x3 min 5 on / 5 off       Patient Education and Home Exercises     Home Exercises Provided and Patient Education Provided     Education provided:   - Continue HEP     Written Home Exercises Provided: Patient instructed to cont prior HEP. Exercises were reviewed and Aimee was able to demonstrate them prior to the end of the session.  Aimee demonstrated good  understanding of the education provided. See EMR under Patient Instructions for exercises provided during therapy sessions    ASSESSMENT     Patient was potentially going to her house today and wanted to practice stair training since she had 4 steps to enter. Patient was apprehensive and emotional at first but was able to reduce fear and able to ambulate stairs. She perform ascent > descent and had some pain/fatigue as progression. Educated on proper mechanics and hand placement for rolling walker and for railing. Improving with quad and ambulation overall. Continue to progress as tolerated.     Aimee Is progressing well towards her goals.   Pt prognosis is Good.     Pt will continue to benefit from skilled outpatient physical therapy to address the deficits listed in the problem list box on initial evaluation, provide pt/family education and to maximize pt's level of independence in the home and community environment.     Pt's spiritual, cultural and educational needs considered and pt agreeable to plan of care and goals.     Anticipated barriers to physical therapy: surgery    GOALS: Short Term Goals:  4-6 weeks  1.Report decreased leg pain  < / =  2/10  to increase tolerance for ambulation  2. Increase knee ROM to full passive in order to be able to perform ADLs without difficulty.  3. Increase strength by 1/3 MMT grade in quad  to increase tolerance for ADL and work activities.  4. Pt to tolerate HEP to  improve ROM and independence with ADL's     Long Term Goals: 12-16 weeks  1.Report decreased leg pain < / = 1/10  to increase tolerance for ambulation  2.Patient goal: return to independence, going up stairs, and gardening activities  3.Increase strength to >/= 4+/5 in quad  to increase tolerance for ADL and work activities.  4. Pt will report at CJ level (20-40% impaired) on FOTO knee to demonstrate increase in LE function with every day tasks.   5. Pt will improve TUG score without AD to <14 in order to show decreased falls risk.          PLAN     Continue to treat and progress per POC and pt tolerance       Russ Sultana, PT

## 2022-07-05 ENCOUNTER — CLINICAL SUPPORT (OUTPATIENT)
Dept: REHABILITATION | Facility: HOSPITAL | Age: 72
End: 2022-07-05
Payer: MEDICARE

## 2022-07-05 DIAGNOSIS — M79.651 ACUTE PAIN OF RIGHT THIGH: Primary | ICD-10-CM

## 2022-07-05 DIAGNOSIS — M25.661 DECREASED RANGE OF MOTION OF RIGHT KNEE: ICD-10-CM

## 2022-07-05 PROCEDURE — 97116 GAIT TRAINING THERAPY: CPT | Mod: PN,CQ

## 2022-07-05 PROCEDURE — 97110 THERAPEUTIC EXERCISES: CPT | Mod: PN

## 2022-07-05 PROCEDURE — 97530 THERAPEUTIC ACTIVITIES: CPT | Mod: PN,CQ

## 2022-07-05 NOTE — PROGRESS NOTES
"  [facility]   Physical Therapy Treatment Note     Name: Aimee Palumbo  Clinic Number: [id number]    Therapy Diagnosis:   Encounter Diagnoses   Name Primary?    Acute pain of right thigh Yes    Decreased range of motion of right knee      Physician: Atilio Leary MD    Visit Date: 7/5/2022    Physician Orders: PT Eval and Treat   Medical Diagnosis from Referral: S72.321A (ICD-10-CM) - Displaced transverse fracture of shaft of right femur, initial encounter for closed fracture  Evaluation Date: 6/22/2022  Authorization Period Expiration: 12/31/22  Plan of Care Expiration: 10/20/22  Progress Note Due: 7/20/22  Visit # / Visits authorized: 3/ 20   FOTO: 55% limitation  FOTO 1st follow up:  FOTO 2nd follow up:     Precautions: Standard and Fall osteoporosis, 2 stents in 2009, blood thinner,           PTA Visit #: 0/5     FOTO first follow up:   FOTO second follow up:     Time In: 8:00  Time Out: 9:30  Total Billable Time: 15 minutes    SUBJECTIVE     Pt reports: doing much better from last session. Able to go up and down the stairs at her house.     She was compliant with home exercise program.  Response to previous treatment: Positive  Functional change: First treat after eval     Pain: 3/10  Location: right Leg     OBJECTIVE     Objective Measures updated at progress report unless specified.     Treatment     Aimee received the treatments listed below:      therapeutic exercises to develop strength and ROM for 28 minutes including:  Quad sets with towel 30x5"  Straight leg raise, 3 x 10   S/l clams with red TB 30x5"  Active heel slides, x 20  Bridging, 2 x 10   Pt education     Not today:  Standing hip abduction 2 x 10   Standing hip extension 2 x 10   Standing Terminal Knee Extension, 3 x 10 Red theraband   Step ups on 6 inch step, 2 x 10     neuromuscular re-education activities to improve: muscle control and activation  for 0 minutes. The following activities were " included:  NMES:   -quad sets, x 8 min 10 on / 10 off    -Straight leg raise, x3 min 5 on / 5 off       Patient Education and Home Exercises     Home Exercises Provided and Patient Education Provided     Education provided:   - Continue HEP     Written Home Exercises Provided: Patient instructed to cont prior HEP. Exercises were reviewed and Aimee was able to demonstrate them prior to the end of the session.  Aimee demonstrated good  understanding of the education provided. See EMR under Patient Instructions for exercises provided during therapy sessions    ASSESSMENT     Patient presented into the clinic with improved knee range of motion and quad control. Able to perform SLR without NMES and no quad lag. Continue to progress gait and strengthening as tolerated. PTA Rita Cobb assisted with the rest of treatment.     Aimee Is progressing well towards her goals.   Pt prognosis is Good.     Pt will continue to benefit from skilled outpatient physical therapy to address the deficits listed in the problem list box on initial evaluation, provide pt/family education and to maximize pt's level of independence in the home and community environment.     Pt's spiritual, cultural and educational needs considered and pt agreeable to plan of care and goals.     Anticipated barriers to physical therapy: surgery    GOALS: Short Term Goals:  4-6 weeks  1.Report decreased leg pain  < / =  2/10  to increase tolerance for ambulation  2. Increase knee ROM to full passive in order to be able to perform ADLs without difficulty.  3. Increase strength by 1/3 MMT grade in quad  to increase tolerance for ADL and work activities.  4. Pt to tolerate HEP to improve ROM and independence with ADL's     Long Term Goals: 12-16 weeks  1.Report decreased leg pain < / = 1/10  to increase tolerance for ambulation  2.Patient goal: return to independence, going up stairs, and gardening activities  3.Increase strength to >/= 4+/5 in quad   to increase tolerance for ADL and work activities.  4. Pt will report at CJ level (20-40% impaired) on FOTO knee to demonstrate increase in LE function with every day tasks.   5. Pt will improve TUG score without AD to <14 in order to show decreased falls risk.          PLAN     Continue to treat and progress per POC and pt tolerance       Russ Sultana, PT   Co-Treatment Rita Cobb PTA

## 2022-07-05 NOTE — PROGRESS NOTES
[facility]   Physical Therapy Treatment Note     Name: Aimee Palumbo  Clinic Number: [id number]    Therapy Diagnosis:   Encounter Diagnoses   Name Primary?    Acute pain of right thigh Yes    Decreased range of motion of right knee      Physician: Atilio Leary MD    Visit Date: 7/5/2022    Physician Orders: PT Eval and Treat   Medical Diagnosis from Referral: S72.321A (ICD-10-CM) - Displaced transverse fracture of shaft of right femur, initial encounter for closed fracture  Evaluation Date: 6/22/2022  Authorization Period Expiration: 12/31/22  Plan of Care Expiration: 10/20/22  Progress Note Due: 7/20/22  Visit # / Visits authorized: 3 / 20   FOTO: 55% limitation  FOTO 1st follow up:  FOTO 2nd follow up:     Precautions: Standard and Fall osteoporosis, 2 stents in 2009, blood thinner,           PTA Visit #: 0/5     FOTO first follow up:   FOTO second follow up:     Time In: 8:30  Time Out: 9:00  Total Billable Time: 30 minutes    SUBJECTIVE     Pt reports: doing better today but might have to start living at home again where she has 4 steps. Scared to try stairs.     She was compliant with home exercise program.  Response to previous treatment: Positive  Functional change: First treat after eval     Pain: 3/10  Location: right Leg     OBJECTIVE     Objective Measures updated at progress report unless specified.     Treatment     Aimee received the treatments listed below:       Aimee participated in gait training to improve functional mobility and safety for 15  minutes, including:    Below performed as pre gait to improve tolerance to weight bearing:  Standing weight shifts x 1 minute  Standing tandem stance bilaterally 2 x 1 minute  Alternating foot lifts x 20 repetitions   Standing hip extension x 10 repetitions   Standing hip abduction x 10 repetitions     200 ft with Rolling walker on level surface with focus on increasing weight bearing through right lower  extremity add promoting more reciprocal gait pattern      Aimee participated in dynamic functional therapeutic activities to improve functional performance for 15 minutes, including:    Stairs 3 x 4 steps with bilateral upper extremity support  Step to gait pattern  Sit to stands without upper extremity support and contact guard assistance x 5 repetitions     Education for proper car transfer for higher vehicles     Patient Education and Home Exercises     Home Exercises Provided and Patient Education Provided     Education provided:   - Continue HEP     Written Home Exercises Provided: Patient instructed to cont prior HEP. Exercises were reviewed and Aimee was able to demonstrate them prior to the end of the session.  Aimee demonstrated good  understanding of the education provided. See EMR under Patient Instructions for exercises provided during therapy sessions    ASSESSMENT     PTA treated patient for second half of her treatment session. We focused on improving her ability to trust her right lower extremity and increase tolerance to weight bearing. Performed pre gait activities as indicated above with improvement noted. By third set of stairs she displayed improved confidence.  Was also able to incorporate sit to stands without upper extremity support increasing the usage of her right lower extremity. Patient will continue to benefit from skilled Physical Therapy to improve right lower extremity strength to allow her to ambulate with least resistive assistive device without pain or limitations.     Aimee Is progressing well towards her goals.   Pt prognosis is Good.     Pt will continue to benefit from skilled outpatient physical therapy to address the deficits listed in the problem list box on initial evaluation, provide pt/family education and to maximize pt's level of independence in the home and community environment.     Pt's spiritual, cultural and educational needs considered and pt agreeable to  plan of care and goals.     Anticipated barriers to physical therapy: surgery    GOALS: Short Term Goals:  4-6 weeks In progress 7/5/2022  1.Report decreased leg pain  < / =  2/10  to increase tolerance for ambulation  2. Increase knee ROM to full passive in order to be able to perform ADLs without difficulty.  3. Increase strength by 1/3 MMT grade in quad  to increase tolerance for ADL and work activities.  4. Pt to tolerate HEP to improve ROM and independence with ADL's     Long Term Goals: 12-16 weeks In progress 7/5/2022  1.Report decreased leg pain < / = 1/10  to increase tolerance for ambulation  2.Patient goal: return to independence, going up stairs, and gardening activities  3.Increase strength to >/= 4+/5 in quad  to increase tolerance for ADL and work activities.  4. Pt will report at CJ level (20-40% impaired) on FOTO knee to demonstrate increase in LE function with every day tasks.   5. Pt will improve TUG score without AD to <14 in order to show decreased falls risk.          PLAN     Continue to treat and progress per POC and pt tolerance       Rita Cobb, PTA

## 2022-07-07 ENCOUNTER — CLINICAL SUPPORT (OUTPATIENT)
Dept: REHABILITATION | Facility: HOSPITAL | Age: 72
End: 2022-07-07
Payer: MEDICARE

## 2022-07-07 ENCOUNTER — HOSPITAL ENCOUNTER (OUTPATIENT)
Dept: RADIOLOGY | Facility: HOSPITAL | Age: 72
Discharge: HOME | End: 2022-07-07
Attending: ORTHOPAEDIC SURGERY
Payer: MEDICARE

## 2022-07-07 ENCOUNTER — OFFICE VISIT (OUTPATIENT)
Dept: ORTHOPEDICS | Facility: CLINIC | Age: 72
End: 2022-07-07
Payer: MEDICARE

## 2022-07-07 VITALS — BODY MASS INDEX: 36.29 KG/M2 | WEIGHT: 180 LBS | RESPIRATION RATE: 18 BRPM | HEIGHT: 59 IN

## 2022-07-07 DIAGNOSIS — S72.331A CLOSED DISPLACED OBLIQUE FRACTURE OF SHAFT OF RIGHT FEMUR, INITIAL ENCOUNTER: ICD-10-CM

## 2022-07-07 DIAGNOSIS — M25.661 DECREASED RANGE OF MOTION OF RIGHT KNEE: ICD-10-CM

## 2022-07-07 DIAGNOSIS — M79.651 ACUTE PAIN OF RIGHT THIGH: Primary | ICD-10-CM

## 2022-07-07 DIAGNOSIS — S72.331D CLOSED DISPLACED OBLIQUE FRACTURE OF SHAFT OF RIGHT FEMUR WITH ROUTINE HEALING, SUBSEQUENT ENCOUNTER: Primary | ICD-10-CM

## 2022-07-07 PROCEDURE — 73552 X-RAY EXAM OF FEMUR 2/>: CPT | Mod: 26,RT,, | Performed by: RADIOLOGY

## 2022-07-07 PROCEDURE — 73552 X-RAY EXAM OF FEMUR 2/>: CPT | Mod: TC,FY,RT

## 2022-07-07 PROCEDURE — 99024 POSTOP FOLLOW-UP VISIT: CPT | Mod: POP,,, | Performed by: ORTHOPAEDIC SURGERY

## 2022-07-07 PROCEDURE — 97110 THERAPEUTIC EXERCISES: CPT | Mod: PN

## 2022-07-07 PROCEDURE — 99999 PR PBB SHADOW E&M-EST. PATIENT-LVL II: ICD-10-PCS | Mod: PBBFAC,,, | Performed by: ORTHOPAEDIC SURGERY

## 2022-07-07 PROCEDURE — 97112 NEUROMUSCULAR REEDUCATION: CPT | Mod: PN

## 2022-07-07 PROCEDURE — 73552 XR FEMUR 2 VIEW RIGHT: ICD-10-PCS | Mod: 26,RT,, | Performed by: RADIOLOGY

## 2022-07-07 PROCEDURE — 99024 PR POST-OP FOLLOW-UP VISIT: ICD-10-PCS | Mod: POP,,, | Performed by: ORTHOPAEDIC SURGERY

## 2022-07-07 PROCEDURE — 99212 OFFICE O/P EST SF 10 MIN: CPT | Mod: PBBFAC,PN | Performed by: ORTHOPAEDIC SURGERY

## 2022-07-07 PROCEDURE — 99999 PR PBB SHADOW E&M-EST. PATIENT-LVL II: CPT | Mod: PBBFAC,,, | Performed by: ORTHOPAEDIC SURGERY

## 2022-07-07 NOTE — PROGRESS NOTES
Past Medical History:   Diagnosis Date    Anxiety     Constipation     Coronary artery disease     Esophageal stricture     GERD (gastroesophageal reflux disease)     indefinite PPI    Hyperlipidemia     Hypertension     Mitral valve prolapse     ARLETH on CPAP     Osteoporosis        Past Surgical History:   Procedure Laterality Date    APPENDECTOMY      BREAST BIOPSY      BREAST CYST ASPIRATION      BREAST SURGERY  12/2010    Reduction    cardiovascular stress testing  09/19/2017    CHOLECYSTECTOMY  12/13/2017    Laparoscopic- Dr Lai    CORONARY STENT PLACEMENT  2007    echocardiography  09/19/2017    ESOPHAGOGASTRODUODENOSCOPY      INTRAMEDULLARY RODDING OF FEMUR Right 6/5/2022    Procedure: INSERTION, INTRAMEDULLARY JOHAN, FEMUR;  Surgeon: Choco Cristobal MD;  Location: [facility];  Service: Orthopedics;  Laterality: Right;    TONSILLECTOMY      TOTAL REDUCTION MAMMOPLASTY      TUBAL LIGATION         Current Outpatient Medications   Medication Sig    apixaban (ELIQUIS) 5 mg Tab Take 2 tablets (10 mg total) by mouth 2 (two) times daily for 7 days, THEN 1 tablet (5 mg total) 2 (two) times daily.    aspirin (ECOTRIN) 81 MG EC tablet Take 81 mg by mouth once daily.    calcium-vitamin D3 (OS-YAHIR 500 + D3) 500 mg-5 mcg (200 unit) per tablet Take 1 tablet by mouth 2 (two) times daily with meals.     ergocalciferol (ERGOCALCIFEROL) 50,000 unit Cap Take 50,000 Units by mouth every 30 days. TWICE MONTHLY    ezetimibe (ZETIA) 10 mg tablet Take 10 mg by mouth once daily.    metoprolol succinate (TOPROL-XL) 25 MG 24 hr tablet Take 1 tablet (25 mg total) by mouth 2 (two) times a day.    multivitamin (THERAGRAN) per tablet Take 1 tablet by mouth 2 (two) times a day.     nitroGLYCERIN (NITROSTAT) 0.4 MG SL tablet Place 0.4 mg under the tongue every 5 (five) minutes as needed for Chest pain.    pantoprazole (PROTONIX) 40 MG tablet TAKE 1 TABLET BY MOUTH EVERY DAY (Patient taking differently:  Take 40 mg by mouth once daily.)    rosuvastatin (CRESTOR) 40 MG Tab Take 40 mg by mouth once daily.      No current facility-administered medications for this visit.       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  Patient underwent IM nailing.  She is doing well.  Pain is a 2/10.  Walking with just a walker.      Review of Systems:    Musculoskeletal:  See HPI        Physical Examination:    Vital Signs:    Vitals:    07/07/22 1042   Resp: 18       Body mass index is 36.36 kg/m².    This a well-developed, well nourished patient in no acute distress.  They are alert and oriented and cooperative to examination.  Pt. walks without an antalgic gait.      Examination of the right thigh shows well-healing surgical incisions.  No erythema drainage.  Minimal swelling.    X-rays:  X-rays of the right femur ordered and reviewed which show a well-aligned midshaft femur fracture.  Early callus formation noted.  The most distal locking screw is  starting to back out some.     Assessment::  Status post right IM nailing    Plan:  Reviewed the findings with her today.  Patient may continue with physical therapy.  Weightbearing as tolerated.  Follow-up in 6 weeks with another x-ray of the right femur.  Talked about possible screw removal if it continues to back out and become symptomatic enough.    This note was created using M Modal voice recognition software that occasionally misinterpreted phrases or words.

## 2022-07-07 NOTE — PROGRESS NOTES
[facility]   Physical Therapy Treatment Note     Name: Aimee Palumbo  Clinic Number: [id number]    Therapy Diagnosis:   Encounter Diagnoses   Name Primary?    Acute pain of right thigh Yes    Decreased range of motion of right knee      Physician: Atilio Leary MD    Visit Date: 2022    Physician Orders: PT Eval and Treat   Medical Diagnosis from Referral: S72.321A (ICD-10-CM) - Displaced transverse fracture of shaft of right femur, initial encounter for closed fracture  Evaluation Date: 2022  Authorization Period Expiration: 22  Plan of Care Expiration: 10/20/22  Progress Note Due: 22  Visit # / Visits authorized:    FOTO: 55% limitation  FOTO 1st follow up:  FOTO 2nd follow up:     Precautions: Standard and Fall osteoporosis, 2 stents in , blood thinner,        PTA Visit #: 0/5     FOTO first follow up:   FOTO second follow up:     Time In: 7:55  Time Out: 9:03  Total Billable Time: 42 minutes    SUBJECTIVE     Pt reports: doing well today, no pain at rest    She was compliant with home exercise program.  Response to previous treatment: Positive  Functional change: First treat after eval     Pain: 3/10  Location: right Leg     OBJECTIVE     Objective Measures updated at progress report unless specified.   TU seconds with rolling walker     2 minute walk test: 176 feet with rolling walker     Range of Motion (Passive):   Knee Right  Left    Flexion 100 125   Extension 0 0      Range of Motion (Active):   Knee Right  Left    Flexion 95 125   Extension 0 0         Lower Extremity Strength  Right LE   Left LE     Quadriceps: 3+/5 Quadriceps: 4/5   Hamstrings: 4/5 Hamstrings: 4/5   Hip flexion (seated): NT Hip flexion (seated): 4/5   Hip extension:  NT Hip extension: NT   PGM: NT PGM:  NT   Hip ER:  NT Hip ER:  NT   Hip IR: NT Hip IR: NT       Treatment     Aimee received the treatments listed below:      therapeutic exercises to develop  "strength and ROM for 44 minutes including:  Quad sets with towel 30x5"  Straight leg raise, 3 x 8  S/l clams with red TB 2x15  Heel slides 30x  SAQ 3# 3x10  LAQ 2x15  Nustep level 3 6' for range of motion, strength, and cardiovascular endurance  Double leg press 40# 2x15  Pt education     Not today:  Standing hip abduction 2 x 10   Standing hip extension 2 x 10   Standing Terminal Knee Extension, 3 x 10 Red theraband   Step ups on 6 inch step, 2 x 10     neuromuscular re-education activities to improve: muscle control and activation  For 15 minutes. The following activities were included:  Knee assessment  Objective measurements    NMES:   -quad sets, x 8 min 10 on / 10 off    -Straight leg raise, x3 min 5 on / 5 off       Patient Education and Home Exercises     Home Exercises Provided and Patient Education Provided     Education provided:   - Continue HEP     Written Home Exercises Provided: Patient instructed to cont prior HEP. Exercises were reviewed and Aimee was able to demonstrate them prior to the end of the session.  Aimee demonstrated good  understanding of the education provided. See EMR under Patient Instructions for exercises provided during therapy sessions    ASSESSMENT     Aimee has progressed well and is improving overall. Progressed with range of motion, improved endurance with ambulance with TUG and 2 minute walk test and quad strength. Added leg press and nustep today. Continue to progress as tolerated.     Aimee Is progressing well towards her goals.   Pt prognosis is Good.     Pt will continue to benefit from skilled outpatient physical therapy to address the deficits listed in the problem list box on initial evaluation, provide pt/family education and to maximize pt's level of independence in the home and community environment.     Pt's spiritual, cultural and educational needs considered and pt agreeable to plan of care and goals.     Anticipated barriers to physical therapy: " surgery    GOALS: Short Term Goals:  4-6 weeks  1.Report decreased leg pain  < / =  2/10  to increase tolerance for ambulation  2. Increase knee ROM to full passive in order to be able to perform ADLs without difficulty.  3. Increase strength by 1/3 MMT grade in quad  to increase tolerance for ADL and work activities.  4. Pt to tolerate HEP to improve ROM and independence with ADL's     Long Term Goals: 12-16 weeks  1.Report decreased leg pain < / = 1/10  to increase tolerance for ambulation  2.Patient goal: return to independence, going up stairs, and gardening activities  3.Increase strength to >/= 4+/5 in quad  to increase tolerance for ADL and work activities.  4. Pt will report at CJ level (20-40% impaired) on FOTO knee to demonstrate increase in LE function with every day tasks.   5. Pt will improve TUG score without AD to <14 in order to show decreased falls risk.          PLAN     Continue to treat and progress per POC and pt tolerance       Russ Sultana, PT

## 2022-07-11 ENCOUNTER — CLINICAL SUPPORT (OUTPATIENT)
Dept: REHABILITATION | Facility: HOSPITAL | Age: 72
End: 2022-07-11
Payer: MEDICARE

## 2022-07-11 DIAGNOSIS — M79.651 ACUTE PAIN OF RIGHT THIGH: Primary | ICD-10-CM

## 2022-07-11 DIAGNOSIS — M25.661 DECREASED RANGE OF MOTION OF RIGHT KNEE: ICD-10-CM

## 2022-07-11 PROCEDURE — 97112 NEUROMUSCULAR REEDUCATION: CPT | Mod: PN,CQ

## 2022-07-11 PROCEDURE — 97110 THERAPEUTIC EXERCISES: CPT | Mod: PN,CQ

## 2022-07-11 NOTE — PROGRESS NOTES
"    [facility]   Physical Therapy Treatment Note     Name: Aimee Palumbo  Clinic Number: [id number]    Therapy Diagnosis:   Encounter Diagnoses   Name Primary?    Acute pain of right thigh Yes    Decreased range of motion of right knee      Physician: Atilio Leary MD    Visit Date: 7/11/2022    Physician Orders: PT Eval and Treat   Medical Diagnosis from Referral: S72.321A (ICD-10-CM) - Displaced transverse fracture of shaft of right femur, initial encounter for closed fracture  Evaluation Date: 6/22/2022  Authorization Period Expiration: 12/31/22  Plan of Care Expiration: 10/20/22  Progress Note Due: 7/20/22  Visit # / Visits authorized: 4/ 20   FOTO: 55% limitation  FOTO 1st follow up:  FOTO 2nd follow up:     Precautions: Standard and Fall osteoporosis, 2 stents in 2009, blood thinner,        PTA Visit #: 0/5     FOTO first follow up:   FOTO second follow up:     Time In:1330   Time Out: 1430   Total Billable Time: 60 minutes    SUBJECTIVE     Pt reports: doing well today, no pain at rest    She was compliant with home exercise program.  Response to previous treatment: Positive  Functional change: Able to ambulate with less dependency of RW      Pain: 0/10  Location: right Leg     OBJECTIVE       Treatment     Aimee received the treatments listed below:      therapeutic exercises to develop strength and ROM for 45 minutes including:  Quad sets with towel 30x5"  Straight leg raise, 3 x 8  S/l clams with red TB 2x15  Heel slides 30x  SAQ 4# 3x10  LAQ 2x15 2#  Nustep level 3 6' for range of motion, strength, and cardiovascular endurance  Double leg press 40# 2x15  Ambulated 224 feet with single point cane and contact guard assist     Not today:  Standing hip abduction 2 x 10   Standing hip extension 2 x 10   Standing Terminal Knee Extension, 3 x 10 Red theraband   Step ups on 6 inch step, 2 x 10     neuromuscular re-education activities to improve: muscle control and " activation  For 15 minutes. The following activities were included:    NMES:   -quad sets, x 8 min 10 on / 10 off    -Straight leg raise, x3 min 5 on / 5 off       Patient Education and Home Exercises     Home Exercises Provided and Patient Education Provided     Education provided:   - Continue HEP     Written Home Exercises Provided: Patient instructed to cont prior HEP. Exercises were reviewed and Aimee was able to demonstrate them prior to the end of the session.  Aimee demonstrated good  understanding of the education provided. See EMR under Patient Instructions for exercises provided during therapy sessions    ASSESSMENT     Patient progressing well with all therex and even demo improved tolerance to WB as she was able to ambulate with single point cane with contact guard assist from PTA. Pt with good tolerance to therapy session with no adverse effects reported.        Aimee Is progressing well towards her goals.   Pt prognosis is Good.     Pt will continue to benefit from skilled outpatient physical therapy to address the deficits listed in the problem list box on initial evaluation, provide pt/family education and to maximize pt's level of independence in the home and community environment.     Pt's spiritual, cultural and educational needs considered and pt agreeable to plan of care and goals.     Anticipated barriers to physical therapy: surgery    GOALS: Short Term Goals:  4-6 weeks  1.Report decreased leg pain  < / =  2/10  to increase tolerance for ambulation  2. Increase knee ROM to full passive in order to be able to perform ADLs without difficulty.  3. Increase strength by 1/3 MMT grade in quad  to increase tolerance for ADL and work activities.  4. Pt to tolerate HEP to improve ROM and independence with ADL's     Long Term Goals: 12-16 weeks  1.Report decreased leg pain < / = 1/10  to increase tolerance for ambulation  2.Patient goal: return to independence, going up stairs, and gardening  activities  3.Increase strength to >/= 4+/5 in quad  to increase tolerance for ADL and work activities.  4. Pt will report at CJ level (20-40% impaired) on FOTO knee to demonstrate increase in LE function with every day tasks.   5. Pt will improve TUG score without AD to <14 in order to show decreased falls risk.          PLAN     Continue to treat and progress per POC and pt tolerance       Johnnie Gore, PTA

## 2022-07-14 ENCOUNTER — CLINICAL SUPPORT (OUTPATIENT)
Dept: REHABILITATION | Facility: HOSPITAL | Age: 72
End: 2022-07-14
Payer: MEDICARE

## 2022-07-14 DIAGNOSIS — M79.651 ACUTE PAIN OF RIGHT THIGH: Primary | ICD-10-CM

## 2022-07-14 DIAGNOSIS — M25.661 DECREASED RANGE OF MOTION OF RIGHT KNEE: ICD-10-CM

## 2022-07-14 PROCEDURE — 97116 GAIT TRAINING THERAPY: CPT | Mod: PN,CQ

## 2022-07-14 PROCEDURE — 97110 THERAPEUTIC EXERCISES: CPT | Mod: PN,CQ

## 2022-07-14 NOTE — PROGRESS NOTES
"    [facility]   Physical Therapy Treatment Note     Name: Aimee Palumbo  Clinic Number: [id number]    Therapy Diagnosis:   Encounter Diagnoses   Name Primary?    Acute pain of right thigh Yes    Decreased range of motion of right knee      Physician: Atilio Leary MD    Visit Date: 7/14/2022    Physician Orders: PT Eval and Treat   Medical Diagnosis from Referral: S72.321A (ICD-10-CM) - Displaced transverse fracture of shaft of right femur, initial encounter for closed fracture  Evaluation Date: 6/22/2022  Authorization Period Expiration: 12/31/22  Plan of Care Expiration: 10/20/22  Progress Note Due: 7/20/22  Visit # / Visits authorized: 6 / 20   FOTO: 55% limitation  FOTO 1st follow up:  FOTO 2nd follow up:     Precautions: Standard and Fall osteoporosis, 2 stents in 2009, blood thinner,        PTA Visit #: 1/5     FOTO first follow up:   FOTO second follow up:     Time In:1300  Time Out: 1400   Total Billable Time: 60 minutes    SUBJECTIVE     Pt reports: She was feeling sore in her quad today     She was compliant with home exercise program.  Response to previous treatment: Positive  Functional change: Able to ambulate with less dependency of RW      Pain: 0/10  Location: right Leg     OBJECTIVE       Treatment     Aimee received the treatments listed below:      therapeutic exercises to develop strength and ROM for 50 minutes including:  Quad sets with towel 30x5"  Straight leg raise, 3 x 8  S/l clams with red TB 2x15  Heel slides 30x  SAQ 5# 3x10  LAQ 2x15 2#  Nustep level 3 6' for range of motion, strength, and cardiovascular endurance  Double leg press 40# 2x15  Ambulated 224 feet with single point cane and contact guard assist     Not today:  Standing hip abduction 2 x 10   Standing hip extension 2 x 10   Standing Terminal Knee Extension, 3 x 10 Red theraband   Step ups on 6 inch step, 2 x 10     Gait training with single point cane x 10 min     Ambulation with " single point cane for 250ft   Step over gate focusing on heel strike B   Lateral step overs x 5 laps 5 seth     Patient Education and Home Exercises     Home Exercises Provided and Patient Education Provided     Education provided:   - Continue HEP     Written Home Exercises Provided: Patient instructed to cont prior HEP. Exercises were reviewed and Aimee was able to demonstrate them prior to the end of the session.  Aimee demonstrated good  understanding of the education provided. See EMR under Patient Instructions for exercises provided during therapy sessions    ASSESSMENT     Patient able to perform increased ambulation this visit with single point cane requiring verbal cues for step length and heel strike. Patient continues to hesitate with WB and was performed to her tolerance.        Aimee Is progressing well towards her goals.   Pt prognosis is Good.     Pt will continue to benefit from skilled outpatient physical therapy to address the deficits listed in the problem list box on initial evaluation, provide pt/family education and to maximize pt's level of independence in the home and community environment.     Pt's spiritual, cultural and educational needs considered and pt agreeable to plan of care and goals.     Anticipated barriers to physical therapy: surgery    GOALS: Short Term Goals:  4-6 weeks  1.Report decreased leg pain  < / =  2/10  to increase tolerance for ambulation  2. Increase knee ROM to full passive in order to be able to perform ADLs without difficulty.  3. Increase strength by 1/3 MMT grade in quad  to increase tolerance for ADL and work activities.  4. Pt to tolerate HEP to improve ROM and independence with ADL's     Long Term Goals: 12-16 weeks  1.Report decreased leg pain < / = 1/10  to increase tolerance for ambulation  2.Patient goal: return to independence, going up stairs, and gardening activities  3.Increase strength to >/= 4+/5 in quad  to increase tolerance for ADL and  work activities.  4. Pt will report at CJ level (20-40% impaired) on FOTO knee to demonstrate increase in LE function with every day tasks.   5. Pt will improve TUG score without AD to <14 in order to show decreased falls risk.          PLAN     Continue to treat and progress per POC and pt tolerance       Johnnie Gore, PTA

## 2022-07-19 ENCOUNTER — CLINICAL SUPPORT (OUTPATIENT)
Dept: REHABILITATION | Facility: HOSPITAL | Age: 72
End: 2022-07-19
Payer: MEDICARE

## 2022-07-19 DIAGNOSIS — M79.651 ACUTE PAIN OF RIGHT THIGH: Primary | ICD-10-CM

## 2022-07-19 DIAGNOSIS — M25.661 DECREASED RANGE OF MOTION OF RIGHT KNEE: ICD-10-CM

## 2022-07-19 PROCEDURE — 97110 THERAPEUTIC EXERCISES: CPT | Mod: PN,CQ

## 2022-07-19 PROCEDURE — 97116 GAIT TRAINING THERAPY: CPT | Mod: PN,CQ

## 2022-07-19 NOTE — PROGRESS NOTES
"    [facility]   Physical Therapy Treatment Note     Name: Aimee Palumbo  Clinic Number: [id number]    Therapy Diagnosis:   Encounter Diagnoses   Name Primary?    Acute pain of right thigh Yes    Decreased range of motion of right knee      Physician: Atilio Leary MD    Visit Date: 7/19/2022    Physician Orders: PT Eval and Treat   Medical Diagnosis from Referral: S72.321A (ICD-10-CM) - Displaced transverse fracture of shaft of right femur, initial encounter for closed fracture  Evaluation Date: 6/22/2022  Authorization Period Expiration: 12/31/22  Plan of Care Expiration: 10/20/22  Progress Note Due: 7/20/22  Visit # / Visits authorized: 6 / 20   FOTO: 55% limitation  FOTO 1st follow up:  FOTO 2nd follow up:     Precautions: Standard and Fall osteoporosis, 2 stents in 2009, blood thinner,        PTA Visit #: 1/5     FOTO first follow up:   FOTO second follow up:     Time In:1300  Time Out: 1400   Total Billable Time: 60 minutes    SUBJECTIVE     Pt reports: She was feeling sore in her quad today     She was compliant with home exercise program.  Response to previous treatment: Positive  Functional change: Able to ambulate with less dependency of RW      Pain: 0/10  Location: right Leg     OBJECTIVE       Treatment     Aimee received the treatments listed below:      therapeutic exercises to develop strength and ROM for 50 minutes including:  Treadmill, x 5 min 1.5 mph   Quad sets with towel 30x5"  Straight leg raise, 3 x 8  S/l clams with Green TB 2x15  Heel slides 30x  SAQ 5# 3x10  LAQ 2x15 2#  Double leg press 40# 2x15  Ambulated 224 feet with single point cane and contact guard assist     Not today:  Standing hip abduction 2 x 10   Standing hip extension 2 x 10   Standing Terminal Knee Extension, 3 x 10 Red theraband   Step ups on 6 inch step, 2 x 10     Gait training with single point cane x 10 min     Ambulation with single point cane for 250ft   Step over gate " focusing on heel strike B   Lateral step overs x 5 laps 5 seth     Patient Education and Home Exercises     Home Exercises Provided and Patient Education Provided     Education provided:   - Continue HEP     Written Home Exercises Provided: Patient instructed to cont prior HEP. Exercises were reviewed and Aimee was able to demonstrate them prior to the end of the session.  Aimee demonstrated good  understanding of the education provided. See EMR under Patient Instructions for exercises provided during therapy sessions    ASSESSMENT     Patient angel improved gait mechanics and anna with single point cane this visit. Continues to be hesitant with some therex in order to progress requiring encouragement to perform. Pt with good tolerance to therapy session with no adverse effects reported.          Aimee Is progressing well towards her goals.   Pt prognosis is Good.     Pt will continue to benefit from skilled outpatient physical therapy to address the deficits listed in the problem list box on initial evaluation, provide pt/family education and to maximize pt's level of independence in the home and community environment.     Pt's spiritual, cultural and educational needs considered and pt agreeable to plan of care and goals.     Anticipated barriers to physical therapy: surgery    GOALS: Short Term Goals:  4-6 weeks  1.Report decreased leg pain  < / =  2/10  to increase tolerance for ambulation  2. Increase knee ROM to full passive in order to be able to perform ADLs without difficulty.  3. Increase strength by 1/3 MMT grade in quad  to increase tolerance for ADL and work activities.  4. Pt to tolerate HEP to improve ROM and independence with ADL's     Long Term Goals: 12-16 weeks  1.Report decreased leg pain < / = 1/10  to increase tolerance for ambulation  2.Patient goal: return to independence, going up stairs, and gardening activities  3.Increase strength to >/= 4+/5 in quad  to increase tolerance for  ADL and work activities.  4. Pt will report at CJ level (20-40% impaired) on FOTO knee to demonstrate increase in LE function with every day tasks.   5. Pt will improve TUG score without AD to <14 in order to show decreased falls risk.          PLAN     Continue to treat and progress per POC and pt tolerance       Johnnie Gore, PTA

## 2022-07-21 ENCOUNTER — CLINICAL SUPPORT (OUTPATIENT)
Dept: REHABILITATION | Facility: HOSPITAL | Age: 72
End: 2022-07-21
Payer: MEDICARE

## 2022-07-21 DIAGNOSIS — M25.661 DECREASED RANGE OF MOTION OF RIGHT KNEE: ICD-10-CM

## 2022-07-21 DIAGNOSIS — M79.651 ACUTE PAIN OF RIGHT THIGH: Primary | ICD-10-CM

## 2022-07-21 PROCEDURE — 97530 THERAPEUTIC ACTIVITIES: CPT | Mod: PN

## 2022-07-21 PROCEDURE — 97110 THERAPEUTIC EXERCISES: CPT | Mod: PN

## 2022-07-21 NOTE — PROGRESS NOTES
"    [facility]   Physical Therapy Treatment Note     Name: Aimee Palumbo  Clinic Number: [id number]    Therapy Diagnosis:   No diagnosis found.  Physician: Atilio Leary MD    Visit Date: 7/21/2022    Physician Orders: PT Eval and Treat   Medical Diagnosis from Referral: S72.321A (ICD-10-CM) - Displaced transverse fracture of shaft of right femur, initial encounter for closed fracture  Evaluation Date: 6/22/2022  Authorization Period Expiration: 12/31/22  Plan of Care Expiration: 10/20/22  Progress Note Due: 7/20/22  Visit # / Visits authorized: 8/ 20   FOTO: 55% limitation  FOTO 1st follow up: 53% limitation  FOTO 2nd follow up: 38% limitation     Precautions: Standard and Fall osteoporosis, 2 stents in 2009, blood thinner,        PTA Visit #: 0/5     FOTO first follow up:   FOTO second follow up:     Time In:14:00  Time Out: 15:03  Total Billable Time: 25 minutes    SUBJECTIVE     Pt reports: doing much better and feels better with stairs at home.     She was compliant with home exercise program.  Response to previous treatment: Positive  Functional change: Able to ambulate with less dependency of RW      Pain: 0/10  Location: right Leg     OBJECTIVE       Treatment     Range of Motion (Passive):   Knee Right  Left    Flexion 105 125   Extension 0 0       Observation: performed SLR without knee ext lag    Aimee received the treatments listed below:      therapeutic exercises to develop strength and ROM for 34 minutes including:  Treadmill, x 5 min 1.5 mph   Quad sets with towel 30x5"  Straight leg raise, 3 x 10  Heel slides 30x  LAQ 2x15 2#  Double leg press 50# 3x15  Single leg press 20# 3x10 each  Ambulated 224 feet with single point cane and contact guard assist     Not today:  Standing hip abduction 2 x 10   Standing hip extension 2 x 10   Standing Terminal Knee Extension, 3 x 10 Red theraband   Step ups on 6 inch step, 2 x 10     Aimee participated in gait training to " improve functional mobility and safety for 10 minutes, including    Gait training with single point cane x 10 min     Ambulation without single point cane for 250ft   Step over gate focusing on heel strike B   Lateral step overs x 5 laps 5 seth     Aimee participated in dynamic functional therapeutic activities to improve functional performance for 12  minutes, including:    NuStep Level 3 for 6' for range of motion, strength, and cardiovascular endurance  Squats to 18 inch box with 10# weight 2x20  Step ups with to 4in box 30x each        Patient Education and Home Exercises     Home Exercises Provided and Patient Education Provided     Education provided:   - Continue HEP     Written Home Exercises Provided: Patient instructed to cont prior HEP. Exercises were reviewed and Aimee was able to demonstrate them prior to the end of the session.  Aimee demonstrated good  understanding of the education provided. See EMR under Patient Instructions for exercises provided during therapy sessions    ASSESSMENT     Aimee did well with todays session and was able to ambulate short distance without cane but had increased trendelenburg. Progressed with functional exercises and added squats to box with weight. Will continue to progress exercises as tolerated.     Aimee Is progressing well towards her goals.   Pt prognosis is Good.     Pt will continue to benefit from skilled outpatient physical therapy to address the deficits listed in the problem list box on initial evaluation, provide pt/family education and to maximize pt's level of independence in the home and community environment.     Pt's spiritual, cultural and educational needs considered and pt agreeable to plan of care and goals.     Anticipated barriers to physical therapy: surgery    GOALS: Short Term Goals:  4-6 weeks  1.Report decreased leg pain  < / =  2/10  to increase tolerance for ambulation  2. Increase knee ROM to full passive in order to be able  to perform ADLs without difficulty.  3. Increase strength by 1/3 MMT grade in quad  to increase tolerance for ADL and work activities.  4. Pt to tolerate HEP to improve ROM and independence with ADL's     Long Term Goals: 12-16 weeks  1.Report decreased leg pain < / = 1/10  to increase tolerance for ambulation  2.Patient goal: return to independence, going up stairs, and gardening activities  3.Increase strength to >/= 4+/5 in quad  to increase tolerance for ADL and work activities.  4. Pt will report at CJ level (20-40% impaired) on FOTO knee to demonstrate increase in LE function with every day tasks.   5. Pt will improve TUG score without AD to <14 in order to show decreased falls risk.          PLAN     Continue to treat and progress per POC and pt tolerance       Russ Sultana, PT

## 2022-07-25 ENCOUNTER — TELEPHONE (OUTPATIENT)
Dept: ORTHOPEDICS | Facility: CLINIC | Age: 72
End: 2022-07-25
Payer: MEDICARE

## 2022-07-25 NOTE — TELEPHONE ENCOUNTER
----- Message from Ashly Dubose MA sent at 7/25/2022  8:57 AM CDT -----  Contact: pt  Pt states she had surgery, having pain, wants to be seen or called back   Call back

## 2022-07-25 NOTE — TELEPHONE ENCOUNTER
Patient is having pain but in her knee states she wants an apt to have her look at it. States she stopped using cane advised her to continue to use. Made pt apt verbalized understanding of date time and location

## 2022-07-27 DIAGNOSIS — S72.331D CLOSED DISPLACED OBLIQUE FRACTURE OF SHAFT OF RIGHT FEMUR WITH ROUTINE HEALING, SUBSEQUENT ENCOUNTER: Primary | ICD-10-CM

## 2022-07-28 ENCOUNTER — CLINICAL SUPPORT (OUTPATIENT)
Dept: REHABILITATION | Facility: HOSPITAL | Age: 72
End: 2022-07-28
Payer: MEDICARE

## 2022-07-28 DIAGNOSIS — M25.661 DECREASED RANGE OF MOTION OF RIGHT KNEE: ICD-10-CM

## 2022-07-28 DIAGNOSIS — M79.651 ACUTE PAIN OF RIGHT THIGH: Primary | ICD-10-CM

## 2022-07-28 PROCEDURE — 97110 THERAPEUTIC EXERCISES: CPT | Mod: PN

## 2022-07-28 NOTE — PROGRESS NOTES
"    [facility]   Physical Therapy Treatment Note     Name: Aimee Palumbo  Clinic Number: [id number]    Therapy Diagnosis:   Encounter Diagnoses   Name Primary?    Acute pain of right thigh Yes    Decreased range of motion of right knee      Physician: Atilio Leary MD    Visit Date: 7/28/2022    Physician Orders: PT Eval and Treat   Medical Diagnosis from Referral: S72.321A (ICD-10-CM) - Displaced transverse fracture of shaft of right femur, initial encounter for closed fracture  Evaluation Date: 6/22/2022  Authorization Period Expiration: 12/31/22  Plan of Care Expiration: 10/20/22  Progress Note Due: 7/20/22  Visit # / Visits authorized: 9/ 20   FOTO: 55% limitation  FOTO 1st follow up: 53% limitation  FOTO 2nd follow up: 38% limitation     Precautions: Standard and Fall osteoporosis, 2 stents in 2009, blood thinner,        PTA Visit #: 0/5     FOTO first follow up:   FOTO second follow up:     Time In: 0800  Time Out: 0900  Total Billable Time: 30 minutes    SUBJECTIVE     Pt reports: her leg is hurting more today she feels like she is going backwards      She was compliant with home exercise program.  Response to previous treatment: Positive  Functional change: Able to ambulate with less dependency of RW      Pain: 0/10  Location: right Leg     OBJECTIVE       Treatment   30 minutes spent 1 on 1    Range of Motion (Passive):   Knee Right  Left    Flexion 105 125   Extension 0 0       Observation: performed SLR without knee ext lag    Aimee received the treatments listed below:      therapeutic exercises to develop strength and ROM for 34 minutes including:      Quad sets with towel 30x5"  Straight leg raise, 3 x 8  Heel slides 30x  LAQ 2x15 3#  Shuttle Double leg press 62# 3x12  Single leg press 37# 3x10 each  Ambulated 224 feet with single point cane and contact guard assist     Not today:  Treadmill, x 5 min 1.5 mph   Standing hip abduction 2 x 10   Standing hip " "extension 2 x 10   Standing Terminal Knee Extension, 3 x 10 Red theraband   Step ups on 6 inch step, 2 x 10     Aimee participated in gait training to improve functional mobility and safety for 10 minutes, including    Gait training with and without single point cane x 10 min     Stepping over 6" hurdles with Left Lower Extremity x5 laps there and back   Ambulation without single point cane for 250ft         Not today:   Step over gate focusing on heel strike B   Lateral step overs x 5 laps 5 seth     Aimee participated in dynamic functional therapeutic activities to improve functional performance for 12  minutes, including:    Squats to 18 inch box with 10# weight 3x10  Step ups with to 4in box 30x each    Not today:   NuStep Level 3 for 6' for range of motion, strength, and cardiovascular endurance    Patient Education and Home Exercises     Home Exercises Provided and Patient Education Provided     Education provided:   - Continue HEP     Written Home Exercises Provided: Patient instructed to cont prior HEP. Exercises were reviewed and Aimee was able to demonstrate them prior to the end of the session.  Aimee demonstrated good  understanding of the education provided. See EMR under Patient Instructions for exercises provided during therapy sessions    ASSESSMENT     Aimee presents with reports of increased pain in Right Lower extremity and concerned about regressing. She requires education on prognosis and plan of care. Pt verbalizes understanding and feels better about where she is at after education. We were able to increase resistance for leg press and perform step ups on 6" steps today. Will progress as able.     Aimee Is progressing well towards her goals.   Pt prognosis is Good.     Pt will continue to benefit from skilled outpatient physical therapy to address the deficits listed in the problem list box on initial evaluation, provide pt/family education and to maximize pt's level of " independence in the home and community environment.     Pt's spiritual, cultural and educational needs considered and pt agreeable to plan of care and goals.     Anticipated barriers to physical therapy: surgery    GOALS: Short Term Goals:  4-6 weeks  1.Report decreased leg pain  < / =  2/10  to increase tolerance for ambulation  2. Increase knee ROM to full passive in order to be able to perform ADLs without difficulty.  3. Increase strength by 1/3 MMT grade in quad  to increase tolerance for ADL and work activities.  4. Pt to tolerate HEP to improve ROM and independence with ADL's     Long Term Goals: 12-16 weeks  1.Report decreased leg pain < / = 1/10  to increase tolerance for ambulation  2.Patient goal: return to independence, going up stairs, and gardening activities  3.Increase strength to >/= 4+/5 in quad  to increase tolerance for ADL and work activities.  4. Pt will report at CJ level (20-40% impaired) on FOTO knee to demonstrate increase in LE function with every day tasks.   5. Pt will improve TUG score without AD to <14 in order to show decreased falls risk.          PLAN     Continue to treat and progress per POC and pt tolerance       Moisés Tan PT

## 2022-08-01 ENCOUNTER — HOSPITAL ENCOUNTER (OUTPATIENT)
Dept: RADIOLOGY | Facility: HOSPITAL | Age: 72
Discharge: HOME | End: 2022-08-01
Attending: ORTHOPAEDIC SURGERY
Payer: MEDICARE

## 2022-08-01 ENCOUNTER — CLINICAL SUPPORT (OUTPATIENT)
Dept: REHABILITATION | Facility: HOSPITAL | Age: 72
End: 2022-08-01
Payer: MEDICARE

## 2022-08-01 ENCOUNTER — OFFICE VISIT (OUTPATIENT)
Dept: ORTHOPEDICS | Facility: CLINIC | Age: 72
End: 2022-08-01
Payer: MEDICARE

## 2022-08-01 VITALS — HEIGHT: 59 IN | WEIGHT: 180 LBS | RESPIRATION RATE: 18 BRPM | BODY MASS INDEX: 36.29 KG/M2

## 2022-08-01 DIAGNOSIS — M17.10 ARTHRITIS OF KNEE: ICD-10-CM

## 2022-08-01 DIAGNOSIS — S72.331D CLOSED DISPLACED OBLIQUE FRACTURE OF SHAFT OF RIGHT FEMUR WITH ROUTINE HEALING, SUBSEQUENT ENCOUNTER: Primary | ICD-10-CM

## 2022-08-01 DIAGNOSIS — M25.661 DECREASED RANGE OF MOTION OF RIGHT KNEE: ICD-10-CM

## 2022-08-01 DIAGNOSIS — S72.331D CLOSED DISPLACED OBLIQUE FRACTURE OF SHAFT OF RIGHT FEMUR WITH ROUTINE HEALING, SUBSEQUENT ENCOUNTER: ICD-10-CM

## 2022-08-01 DIAGNOSIS — M17.11 PRIMARY OSTEOARTHRITIS OF RIGHT KNEE: ICD-10-CM

## 2022-08-01 DIAGNOSIS — M17.11 PRIMARY OSTEOARTHRITIS OF RIGHT KNEE: Primary | ICD-10-CM

## 2022-08-01 DIAGNOSIS — M79.651 ACUTE PAIN OF RIGHT THIGH: Primary | ICD-10-CM

## 2022-08-01 PROCEDURE — 73564 XR KNEE ORTHO RIGHT WITH FLEXION: ICD-10-PCS | Mod: 26,RT,, | Performed by: RADIOLOGY

## 2022-08-01 PROCEDURE — 99024 PR POST-OP FOLLOW-UP VISIT: ICD-10-PCS | Mod: S$PBB,,, | Performed by: ORTHOPAEDIC SURGERY

## 2022-08-01 PROCEDURE — 99213 OFFICE O/P EST LOW 20 MIN: CPT | Mod: PBBFAC,PN | Performed by: ORTHOPAEDIC SURGERY

## 2022-08-01 PROCEDURE — 73552 XR FEMUR 2 VIEW RIGHT: ICD-10-PCS | Mod: 26,RT,, | Performed by: RADIOLOGY

## 2022-08-01 PROCEDURE — 73552 X-RAY EXAM OF FEMUR 2/>: CPT | Mod: 26,RT,, | Performed by: RADIOLOGY

## 2022-08-01 PROCEDURE — 99213 PR OFFICE/OUTPT VISIT, EST, LEVL III, 20-29 MIN: ICD-10-PCS | Mod: 24,25,S$PBB, | Performed by: ORTHOPAEDIC SURGERY

## 2022-08-01 PROCEDURE — 99213 OFFICE O/P EST LOW 20 MIN: CPT | Mod: 24,25,S$PBB, | Performed by: ORTHOPAEDIC SURGERY

## 2022-08-01 PROCEDURE — 20610 LARGE JOINT ASPIRATION/INJECTION: R KNEE: ICD-10-PCS | Mod: S$PBB,79,RT, | Performed by: ORTHOPAEDIC SURGERY

## 2022-08-01 PROCEDURE — 73562 X-RAY EXAM OF KNEE 3: CPT | Mod: 59,TC,PN,LT

## 2022-08-01 PROCEDURE — 99999 PR PBB SHADOW E&M-EST. PATIENT-LVL III: ICD-10-PCS | Mod: PBBFAC,,, | Performed by: ORTHOPAEDIC SURGERY

## 2022-08-01 PROCEDURE — 73562 X-RAY EXAM OF KNEE 3: CPT | Mod: 26,LT,, | Performed by: RADIOLOGY

## 2022-08-01 PROCEDURE — 99024 POSTOP FOLLOW-UP VISIT: CPT | Mod: S$PBB,,, | Performed by: ORTHOPAEDIC SURGERY

## 2022-08-01 PROCEDURE — 73564 X-RAY EXAM KNEE 4 OR MORE: CPT | Mod: 26,RT,, | Performed by: RADIOLOGY

## 2022-08-01 PROCEDURE — 73552 X-RAY EXAM OF FEMUR 2/>: CPT | Mod: TC,PN,RT

## 2022-08-01 PROCEDURE — 20610 DRAIN/INJ JOINT/BURSA W/O US: CPT | Mod: PBBFAC,PN,79,RT | Performed by: ORTHOPAEDIC SURGERY

## 2022-08-01 PROCEDURE — 97110 THERAPEUTIC EXERCISES: CPT | Mod: PN,CQ

## 2022-08-01 PROCEDURE — 99999 PR PBB SHADOW E&M-EST. PATIENT-LVL III: CPT | Mod: PBBFAC,,, | Performed by: ORTHOPAEDIC SURGERY

## 2022-08-01 PROCEDURE — 73562 XR KNEE ORTHO RIGHT WITH FLEXION: ICD-10-PCS | Mod: 26,LT,, | Performed by: RADIOLOGY

## 2022-08-01 PROCEDURE — 97530 THERAPEUTIC ACTIVITIES: CPT | Mod: PN,CQ

## 2022-08-01 RX ORDER — TRIAMCINOLONE ACETONIDE 40 MG/ML
40 INJECTION, SUSPENSION INTRA-ARTICULAR; INTRAMUSCULAR
Status: DISCONTINUED | OUTPATIENT
Start: 2022-08-01 | End: 2022-08-01 | Stop reason: HOSPADM

## 2022-08-01 RX ADMIN — TRIAMCINOLONE ACETONIDE 40 MG: 40 INJECTION, SUSPENSION INTRA-ARTICULAR; INTRAMUSCULAR at 10:08

## 2022-08-01 NOTE — PROCEDURES
Large Joint Aspiration/Injection: R knee    Date/Time: 8/1/2022 10:00 AM  Performed by: Choco Cristobal MD  Authorized by: Choco Cristobal MD     Consent Done?:  Yes (Verbal)  Indications:  Pain  Site marked: the procedure site was marked    Timeout: prior to procedure the correct patient, procedure, and site was verified    Local anesthetic:  Lidocaine 1% without epinephrine and bupivacaine 0.25% without epinephrine  Anesthetic total (ml):  6      Details:  Needle Size:  20 G  Approach:  Anterolateral  Location:  Knee  Site:  R knee  Medications:  40 mg triamcinolone acetonide 40 mg/mL  Patient tolerance:  Patient tolerated the procedure well with no immediate complications

## 2022-08-01 NOTE — PROGRESS NOTES
" [facility]   Physical Therapy Treatment Note     Name: Aimee Palumbo  Clinic Number: [id number]    Therapy Diagnosis:   Encounter Diagnoses   Name Primary?    Acute pain of right thigh Yes    Decreased range of motion of right knee      Physician: Atilio Leary MD    Visit Date: 8/1/2022    Physician Orders: PT Eval and Treat   Medical Diagnosis from Referral: S72.321A (ICD-10-CM) - Displaced transverse fracture of shaft of right femur, initial encounter for closed fracture  Evaluation Date: 6/22/2022  Authorization Period Expiration: 12/31/22  Plan of Care Expiration: 10/20/22  Progress Note Due: 7/20/22  Visit # / Visits authorized: 10 / 20   FOTO: 55% limitation  FOTO 1st follow up: 53% limitation  FOTO 2nd follow up: 38% limitation     Precautions: Standard and Fall osteoporosis, 2 stents in 2009, blood thinner,        PTA Visit #: 1/5     FOTO first follow up:   FOTO second follow up:     Time In: 0735  Time Out: 0830  Total Billable Time: 55 minutes    SUBJECTIVE     Pt reports: states she is feeling better since last visit and continues to require her single point cane when out in public.     She was compliant with home exercise program.  Response to previous treatment: Positive  Functional change: Able to ambulate with less dependency of RW      Pain: 5/10  Location: right Leg     OBJECTIVE       Treatment     Aimee received the treatments listed below:      therapeutic exercises to develop strength and ROM for 45 minutes including:      Quad sets with towel 30x5"  Straight leg raise, 3 x 8  HS curls, 3 x 10   Heel slides 30x  LAQ 2x15 3#  Shuttle Double leg press 62# 3x12  Single leg press 37# 3x10 each  Ambulated 224 feet with single point cane and contact guard assist   Step ups on 6 inch step, 2 x 10       Not today:  Treadmill, x 5 min 1.5 mph   Standing hip abduction 2 x 10   Standing hip extension 2 x 10   Standing Terminal Knee Extension, 3 x 10 Red " "theraband       Aimee participated in gait training to improve functional mobility and safety for 0 minutes, including    Gait training with and without single point cane x 10 min     Stepping over 6" hurdles with Left Lower Extremity x5 laps there and back   Ambulation without single point cane for 250ft     Not today:   Step over gate focusing on heel strike B   Lateral step overs x 5 laps 5 seth     Aimee participated in dynamic functional therapeutic activities to improve functional performance for 10 minutes, including:    Squats to 18 inch box with 10# weight 3x10  Step ups with to 6in box 30x each  Lateral stepping without UE support, x 5 laps     Patient Education and Home Exercises     Home Exercises Provided and Patient Education Provided     Education provided:   - Continue HEP     Written Home Exercises Provided: Patient instructed to cont prior HEP. Exercises were reviewed and Aimee was able to demonstrate them prior to the end of the session.  Aimee demonstrated good  understanding of the education provided. See EMR under Patient Instructions for exercises provided during therapy sessions    ASSESSMENT     Aimee demo much improved tolerance to therapy this visit. Improved WB on R LE and improved gait noted. Continued to require encouragement to perform shuttle exercises as she was fear avoidant. Pt with good tolerance to therapy session with no adverse effects reported.        Aimee Is progressing well towards her goals.   Pt prognosis is Good.     Pt will continue to benefit from skilled outpatient physical therapy to address the deficits listed in the problem list box on initial evaluation, provide pt/family education and to maximize pt's level of independence in the home and community environment.     Pt's spiritual, cultural and educational needs considered and pt agreeable to plan of care and goals.     Anticipated barriers to physical therapy: surgery    GOALS: Short Term Goals:  4-6 " weeks  1.Report decreased leg pain  < / =  2/10  to increase tolerance for ambulation  2. Increase knee ROM to full passive in order to be able to perform ADLs without difficulty.  3. Increase strength by 1/3 MMT grade in quad  to increase tolerance for ADL and work activities.  4. Pt to tolerate HEP to improve ROM and independence with ADL's     Long Term Goals: 12-16 weeks  1.Report decreased leg pain < / = 1/10  to increase tolerance for ambulation  2.Patient goal: return to independence, going up stairs, and gardening activities  3.Increase strength to >/= 4+/5 in quad  to increase tolerance for ADL and work activities.  4. Pt will report at CJ level (20-40% impaired) on FOTO knee to demonstrate increase in LE function with every day tasks.   5. Pt will improve TUG score without AD to <14 in order to show decreased falls risk.          PLAN     Continue to treat and progress per POC and pt tolerance       Johnnie Gore, PTA

## 2022-08-01 NOTE — PROGRESS NOTES
Past Medical History:   Diagnosis Date    Anxiety     Constipation     Coronary artery disease     Esophageal stricture     GERD (gastroesophageal reflux disease)     indefinite PPI    Hyperlipidemia     Hypertension     Mitral valve prolapse     ARLETH on CPAP     Osteoporosis        Past Surgical History:   Procedure Laterality Date    APPENDECTOMY      BREAST BIOPSY      BREAST CYST ASPIRATION      BREAST SURGERY  12/2010    Reduction    cardiovascular stress testing  09/19/2017    CHOLECYSTECTOMY  12/13/2017    Laparoscopic- Dr Lai    CORONARY STENT PLACEMENT  2007    echocardiography  09/19/2017    ESOPHAGOGASTRODUODENOSCOPY      INTRAMEDULLARY RODDING OF FEMUR Right 6/5/2022    Procedure: INSERTION, INTRAMEDULLARY OJHAN, FEMUR;  Surgeon: Choco Cristobal MD;  Location: [facility];  Service: Orthopedics;  Laterality: Right;    TONSILLECTOMY      TOTAL REDUCTION MAMMOPLASTY      TUBAL LIGATION         Current Outpatient Medications   Medication Sig    aspirin (ECOTRIN) 81 MG EC tablet Take 81 mg by mouth once daily.    calcium-vitamin D3 (OS-YAHIR 500 + D3) 500 mg-5 mcg (200 unit) per tablet Take 1 tablet by mouth 2 (two) times daily with meals.     ergocalciferol (ERGOCALCIFEROL) 50,000 unit Cap Take 50,000 Units by mouth every 30 days. TWICE MONTHLY    ezetimibe (ZETIA) 10 mg tablet Take 10 mg by mouth once daily.    metoprolol succinate (TOPROL-XL) 25 MG 24 hr tablet Take 1 tablet (25 mg total) by mouth 2 (two) times a day.    multivitamin (THERAGRAN) per tablet Take 1 tablet by mouth 2 (two) times a day.     nitroGLYCERIN (NITROSTAT) 0.4 MG SL tablet Place 0.4 mg under the tongue every 5 (five) minutes as needed for Chest pain.    pantoprazole (PROTONIX) 40 MG tablet TAKE 1 TABLET BY MOUTH EVERY DAY (Patient taking differently: Take 40 mg by mouth once daily.)    rosuvastatin (CRESTOR) 40 MG Tab Take 40 mg by mouth once daily.      No current facility-administered  medications for this visit.       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  Patient underwent IM nailing.  She is doing well.  Pain is a 2/10.  Walking with just a walker.  Complaining of some knee pain with physical therapy.  Patient has a history of severe knee arthritis and was told she needed a knee replacement previously.      Review of Systems:    Musculoskeletal:  See HPI        Physical Examination:    Vital Signs:    Vitals:    08/01/22 0946   Resp: 18       Body mass index is 36.36 kg/m².    This a well-developed, well nourished patient in no acute distress.  They are alert and oriented and cooperative to examination.  Pt. walks without an antalgic gait.      Examination of the right thigh shows healed surgical incisions.  No erythema drainage.  Minimal swelling.    X-rays:  X-rays of the right femur ordered and reviewed which show a well-aligned midshaft femur fracture.  Progressive callus formation noted.   The most distal locking screw is starting to back out some but has not changed since her x-ray last month  X-rays of the right knee are ordered and reviewed which show severe medial compartment     Assessment::  Status post right IM nailing narrowing  Right knee arthritis    Plan:  Reviewed the findings with her today.  Patient may continue with physical therapy.  Weightbearing as tolerated.  Follow-up in 6 weeks with another x-ray of the right femur.  Agreed to inject her right knee just to help with her pain    This note was created using M Mod[employer] voice recognition software that occasionally misinterpreted phrases or words.

## 2022-08-04 ENCOUNTER — CLINICAL SUPPORT (OUTPATIENT)
Dept: REHABILITATION | Facility: HOSPITAL | Age: 72
End: 2022-08-04
Payer: MEDICARE

## 2022-08-04 DIAGNOSIS — M79.651 ACUTE PAIN OF RIGHT THIGH: Primary | ICD-10-CM

## 2022-08-04 DIAGNOSIS — M25.661 DECREASED RANGE OF MOTION OF RIGHT KNEE: ICD-10-CM

## 2022-08-04 PROCEDURE — 97116 GAIT TRAINING THERAPY: CPT | Mod: KX,PN

## 2022-08-04 PROCEDURE — 97110 THERAPEUTIC EXERCISES: CPT | Mod: KX,PN

## 2022-08-04 NOTE — PROGRESS NOTES
[facility]   Physical Therapy Treatment Note     Name: Aimee Palumbo  Clinic Number: [id number]    Therapy Diagnosis:   Encounter Diagnoses   Name Primary?    Acute pain of right thigh Yes    Decreased range of motion of right knee      Physician: Atilio Leary MD    Visit Date: 8/4/2022    Physician Orders: PT Eval and Treat   Medical Diagnosis from Referral: S72.321A (ICD-10-CM) - Displaced transverse fracture of shaft of right femur, initial encounter for closed fracture  Evaluation Date: 6/22/2022  Authorization Period Expiration: 12/31/22  Plan of Care Expiration: 10/20/22  Progress Note Due: 9/4/22  Visit # / Visits authorized: 11 / 20   FOTO: 55% limitation  FOTO 1st follow up: 53% limitation  FOTO 2nd follow up: 38% limitation     Precautions: Standard and Fall osteoporosis, 2 stents in 2009, blood thinner,        PTA Visit #: 0/5     FOTO first follow up:   FOTO second follow up:     Time In: 8:00  Time Out: 8:55  Total Billable Time: 25 minutes    SUBJECTIVE     Pt reports: got an injection in her knee and was diagnosed with knee OA. Feeling better today with less pain.     She was compliant with home exercise program.  Response to previous treatment: Positive  Functional change: Able to ambulate with less dependency of RW      Pain: 5/10  Location: right Leg     OBJECTIVE       Treatment     Aimee received the treatments listed below:      therapeutic exercises to develop strength and ROM for 30 minutes including:    Straight leg raise, 3 x 10  Heel slides 30x  LAQ 2x15 3#  Double leg press 50# 3x12  Hip abd machine 50# 3x15  Leg ext machine 20# 3x15  Single leg press 37# 3x10 each - NP    Not today:  Ambulated 224 feet with single point cane and contact guard assist   Step ups on 6 inch step, 2 x 10    Treadmill, x 5 min 1.5 mph   Standing hip abduction 2 x 10   Standing hip extension 2 x 10   Standing Terminal Knee Extension, 3 x 10 Red theraband  "      Aimee participated in gait training to improve functional mobility and safety for 25 minutes, including    Gait training with and without single point cane x 10 min     Stepping over 6" hurdles with Left Lower Extremity x5 laps there and back   Ambulation without single point cane for 200ft     Not today:   Step over gate focusing on heel strike B   Lateral step overs x 5 laps 5 seth     Aimee participated in dynamic functional therapeutic activities to improve functional performance for 0 minutes, including:    Squats to 18 inch box with 10# weight 3x10  Step ups with to 6in box 30x each  Lateral stepping without UE support, x 5 laps     Patient Education and Home Exercises     Home Exercises Provided and Patient Education Provided     Education provided:   - Continue HEP     Written Home Exercises Provided: Patient instructed to cont prior HEP. Exercises were reviewed and Aimee was able to demonstrate them prior to the end of the session.  Aimee demonstrated good  understanding of the education provided. See EMR under Patient Instructions for exercises provided during therapy sessions    ASSESSMENT     Aimee doing well with improved knee range of motion and able to achieve full. Ambulated hurdles without the cane today and did well, still ambulating with [place] gait and fear avoidance during ambulation. Verbal and tactile cues used throughout for proper technique. Held step ups today due to pain in right knee and held single leg exercises.        Aimee Is progressing well towards her goals.   Pt prognosis is Good.     Pt will continue to benefit from skilled outpatient physical therapy to address the deficits listed in the problem list box on initial evaluation, provide pt/family education and to maximize pt's level of independence in the home and community environment.     Pt's spiritual, cultural and educational needs considered and pt agreeable to plan of care and goals.     Anticipated " barriers to physical therapy: surgery    GOALS: Short Term Goals:  4-6 weeks  1.Report decreased leg pain  < / =  2/10  to increase tolerance for ambulation  2. Increase knee ROM to full passive in order to be able to perform ADLs without difficulty.  3. Increase strength by 1/3 MMT grade in quad  to increase tolerance for ADL and work activities.  4. Pt to tolerate HEP to improve ROM and independence with ADL's     Long Term Goals: 12-16 weeks  1.Report decreased leg pain < / = 1/10  to increase tolerance for ambulation  2.Patient goal: return to independence, going up stairs, and gardening activities  3.Increase strength to >/= 4+/5 in quad  to increase tolerance for ADL and work activities.  4. Pt will report at CJ level (20-40% impaired) on FOTO knee to demonstrate increase in LE function with every day tasks.   5. Pt will improve TUG score without AD to <14 in order to show decreased falls risk.          PLAN     Continue to treat and progress per POC and pt tolerance       Russ Sultana, PT

## 2022-08-08 ENCOUNTER — CLINICAL SUPPORT (OUTPATIENT)
Dept: REHABILITATION | Facility: HOSPITAL | Age: 72
End: 2022-08-08
Payer: MEDICARE

## 2022-08-08 DIAGNOSIS — M25.661 DECREASED RANGE OF MOTION OF RIGHT KNEE: ICD-10-CM

## 2022-08-08 DIAGNOSIS — M79.651 ACUTE PAIN OF RIGHT THIGH: Primary | ICD-10-CM

## 2022-08-08 PROCEDURE — 97110 THERAPEUTIC EXERCISES: CPT | Mod: PN,CQ

## 2022-08-08 PROCEDURE — 97116 GAIT TRAINING THERAPY: CPT | Mod: PN,CQ

## 2022-08-08 NOTE — PROGRESS NOTES
[facility]   Physical Therapy Treatment Note     Name: Aimee Palumbo  Clinic Number: [id number]    Therapy Diagnosis:   Encounter Diagnoses   Name Primary?    Acute pain of right thigh Yes    Decreased range of motion of right knee      Physician: Atilio Leary MD    Visit Date: 8/8/2022    Physician Orders: PT Eval and Treat   Medical Diagnosis from Referral: S72.321A (ICD-10-CM) - Displaced transverse fracture of shaft of right femur, initial encounter for closed fracture  Evaluation Date: 6/22/2022  Authorization Period Expiration: 12/31/22  Plan of Care Expiration: 10/20/22  Progress Note Due: 9/4/22  Visit # / Visits authorized: 11 / 20   FOTO: 55% limitation  FOTO 1st follow up: 53% limitation  FOTO 2nd follow up: 38% limitation     Precautions: Standard and Fall osteoporosis, 2 stents in 2009, blood thinner,        PTA Visit #: 1/5     FOTO first follow up:   FOTO second follow up:     Time In: 800  Time Out: 0840  Total Billable Time: 40 minutes    SUBJECTIVE     Pt reports: States she has no complaints of pain this morning and is doing well. States she has been doing well since her injection for her knee.      She was compliant with home exercise program.  Response to previous treatment: Positive  Functional change: Able to ambulate with less dependency of RW      Pain: 0/10  Location: right Leg     OBJECTIVE       Treatment     Aimee received the treatments listed below:      therapeutic exercises to develop strength and ROM for 30 minutes including:    Straight leg raise, 3 x 10  Heel slides 30x  LAQ 2x15 3#  Double leg press 60# 3x12  Hip abd machine 50# 3x15  Leg ext machine 20# 3x15  Single leg press 37# 3x10 each - NP    Not today:  Ambulated 224 feet with single point cane and contact guard assist   Step ups on 6 inch step, 2 x 10    Treadmill, x 5 min 1.5 mph   Standing hip abduction 2 x 10   Standing hip extension 2 x 10   Standing Terminal Knee  "Extension, 3 x 10 Red theraband       Aimee participated in gait training to improve functional mobility and safety for 10 minutes, including      Stepping over 6" hurdles with Left Lower Extremity x5 laps there and back   Ambulation without single point cane for 200ft     Not today:   Step over gate focusing on heel strike B   Lateral step overs x 5 laps 5 seth     Aimee participated in dynamic functional therapeutic activities to improve functional performance for 0 minutes, including:    Squats to 18 inch box with 10# weight 3x10  Step ups with to 6in box 30x each  Lateral stepping without UE support, x 5 laps     Patient Education and Home Exercises     Home Exercises Provided and Patient Education Provided     Education provided:   - Continue HEP     Written Home Exercises Provided: Patient instructed to cont prior HEP. Exercises were reviewed and Aimee was able to demonstrate them prior to the end of the session.  Aimee demonstrated good  understanding of the education provided. See EMR under Patient Instructions for exercises provided during therapy sessions    ASSESSMENT     Continues to report feeling well and progressing outside of clinic with ambulating more independently. She is joining a gym and was given exercises to perform in the pool. Pt with good tolerance to therapy session with no adverse effects reported.            Aimee Is progressing well towards her goals.   Pt prognosis is Good.     Pt will continue to benefit from skilled outpatient physical therapy to address the deficits listed in the problem list box on initial evaluation, provide pt/family education and to maximize pt's level of independence in the home and community environment.     Pt's spiritual, cultural and educational needs considered and pt agreeable to plan of care and goals.     Anticipated barriers to physical therapy: surgery    GOALS: Short Term Goals:  4-6 weeks  1.Report decreased leg pain  < / =  2/10  to " increase tolerance for ambulation  2. Increase knee ROM to full passive in order to be able to perform ADLs without difficulty.  3. Increase strength by 1/3 MMT grade in quad  to increase tolerance for ADL and work activities.  4. Pt to tolerate HEP to improve ROM and independence with ADL's     Long Term Goals: 12-16 weeks  1.Report decreased leg pain < / = 1/10  to increase tolerance for ambulation  2.Patient goal: return to independence, going up stairs, and gardening activities  3.Increase strength to >/= 4+/5 in quad  to increase tolerance for ADL and work activities.  4. Pt will report at CJ level (20-40% impaired) on FOTO knee to demonstrate increase in LE function with every day tasks.   5. Pt will improve TUG score without AD to <14 in order to show decreased falls risk.          PLAN     Continue to treat and progress per POC and pt tolerance       Johnnie Gore, PTA

## 2022-08-09 ENCOUNTER — TELEPHONE (OUTPATIENT)
Dept: ORTHOPEDICS | Facility: CLINIC | Age: 72
End: 2022-08-09
Payer: MEDICARE

## 2022-08-09 NOTE — TELEPHONE ENCOUNTER
----- Message from Dulce Callaway LPN sent at 8/9/2022  9:57 AM CDT -----  Regarding: FW: needs to speak to staff about PT, call pt   Contact: pt     ----- Message -----  From: Bobby Garrison  Sent: 8/8/2022   4:55 PM CDT  To: [facility] Staff  Subject: needs to speak to staff about PT, call pt 95#    needs to speak to staff about PT, call pt

## 2022-08-09 NOTE — TELEPHONE ENCOUNTER
Pt decided to join a gym. Wants to make sure it is okay for her to go in the pool and do rehab exercises there as well. Please advise. Pt states she has been walking without can around her house

## 2022-08-11 ENCOUNTER — CLINICAL SUPPORT (OUTPATIENT)
Dept: REHABILITATION | Facility: HOSPITAL | Age: 72
End: 2022-08-11
Payer: MEDICARE

## 2022-08-11 DIAGNOSIS — M25.661 DECREASED RANGE OF MOTION OF RIGHT KNEE: ICD-10-CM

## 2022-08-11 DIAGNOSIS — M79.651 ACUTE PAIN OF RIGHT THIGH: Primary | ICD-10-CM

## 2022-08-11 PROCEDURE — 97110 THERAPEUTIC EXERCISES: CPT | Mod: PN,CQ

## 2022-08-11 NOTE — PROGRESS NOTES
[facility]   Physical Therapy Treatment Note     Name: Aimee Palumbo  Clinic Number: [id number]    Therapy Diagnosis:   Encounter Diagnoses   Name Primary?    Acute pain of right thigh Yes    Decreased range of motion of right knee      Physician: Atilio Leary MD    Visit Date: 8/11/2022    Physician Orders: PT Eval and Treat   Medical Diagnosis from Referral: S72.321A (ICD-10-CM) - Displaced transverse fracture of shaft of right femur, initial encounter for closed fracture  Evaluation Date: 6/22/2022  Authorization Period Expiration: 12/31/22  Plan of Care Expiration: 10/20/22  Progress Note Due: 9/4/22  Visit # / Visits authorized: 13 / 20   FOTO: 55% limitation  FOTO 1st follow up: 53% limitation  FOTO 2nd follow up: 38% limitation     Precautions: Standard and Fall osteoporosis, 2 stents in 2009, blood thinner,        PTA Visit #: 2/5     FOTO first follow up:   FOTO second follow up:     Time In: 800  Time Out: 0859  Total Billable Time: 59 minutes    SUBJECTIVE     Pt reports: States she has no complaints of pain this morning and is doing well.       She was compliant with home exercise program.  Response to previous treatment: Positive  Functional change: Able to ambulate with less dependency of RW      Pain: 0/10  Location: right Leg     OBJECTIVE       Treatment     Aimee received the treatments listed below:      therapeutic exercises to develop strength and ROM for 59 minutes including:    Straight leg raise, 3 x 10  Heel slides 30x  Bridging, 3 x 10   LAQ 3x12 5#  Shuttle Double leg press 62# 3x12  Hip abd machine 50# 3x15  Leg ext machine 20# 3x15  Sled pushm 3 x 10 yds ti mimic mowing her grass      Not today:  Ambulated 224 feet with single point cane and contact guard assist   Step ups on 6 inch step, 2 x 10    Treadmill, x 5 min 1.5 mph   Standing hip abduction 2 x 10   Standing hip extension 2 x 10   Standing Terminal Knee Extension, 3 x 10 Red  "mollyd       Aimee participated in gait training to improve functional mobility and safety for 0 minutes, including      Stepping over 6" hurdles with Left Lower Extremity x5 laps there and back   Ambulation without single point cane for 200ft     Not today:   Step over gate focusing on heel strike B   Lateral step overs x 5 laps 5 seth     Aimee participated in dynamic functional therapeutic activities to improve functional performance for 0 minutes, including:    Squats to 18 inch box with 10# weight 3x10  Step ups with to 6in box 30x each  Lateral stepping without UE support, x 5 laps     Patient Education and Home Exercises     Home Exercises Provided and Patient Education Provided     Education provided:   - Continue HEP     Written Home Exercises Provided: Patient instructed to cont prior HEP. Exercises were reviewed and Aimee was able to demonstrate them prior to the end of the session.  Aimee demonstrated good  understanding of the education provided. See EMR under Patient Instructions for exercises provided during therapy sessions    ASSESSMENT     Patient able perform additional therex to mimic lawn care at her home today without any reports of pain. Patient improved her TUG and demo decreased fall risk. Discussed discharge planning with Physical therapist. Pt with good tolerance to therapy session with no adverse effects reported.              Aimee Is progressing well towards her goals.   Pt prognosis is Good.     Pt will continue to benefit from skilled outpatient physical therapy to address the deficits listed in the problem list box on initial evaluation, provide pt/family education and to maximize pt's level of independence in the home and community environment.     Pt's spiritual, cultural and educational needs considered and pt agreeable to plan of care and goals.     Anticipated barriers to physical therapy: surgery    GOALS: Short Term Goals:  4-6 weeks  1.Report decreased leg pain  " < / =  2/10  to increase tolerance for ambulation  2. Increase knee ROM to full passive in order to be able to perform ADLs without difficulty.  3. Increase strength by 1/3 MMT grade in quad  to increase tolerance for ADL and work activities.  4. Pt to tolerate HEP to improve ROM and independence with ADL's     Long Term Goals: 12-16 weeks  1.Report decreased leg pain < / = 1/10  to increase tolerance for ambulation  2.Patient goal: return to independence, going up stairs, and gardening activities  3.Increase strength to >/= 4+/5 in quad  to increase tolerance for ADL and work activities.  4. Pt will report at CJ level (20-40% impaired) on FOTO knee to demonstrate increase in LE function with every day tasks.   5. Pt will improve TUG score without AD to <14 in order to show decreased falls risk.          PLAN     Continue to treat and progress per POC and pt tolerance       Johnnie Gore, PTA

## 2022-08-15 ENCOUNTER — HOSPITAL ENCOUNTER (OUTPATIENT)
Dept: RADIOLOGY | Facility: HOSPITAL | Age: 72
Discharge: HOME | End: 2022-08-15
Attending: OBSTETRICS & GYNECOLOGY
Payer: MEDICARE

## 2022-08-15 ENCOUNTER — CLINICAL SUPPORT (OUTPATIENT)
Dept: REHABILITATION | Facility: HOSPITAL | Age: 72
End: 2022-08-15
Payer: MEDICARE

## 2022-08-15 VITALS — HEIGHT: 59 IN | BODY MASS INDEX: 36.26 KG/M2 | WEIGHT: 179.88 LBS

## 2022-08-15 DIAGNOSIS — N63.13 BREAST LUMP ON RIGHT SIDE AT 7 O'CLOCK POSITION: ICD-10-CM

## 2022-08-15 DIAGNOSIS — N63.14 BREAST LUMP ON RIGHT SIDE AT 4 O'CLOCK POSITION: ICD-10-CM

## 2022-08-15 DIAGNOSIS — M79.651 ACUTE PAIN OF RIGHT THIGH: Primary | ICD-10-CM

## 2022-08-15 DIAGNOSIS — M25.661 DECREASED RANGE OF MOTION OF RIGHT KNEE: ICD-10-CM

## 2022-08-15 PROCEDURE — 97110 THERAPEUTIC EXERCISES: CPT | Mod: KX,PN

## 2022-08-15 PROCEDURE — 76642 ULTRASOUND BREAST LIMITED: CPT | Mod: TC,PO,RT

## 2022-08-15 PROCEDURE — 77066 DX MAMMO INCL CAD BI: CPT | Mod: TC,PO

## 2022-08-15 PROCEDURE — 97530 THERAPEUTIC ACTIVITIES: CPT | Mod: KX,PN

## 2022-08-15 NOTE — PROGRESS NOTES
[facility]   Physical Therapy Treatment Note     Name: Aimee Palumbo  Clinic Number: [id number]    Therapy Diagnosis:   Encounter Diagnoses   Name Primary?    Acute pain of right thigh Yes    Decreased range of motion of right knee      Physician: Atilio Leary MD    Visit Date: 8/15/2022    Physician Orders: PT Eval and Treat   Medical Diagnosis from Referral: S72.321A (ICD-10-CM) - Displaced transverse fracture of shaft of right femur, initial encounter for closed fracture  Evaluation Date: 6/22/2022  Authorization Period Expiration: 12/31/22  Plan of Care Expiration: 10/20/22  Progress Note Due: 9/4/22  Visit # / Visits authorized: 14/ 20   FOTO: 55% limitation  FOTO 1st follow up: 53% limitation  FOTO 2nd follow up: 38% limitation     Precautions: Standard and Fall osteoporosis, 2 stents in 2009, blood thinner,        PTA Visit #: 0/5     FOTO first follow up:   FOTO second follow up:     Time In: 7:00  Time Out: 7:57  Total Billable Time: 30 minutes    SUBJECTIVE     Pt reports: doing about the same, ambulating more without the cane.        She was compliant with home exercise program.  Response to previous treatment: Positive  Functional change: Able to ambulate with less dependency of RW      Pain: 0/10  Location: right Leg     OBJECTIVE       Treatment     Observation: right lateral lean, decreased WB tolerance on right     Aimee received the treatments listed below:      therapeutic exercises to develop strength and ROM for 44minutes including:  Straight leg raise 2#, 3 x 10  Heel slides 30x  Bridging, 3 x 10   Double leg press 60# 3x12  Single leg press 40# 3x10  Hip abd machine 50# 3x15  Leg ext machine 20# 3x15  Sled pushm 3 x 10 yds to mimic mowing her grass  Standing hip abd green TB 3x10 each    Not today:  LAQ 3x12 5#  Ambulated 224 feet with single point cane and contact guard assist   Step ups on 6 inch step, 2 x 10    Treadmill, x 5 min 1.5 mph  "  Standing hip abduction 2 x 10   Standing hip extension 2 x 10   Standing Terminal Knee Extension, 3 x 10 Red theraband       Aimee participated in gait training to improve functional mobility and safety for 0 minutes, including    Stepping over 6" hurdles with Left Lower Extremity x5 laps there and back   Ambulation without single point cane     Not today:   Step over gate focusing on heel strike B   Lateral step overs x 5 laps 5 seth     Aimee participated in dynamic functional therapeutic activities to improve functional performance for 12 minutes, including:    Squats to 18 inch box with 20# weight 3x10  Step ups with to 4in box 30x each    Not Performed  Lateral stepping without UE support, x 5 laps     Patient Education and Home Exercises     Home Exercises Provided and Patient Education Provided     Education provided:   - Continue HEP     Written Home Exercises Provided: Patient instructed to cont prior HEP. Exercises were reviewed and Aimee was able to demonstrate them prior to the end of the session.  Aimee demonstrated good  understanding of the education provided. See EMR under Patient Instructions for exercises provided during therapy sessions    ASSESSMENT     Aimee did well with todays session. Progressed with LE strengthening and weight with squats. Still ambulating with lateral trunk lean secondary due to weakness of right LE. Continue to progress as tolerated.      Aimee Is progressing well towards her goals.   Pt prognosis is Good.     Pt will continue to benefit from skilled outpatient physical therapy to address the deficits listed in the problem list box on initial evaluation, provide pt/family education and to maximize pt's level of independence in the home and community environment.     Pt's spiritual, cultural and educational needs considered and pt agreeable to plan of care and goals.     Anticipated barriers to physical therapy: surgery    GOALS: Short Term Goals:  4-6 " weeks  1.Report decreased leg pain  < / =  2/10  to increase tolerance for ambulation  2. Increase knee ROM to full passive in order to be able to perform ADLs without difficulty.  3. Increase strength by 1/3 MMT grade in quad  to increase tolerance for ADL and work activities.  4. Pt to tolerate HEP to improve ROM and independence with ADL's     Long Term Goals: 12-16 weeks  1.Report decreased leg pain < / = 1/10  to increase tolerance for ambulation  2.Patient goal: return to independence, going up stairs, and gardening activities  3.Increase strength to >/= 4+/5 in quad  to increase tolerance for ADL and work activities.  4. Pt will report at CJ level (20-40% impaired) on FOTO knee to demonstrate increase in LE function with every day tasks.   5. Pt will improve TUG score without AD to <14 in order to show decreased falls risk.          PLAN     Continue to treat and progress per POC and pt tolerance       Russ Sultana, PT

## 2022-08-19 ENCOUNTER — CLINICAL SUPPORT (OUTPATIENT)
Dept: REHABILITATION | Facility: HOSPITAL | Age: 72
End: 2022-08-19
Payer: MEDICARE

## 2022-08-19 DIAGNOSIS — M79.651 ACUTE PAIN OF RIGHT THIGH: Primary | ICD-10-CM

## 2022-08-19 PROCEDURE — 97110 THERAPEUTIC EXERCISES: CPT | Mod: KX,PN

## 2022-08-19 PROCEDURE — 97530 THERAPEUTIC ACTIVITIES: CPT | Mod: KX,PN

## 2022-08-19 NOTE — PROGRESS NOTES
"    [facility]   Physical Therapy Treatment Note     Name: Aimee Palumbo  Clinic Number: [id number]    Therapy Diagnosis:   Encounter Diagnosis   Name Primary?    Acute pain of right thigh Yes     Physician: Atilio Leary MD    Visit Date: 8/19/2022    Physician Orders: PT Eval and Treat   Medical Diagnosis from Referral: S72.321A (ICD-10-CM) - Displaced transverse fracture of shaft of right femur, initial encounter for closed fracture  Evaluation Date: 6/22/2022  Authorization Period Expiration: 12/31/22  Plan of Care Expiration: 10/20/22  Progress Note Due: 9/4/22  Visit # / Visits authorized: 15/ 20   FOTO: 55% limitation  FOTO 1st follow up: 53% limitation  FOTO 2nd follow up: 38% limitation  Apply KX modifier     Precautions: Standard and Fall osteoporosis, 2 stents in 2009, blood thinner,      PTA Visit #: 0/5     Time In: 2:35 pm  Time Out: 3:30 pm  Total Billable Time: 45 minutes    SUBJECTIVE     Pt reports: "I was able to go to the gym. They were able to show me around and set me up on some of the leg machines so that I could do them safely. I am still trying to get off the cane. I may have like a 1-2/10 twinge of pain every now and then, but it is not all the time." PT acknowledges.         She was compliant with home exercise program.    Response to previous treatment: The patient enters with no new complaints.   Functional change: The patient reports being able to return to the gym.      Pain: 0/10 upon entering this day.   Location: right Leg     OBJECTIVE       Treatment     Aimee received the treatments listed below:      therapeutic exercises to develop strength and ROM for 30 minutes including:  -+Nu-step on level 4 x 6 minutes  Double leg press 50# 3 x12  Single leg press 30# 3x10  -+black band reverse walking x 3 minutes  -+black band forward walking x 3 minutes  -+lateral stepping left and right with band x 3 minutes  Hip abduction machine 50# 3 x15  Leg " "extension machine 20# 3 x10  -+heel raises with red ball adduction x 20    Not today:  Straight leg raise 2#, 3 x 10  Heel slides 30x  Bridging, 3 x 10   Sled pushm 3 x 10 yds to mimic mowing her grass  Standing hip abd green TB 3x10 each  LAQ 3x12 5#  Ambulated 224 feet with single point cane and contact guard assist   Step ups on 6 inch step, 2 x 10    Treadmill, x 5 min 1.5 mph   Standing hip abduction 2 x 10   Standing hip extension 2 x 10   Standing Terminal Knee Extension, 3 x 10 Red theraband     Aimee participated in gait training to improve functional mobility and safety for 0 minutes, including  Stepping over 6" hurdles with Left Lower Extremity x5 laps there and back   Ambulation without single point cane     Not today:   Step over gate focusing on heel strike B   Lateral step overs x 5 laps 5 seth     Aimee participated in dynamic functional therapeutic activities to improve functional performance for 15 minutes, including:  -+sit to stand from 3rd black box holding yellow weighted ball on blue foam x 20  -Squats to 18 inch box with 20# kettle bell x 20  -Step ups  to 4 inch box 20 x each  -+Precor back extension with 40 pounds x 20    Patient Education and Home Exercises     Home Exercises Provided and Patient Education Provided     Education provided:   - Continue HEP     Written Home Exercises Provided: Patient instructed to cont prior HEP. Exercises were reviewed and Aimee was able to demonstrate them prior to the end of the session.  Aimee demonstrated good  understanding of the education provided. See EMR under Patient Instructions for exercises provided during therapy sessions    ASSESSMENT     Today, the patient tolerated therapy well. She was able to advance her resistances at times and also started dynamic standing resistance exercises to help with her strength and balance. She did well. No loss of balance was noted. She need continued gluteal strengthening to help improve her pelvic " lean. She worked hard. She can benefit from continued progression as she tolerates.       Aimee Is progressing well towards her goals.   Pt prognosis is Good.     Pt will continue to benefit from skilled outpatient physical therapy to address the deficits listed in the problem list box on initial evaluation, provide pt/family education and to maximize pt's level of independence in the home and community environment.     Pt's spiritual, cultural and educational needs considered and pt agreeable to plan of care and goals.     Anticipated barriers to physical therapy: surgery    GOALS: Short Term Goals:  4-6 weeks  1.Report decreased leg pain  < / =  2/10  to increase tolerance for ambulation  2. Increase knee ROM to full passive in order to be able to perform ADLs without difficulty.  3. Increase strength by 1/3 MMT grade in quad  to increase tolerance for ADL and work activities.  4. Pt to tolerate HEP to improve ROM and independence with ADL's     Long Term Goals: 12-16 weeks  1.Report decreased leg pain < / = 1/10  to increase tolerance for ambulation  2.Patient goal: return to independence, going up stairs, and gardening activities  3.Increase strength to >/= 4+/5 in quad  to increase tolerance for ADL and work activities.  4. Pt will report at CJ level (20-40% impaired) on FOTO knee to demonstrate increase in LE function with every day tasks.   5. Pt will improve TUG score without AD to <14 in order to show decreased falls risk.      PLAN     Plan to continue with right leg and pelvic strengthening to normalize her gait pattern and allow her to safely return to a desired level of function.        Dinh Larkin, PT

## 2022-08-22 ENCOUNTER — CLINICAL SUPPORT (OUTPATIENT)
Dept: REHABILITATION | Facility: HOSPITAL | Age: 72
End: 2022-08-22
Payer: MEDICARE

## 2022-08-22 DIAGNOSIS — M25.661 DECREASED RANGE OF MOTION OF RIGHT KNEE: Primary | ICD-10-CM

## 2022-08-22 DIAGNOSIS — M79.651 ACUTE PAIN OF RIGHT THIGH: ICD-10-CM

## 2022-08-22 PROCEDURE — 97110 THERAPEUTIC EXERCISES: CPT | Mod: KX,PN

## 2022-08-22 PROCEDURE — 97530 THERAPEUTIC ACTIVITIES: CPT | Mod: KX,PN

## 2022-08-22 NOTE — PROGRESS NOTES
"    [facility]   Physical Therapy Treatment Note     Name: Aimee Palumbo  Clinic Number: [id number]    Therapy Diagnosis:   Encounter Diagnoses   Name Primary?    Acute pain of right thigh     Decreased range of motion of right knee Yes     Physician: Atilio Leary MD    Visit Date: 8/22/2022    Physician Orders: PT Eval and Treat   Medical Diagnosis from Referral: S72.321A (ICD-10-CM) - Displaced transverse fracture of shaft of right femur, initial encounter for closed fracture  Evaluation Date: 6/22/2022  Authorization Period Expiration: 12/31/22  Plan of Care Expiration: 10/20/22  Progress Note Due: 9/4/22  Visit # / Visits authorized: 16/ 20   FOTO: 55% limitation  FOTO 1st follow up: 53% limitation  FOTO 2nd follow up: 38% limitation  Apply KX modifier     Precautions: Standard and Fall osteoporosis, 2 stents in 2009, blood thinner,      PTA Visit #: 0/5     Time In: 7:44 am  Time Out: 8:35 am  Total Billable Time: 40 minutes  SUBJECTIVE     Pt reports: "I went to the pool yesterday for the water aerobics that you mentioned. It was wonderful. Also, I hired a  to help me at the gym to make sure I am doing the exercises correctly. I am going to slowly try to keep weaning off the cane too. No real pain right now in the hip or leg." PT acknowledges.         She was compliant with home exercise program.    Response to previous treatment: The patient reports having no current right leg pain.   Functional change: The patient reports that she enjoyed her new water aerobics class.       Pain: 0/10 upon entering this day.   Location: right Leg   OBJECTIVE       Treatment     Aimee received the treatments listed below:      therapeutic exercises to develop strength and ROM for 30 minutes including:  -Nu-step on level 4 x 6 minutes  -Leg extension machine 20# 3 x10  -+Precor knee flexion with 25 pounds x 30  -Hip abduction machine 50# 3 x 15  -+Precor hip adduction with 40 " "pounds x 20  -Double leg press 50 pounds 3 x 12 with seat on 10  -Single leg press 30 pounds  3 x10 done bilaterally   -heel raises with red ball adduction x 20    Not today:  -black band reverse walking x 3 minutes  -black band forward walking x 3 minutes  -lateral stepping left and right with band x 3 minutes  Straight leg raise 2#, 3 x 10  Heel slides 30x  Bridging, 3 x 10   Sled pushm 3 x 10 yds to mimic mowing her grass  Standing hip abd green TB 3x10 each  LAQ 3x12 5#  Ambulated 224 feet with single point cane and contact guard assist   Step ups on 6 inch step, 2 x 10    Treadmill, x 5 min 1.5 mph   Standing hip abduction 2 x 10   Standing hip extension 2 x 10   Standing Terminal Knee Extension, 3 x 10 Red theraband     Aimee participated in gait training to improve functional mobility and safety for 0 minutes, including  Stepping over 6" hurdles with Left Lower Extremity x5 laps there and back   Ambulation without single point cane   Not today:   Step over gate focusing on heel strike B   Lateral step overs x 5 laps 5 seth     Aimee participated in dynamic functional therapeutic activities to improve functional performance for 10 minutes, including:  -sit to stand from 3rd black box holding yellow weighted ball on blue foam x 20  -Step ups  to 4 inch box 20 x each  -Precor back extension with 40 pounds x 25    -Squats to 18 inch box with 20# kettle bell x 20 NP  Patient Education and Home Exercises     Home Exercises Provided and Patient Education Provided     Education provided:   - Continue HEP     Written Home Exercises Provided: Patient instructed to cont prior HEP. Exercises were reviewed and Aimee was able to demonstrate them prior to the end of the session.  Aimee demonstrated good  understanding of the education provided. See EMR under Patient Instructions for exercises provided during therapy sessions    ASSESSMENT     Today, the patient was again able to safely progress some resistances and " add some new exercises. She was fearful at times during forward step ups with the right leg, but she improved as the repetitions increased. There was no loss of balance noted. She still has a Trendelenburg like gait when she is fatigued. PT expects her gait to improve as she continues to work on gluteal strengthening and standing activity endurance. She tolerated therapy well again today. She needs continued gluteal strengthening to help improve her pelvic lean. She can benefit from continued progression of her program.       Aimee Is progressing well towards her goals.   Pt prognosis is Good.     Pt will continue to benefit from skilled outpatient physical therapy to address the deficits listed in the problem list box on initial evaluation, provide pt/family education and to maximize pt's level of independence in the home and community environment.     Pt's spiritual, cultural and educational needs considered and pt agreeable to plan of care and goals.     Anticipated barriers to physical therapy: surgery    GOALS: Short Term Goals:  4-6 weeks  1.Report decreased leg pain  < / =  2/10  to increase tolerance for ambulation  2. Increase knee ROM to full passive in order to be able to perform ADLs without difficulty.  3. Increase strength by 1/3 MMT grade in quad  to increase tolerance for ADL and work activities.  4. Pt to tolerate HEP to improve ROM and independence with ADL's     Long Term Goals: 12-16 weeks  1.Report decreased leg pain < / = 1/10  to increase tolerance for ambulation  2.Patient goal: return to independence, going up stairs, and gardening activities  3.Increase strength to >/= 4+/5 in quad  to increase tolerance for ADL and work activities.  4. Pt will report at CJ level (20-40% impaired) on FOTO knee to demonstrate increase in LE function with every day tasks.   5. Pt will improve TUG score without AD to <14 in order to show decreased falls risk.      PLAN     Plan to continue with right leg and  pelvic strengthening to normalize her gait pattern and allow her to safely return to a desired level of function.        Dinh Larkin, PT

## 2022-09-07 DIAGNOSIS — M17.11 PRIMARY OSTEOARTHRITIS OF RIGHT KNEE: Primary | ICD-10-CM

## 2022-09-19 ENCOUNTER — OFFICE VISIT (OUTPATIENT)
Dept: ORTHOPEDICS | Facility: CLINIC | Age: 72
End: 2022-09-19
Payer: MEDICARE

## 2022-09-19 ENCOUNTER — HOSPITAL ENCOUNTER (OUTPATIENT)
Dept: RADIOLOGY | Facility: HOSPITAL | Age: 72
Discharge: HOME | End: 2022-09-19
Attending: ORTHOPAEDIC SURGERY
Payer: MEDICARE

## 2022-09-19 ENCOUNTER — CLINICAL SUPPORT (OUTPATIENT)
Dept: REHABILITATION | Facility: HOSPITAL | Age: 72
End: 2022-09-19
Payer: MEDICARE

## 2022-09-19 VITALS — RESPIRATION RATE: 18 BRPM | WEIGHT: 179.88 LBS | BODY MASS INDEX: 36.26 KG/M2 | HEIGHT: 59 IN

## 2022-09-19 DIAGNOSIS — M17.11 PRIMARY OSTEOARTHRITIS OF RIGHT KNEE: ICD-10-CM

## 2022-09-19 DIAGNOSIS — S72.331D CLOSED DISPLACED OBLIQUE FRACTURE OF SHAFT OF RIGHT FEMUR WITH ROUTINE HEALING, SUBSEQUENT ENCOUNTER: ICD-10-CM

## 2022-09-19 DIAGNOSIS — M79.651 ACUTE PAIN OF RIGHT THIGH: Primary | ICD-10-CM

## 2022-09-19 DIAGNOSIS — M25.661 DECREASED RANGE OF MOTION OF RIGHT KNEE: ICD-10-CM

## 2022-09-19 DIAGNOSIS — M17.11 PRIMARY OSTEOARTHRITIS OF RIGHT KNEE: Primary | ICD-10-CM

## 2022-09-19 PROCEDURE — 99213 PR OFFICE/OUTPT VISIT, EST, LEVL III, 20-29 MIN: ICD-10-PCS | Mod: S$PBB,,, | Performed by: ORTHOPAEDIC SURGERY

## 2022-09-19 PROCEDURE — 97530 THERAPEUTIC ACTIVITIES: CPT | Mod: KX,PN

## 2022-09-19 PROCEDURE — 99213 OFFICE O/P EST LOW 20 MIN: CPT | Mod: S$PBB,,, | Performed by: ORTHOPAEDIC SURGERY

## 2022-09-19 PROCEDURE — 99999 PR PBB SHADOW E&M-EST. PATIENT-LVL III: CPT | Mod: PBBFAC,,, | Performed by: ORTHOPAEDIC SURGERY

## 2022-09-19 PROCEDURE — 73552 X-RAY EXAM OF FEMUR 2/>: CPT | Mod: 26,RT,, | Performed by: RADIOLOGY

## 2022-09-19 PROCEDURE — 73552 X-RAY EXAM OF FEMUR 2/>: CPT | Mod: TC,PN,RT

## 2022-09-19 PROCEDURE — 73552 XR FEMUR 2 VIEW RIGHT: ICD-10-PCS | Mod: 26,RT,, | Performed by: RADIOLOGY

## 2022-09-19 PROCEDURE — 99999 PR PBB SHADOW E&M-EST. PATIENT-LVL III: ICD-10-PCS | Mod: PBBFAC,,, | Performed by: ORTHOPAEDIC SURGERY

## 2022-09-19 PROCEDURE — 99213 OFFICE O/P EST LOW 20 MIN: CPT | Mod: PBBFAC,PN | Performed by: ORTHOPAEDIC SURGERY

## 2022-09-19 PROCEDURE — 97110 THERAPEUTIC EXERCISES: CPT | Mod: KX,PN

## 2022-09-19 PROCEDURE — 97140 MANUAL THERAPY 1/> REGIONS: CPT | Mod: KX,PN

## 2022-09-19 NOTE — PROGRESS NOTES
Past Medical History:   Diagnosis Date    Anxiety     Constipation     Coronary artery disease     Esophageal stricture     GERD (gastroesophageal reflux disease)     indefinite PPI    Hyperlipidemia     Hypertension     Mitral valve prolapse     ARLETH on CPAP     Osteoporosis        Past Surgical History:   Procedure Laterality Date    APPENDECTOMY      BREAST BIOPSY      BREAST CYST ASPIRATION      BREAST SURGERY  12/2010    Reduction    cardiovascular stress testing  09/19/2017    CHOLECYSTECTOMY  12/13/2017    Laparoscopic- Dr Lai    CORONARY STENT PLACEMENT  2007    echocardiography  09/19/2017    ESOPHAGOGASTRODUODENOSCOPY      INTRAMEDULLARY RODDING OF FEMUR Right 6/5/2022    Procedure: INSERTION, INTRAMEDULLARY JOHAN, FEMUR;  Surgeon: Choco Cristobal MD;  Location: [facility];  Service: Orthopedics;  Laterality: Right;    TONSILLECTOMY      TOTAL REDUCTION MAMMOPLASTY      TUBAL LIGATION         Current Outpatient Medications   Medication Sig    aspirin (ECOTRIN) 81 MG EC tablet Take 81 mg by mouth once daily.    calcium-vitamin D3 (OS-YAHIR 500 + D3) 500 mg-5 mcg (200 unit) per tablet Take 1 tablet by mouth 2 (two) times daily with meals.     ergocalciferol (ERGOCALCIFEROL) 50,000 unit Cap Take 50,000 Units by mouth every 30 days. TWICE MONTHLY    ezetimibe (ZETIA) 10 mg tablet Take 10 mg by mouth once daily.    metoprolol succinate (TOPROL-XL) 25 MG 24 hr tablet Take 1 tablet (25 mg total) by mouth 2 (two) times a day.    multivitamin (THERAGRAN) per tablet Take 1 tablet by mouth 2 (two) times a day.     nitroGLYCERIN (NITROSTAT) 0.4 MG SL tablet Place 0.4 mg under the tongue every 5 (five) minutes as needed for Chest pain.    pantoprazole (PROTONIX) 40 MG tablet TAKE 1 TABLET BY MOUTH EVERY DAY (Patient taking differently: Take 40 mg by mouth once daily.)    rosuvastatin (CRESTOR) 40 MG Tab Take 40 mg by mouth once daily.      No current facility-administered medications for this visit.       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  Patient underwent IM nailing.  She is doing well.  Pain is a 2/10.  Walking without any kind of assistive device.  Knees is no longer hurting.  Patient has a history of severe knee arthritis and was told she needed a knee replacement previously.      Review of Systems:    Musculoskeletal:  See HPI        Physical Examination:    Vital Signs:    Vitals:    09/19/22 1348   Resp: 18       Body mass index is 36.33 kg/m².    This a well-developed, well nourished patient in no acute distress.  They are alert and oriented and cooperative to examination.  Pt. walks without an antalgic gait.      Examination of the right thigh shows healed surgical incisions.  No erythema drainage.  Minimal swelling.    X-rays:  X-rays of the right femur ordered and reviewed which show a well-aligned midshaft femur fracture.  Progressive callus formation noted.  The most distal locking screw is starting to back out some but has not changed since her initial postop  x-ray.  X-rays of the right knee are  reviewed which show severe medial compartment     Assessment::  Status post right IM nailing narrowing  Right knee arthritis    Plan:  Reviewed the findings with her today.  Patient may continue with physical therapy.  Weightbearing as tolerated.  Follow-up as needed.    This note was created using M Modal voice recognition software that occasionally misinterpreted phrases or words.

## 2022-09-19 NOTE — PROGRESS NOTES
[facility]   Physical Therapy Treatment Note     Name: Aimee Palumbo  Clinic Number: [id number]    Therapy Diagnosis:   Encounter Diagnoses   Name Primary?    Acute pain of right thigh Yes    Decreased range of motion of right knee        Physician: Atilio Leary MD    Visit Date: 9/19/2022    Physician Orders: PT Eval and Treat   Medical Diagnosis from Referral: S72.321A (ICD-10-CM) - Displaced transverse fracture of shaft of right femur, initial encounter for closed fracture  Evaluation Date: 6/22/2022  Authorization Period Expiration: 12/31/22  Plan of Care Expiration: 10/20/22  Progress Note Due: 9/4/22  Visit # / Visits authorized: 17/ 20   FOTO: 55% limitation  FOTO 1st follow up: 53% limitation  FOTO 2nd follow up: 38% limitation  Apply KX modifier     Precautions: Standard and Fall osteoporosis, 2 stents in 2009, blood thinner,      PTA Visit #: 0/5     Time In: 7:00  Time Out: 7:59  Total Billable Time: 59 minutes  SUBJECTIVE     Pt reports: has been out for a month due to issues with her car, back, and getting eye procedure done. Has been going to the gym and doing water aerobics classes that has been going well. Has not been doing exercises much at home. She does get some discomfort and dull/ache during ambulation.     She was compliant with home exercise program.    Response to previous treatment: The patient reports having no current right leg pain.   Functional change: The patient reports that she enjoyed her new water aerobics class.       Pain: 0/10 upon entering this day.   Location: right Leg   OBJECTIVE     Range of Motion (Active):   Knee Right  Left    Flexion 115 125   Extension 0 0       Lower Extremity Strength  Right LE   Left LE     Quadriceps: 4/5 Quadriceps: 4/5   Hamstrings: 4/5 Hamstrings: 4/5   Hip flexion (seated): 4-/5 Hip flexion (seated): 4/5   Hip Abd: 3-/5 Hip Abd: 3+/5     Observation: right antalgic gait, right lateral lean, decreased  "right knee control during swing/mid stance.     Treatment     Aimee received the treatments listed below:      therapeutic exercises to develop strength and ROM for 31 minutes including:  Bridges 20x5"  SLR 2# 3x12  Shuttle double leg press 75# 3x15  Shuttle single leg press 25# 3x10    Aimee received the following manual therapy techniques: Joint mobilizations were applied to the: knee for 14 minutes, including:  Hip/knee assessment  Gait assessment  Long axis distraction to right hip grades I-III    Aimee participated in gait training to improve functional mobility and safety for 0 minutes, including    Aimee participated in dynamic functional therapeutic activities to improve functional performance for 15 minutes, including:  sit to stand from 18 inch box with 10# weight  Step ups  to 4 inch box 2x10 each  - focus on eccentric control    Patient Education and Home Exercises     Home Exercises Provided and Patient Education Provided     Education provided:   - Continue HEP     Written Home Exercises Provided: Patient instructed to cont prior HEP. Exercises were reviewed and Aimee was able to demonstrate them prior to the end of the session.  Aimee demonstrated good  understanding of the education provided. See EMR under Patient Instructions for exercises provided during therapy sessions    ASSESSMENT     Aimee did well with todays session and returned following 1 month absence. She showed improvements with LE strength, gait, and functional activities. She is still having difficulty with some discomfort during ambulation and with stair ascent/descent. Updated HEP and focused on improving functional strengthening. Continue to progress as tolerated.     Aimee Is progressing well towards her goals.   Pt prognosis is Good.     Pt will continue to benefit from skilled outpatient physical therapy to address the deficits listed in the problem list box on initial evaluation, provide pt/family education and " to maximize pt's level of independence in the home and community environment.     Pt's spiritual, cultural and educational needs considered and pt agreeable to plan of care and goals.     Anticipated barriers to physical therapy: surgery    GOALS: Short Term Goals:  4-6 weeks - MET  1.Report decreased leg pain  < / =  2/10  to increase tolerance for ambulation  2. Increase knee ROM to full passive in order to be able to perform ADLs without difficulty.  3. Increase strength by 1/3 MMT grade in quad  to increase tolerance for ADL and work activities.  4. Pt to tolerate HEP to improve ROM and independence with ADL's     Long Term Goals: 12-16 weeks - progressing not MET  1.Report decreased leg pain < / = 1/10  to increase tolerance for ambulation  2.Patient goal: return to independence, going up stairs, and gardening activities  3.Increase strength to >/= 4+/5 in quad  to increase tolerance for ADL and work activities.  4. Pt will report at CJ level (20-40% impaired) on FOTO knee to demonstrate increase in LE function with every day tasks.   5. Pt will improve TUG score without AD to <14 in order to show decreased falls risk.      PLAN     Plan to continue with right leg and pelvic strengthening to normalize her gait pattern and allow her to safely return to a desired level of function.        Russ Sultana, PT

## 2023-03-16 ENCOUNTER — TELEPHONE (OUTPATIENT)
Dept: ORTHOPEDICS | Facility: CLINIC | Age: 73
End: 2023-03-16
Payer: MEDICARE

## 2023-03-16 NOTE — TELEPHONE ENCOUNTER
----- Message from Paola Cotton sent at 3/16/2023 10:06 AM CDT -----  Who Called: Pt    What is the request in detail: Requesting call back to discuss Thursday and Friday office hours. Please advise    Can the clinic reply by [facility]? No    Best Call Back Number: [phone]      Additional Information:

## 2023-03-21 ENCOUNTER — TELEPHONE (OUTPATIENT)
Dept: ORTHOPEDICS | Facility: CLINIC | Age: 73
End: 2023-03-21
Payer: MEDICARE

## 2023-08-01 NOTE — PLAN OF CARE
Problem: Occupational Therapy  Goal: Occupational Therapy Goal  Description: Goals to be met by:      Patient will increase functional independence with ADLs by performing:    LE Dressing with Supervision and Assistive Devices as needed.  Grooming while standing at sink with Supervision.  Toileting from toilet with Supervision for hygiene and clothing management.   Supine to sit with Supervision.  Toilet transfer to toilet with Supervision.    Outcome: Ongoing, Progressing      Medical Necessity Information: LCD Guidelines vary from payer to payer. Please check with your payer's policy to determine medical necessity. Medical Necessity Clause: Botulinum toxin hyperhidrosis therapy is medically necessary because Detail Level: Zone

## 2023-08-21 DIAGNOSIS — Z12.31 ENCOUNTER FOR SCREENING MAMMOGRAM FOR MALIGNANT NEOPLASM OF BREAST: Primary | ICD-10-CM

## 2023-09-01 DIAGNOSIS — R74.8 ELEVATED LIVER ENZYMES: Primary | ICD-10-CM

## 2023-09-12 ENCOUNTER — HOSPITAL ENCOUNTER (OUTPATIENT)
Dept: RADIOLOGY | Facility: HOSPITAL | Age: 73
Discharge: HOME | End: 2023-09-12
Attending: OBSTETRICS & GYNECOLOGY
Payer: MEDICARE

## 2023-09-12 ENCOUNTER — HOSPITAL ENCOUNTER (OUTPATIENT)
Dept: RADIOLOGY | Facility: HOSPITAL | Age: 73
Discharge: HOME | End: 2023-09-12
Attending: INTERNAL MEDICINE
Payer: MEDICARE

## 2023-09-12 DIAGNOSIS — R74.8 ELEVATED LIVER ENZYMES: ICD-10-CM

## 2023-09-12 DIAGNOSIS — Z12.31 ENCOUNTER FOR SCREENING MAMMOGRAM FOR MALIGNANT NEOPLASM OF BREAST: ICD-10-CM

## 2023-09-12 PROCEDURE — 76705 ECHO EXAM OF ABDOMEN: CPT | Mod: TC,PO

## 2023-09-12 PROCEDURE — 77067 SCR MAMMO BI INCL CAD: CPT | Mod: TC,PO

## 2023-10-27 ENCOUNTER — TELEPHONE (OUTPATIENT)
Dept: PULMONOLOGY | Facility: CLINIC | Age: 73
End: 2023-10-27
Payer: MEDICARE

## 2023-10-27 NOTE — TELEPHONE ENCOUNTER
Pt states that she sees Dr Guidry and the last few times she went they have not been able to download her usage from her sim card   advised that she would need to reach out to her DME company and explain the issue to them  she may need a new machine or they can help her correct the problem   pt states that she made an appt with Dr De Anda because her  use to see him and she didn't know what else to do   pt will contact the dme co and will cancel this appt

## 2023-10-27 NOTE — TELEPHONE ENCOUNTER
----- Message from Liudmila Angeles sent at 10/27/2023 12:42 PM CDT -----  Regarding: Needs return call  Type: Needs Medical Advice  Who Called:  Aimee Cordova Call Back Number: [phone]    Additional Information: Pt scheduled for 11/20, she wanted a call from the nurse in the offfice to ask a question before coming in, please advsie.

## 2023-11-24 DIAGNOSIS — M25.552 PAIN OF LEFT HIP: Primary | ICD-10-CM

## 2023-11-28 ENCOUNTER — OFFICE VISIT (OUTPATIENT)
Dept: ORTHOPEDICS | Facility: CLINIC | Age: 73
End: 2023-11-28
Payer: MEDICARE

## 2023-11-28 ENCOUNTER — HOSPITAL ENCOUNTER (OUTPATIENT)
Dept: RADIOLOGY | Facility: HOSPITAL | Age: 73
Discharge: HOME | End: 2023-11-28
Attending: ORTHOPAEDIC SURGERY
Payer: MEDICARE

## 2023-11-28 VITALS — WEIGHT: 179.88 LBS | HEIGHT: 59 IN | BODY MASS INDEX: 36.26 KG/M2

## 2023-11-28 DIAGNOSIS — M25.552 PAIN OF LEFT HIP: ICD-10-CM

## 2023-11-28 DIAGNOSIS — M54.32 SCIATICA, LEFT SIDE: Primary | ICD-10-CM

## 2023-11-28 PROCEDURE — 73502 XR HIP WITH PELVIS WHEN PERFORMED, 2 OR 3 VIEWS LEFT: ICD-10-PCS | Mod: 26,LT,, | Performed by: RADIOLOGY

## 2023-11-28 PROCEDURE — 73502 X-RAY EXAM HIP UNI 2-3 VIEWS: CPT | Mod: TC,PO,LT

## 2023-11-28 PROCEDURE — 99204 PR OFFICE/OUTPT VISIT, NEW, LEVL IV, 45-59 MIN: ICD-10-PCS | Mod: S$PBB,,, | Performed by: ORTHOPAEDIC SURGERY

## 2023-11-28 PROCEDURE — 99213 OFFICE O/P EST LOW 20 MIN: CPT | Mod: PBBFAC,PO | Performed by: ORTHOPAEDIC SURGERY

## 2023-11-28 PROCEDURE — 73502 X-RAY EXAM HIP UNI 2-3 VIEWS: CPT | Mod: 26,LT,, | Performed by: RADIOLOGY

## 2023-11-28 PROCEDURE — 99999 PR PBB SHADOW E&M-EST. PATIENT-LVL III: CPT | Mod: PBBFAC,,, | Performed by: ORTHOPAEDIC SURGERY

## 2023-11-28 PROCEDURE — 99999 PR PBB SHADOW E&M-EST. PATIENT-LVL III: ICD-10-PCS | Mod: PBBFAC,,, | Performed by: ORTHOPAEDIC SURGERY

## 2023-11-28 PROCEDURE — 99204 OFFICE O/P NEW MOD 45 MIN: CPT | Mod: S$PBB,,, | Performed by: ORTHOPAEDIC SURGERY

## 2023-11-29 ENCOUNTER — TELEPHONE (OUTPATIENT)
Dept: FAMILY MEDICINE | Facility: CLINIC | Age: 73
End: 2023-11-29
Payer: MEDICARE

## 2023-12-08 ENCOUNTER — OFFICE VISIT (OUTPATIENT)
Dept: SPINE | Facility: CLINIC | Age: 73
End: 2023-12-08
Payer: MEDICARE

## 2023-12-08 ENCOUNTER — TELEPHONE (OUTPATIENT)
Dept: SPINE | Facility: CLINIC | Age: 73
End: 2023-12-08

## 2023-12-08 ENCOUNTER — HOSPITAL ENCOUNTER (OUTPATIENT)
Dept: RADIOLOGY | Facility: HOSPITAL | Age: 73
Discharge: HOME | End: 2023-12-08
Attending: PHYSICAL MEDICINE & REHABILITATION
Payer: MEDICARE

## 2023-12-08 VITALS — BODY MASS INDEX: 36.26 KG/M2 | WEIGHT: 179.88 LBS | HEIGHT: 59 IN

## 2023-12-08 DIAGNOSIS — M54.42 CHRONIC BILATERAL LOW BACK PAIN WITH LEFT-SIDED SCIATICA: Primary | ICD-10-CM

## 2023-12-08 DIAGNOSIS — M54.42 CHRONIC BILATERAL LOW BACK PAIN WITH LEFT-SIDED SCIATICA: ICD-10-CM

## 2023-12-08 DIAGNOSIS — G89.29 CHRONIC BILATERAL LOW BACK PAIN WITH LEFT-SIDED SCIATICA: Primary | ICD-10-CM

## 2023-12-08 DIAGNOSIS — M54.32 SCIATICA, LEFT SIDE: ICD-10-CM

## 2023-12-08 DIAGNOSIS — G89.29 CHRONIC BILATERAL LOW BACK PAIN WITH LEFT-SIDED SCIATICA: ICD-10-CM

## 2023-12-08 PROCEDURE — 99214 PR OFFICE/OUTPT VISIT, EST, LEVL IV, 30-39 MIN: ICD-10-PCS | Mod: S$GLB,,, | Performed by: PHYSICAL MEDICINE & REHABILITATION

## 2023-12-08 PROCEDURE — 99214 OFFICE O/P EST MOD 30 MIN: CPT | Mod: S$GLB,,, | Performed by: PHYSICAL MEDICINE & REHABILITATION

## 2023-12-08 PROCEDURE — 72114 X-RAY EXAM L-S SPINE BENDING: CPT | Mod: TC,PO

## 2023-12-08 NOTE — TELEPHONE ENCOUNTER
VM left letting pt know per Dr. Hunt   Her x-rays show mild expected degenerative changes of the lumbar spine with no fracture or malalignment.  Proceed with physical therapy

## 2023-12-08 NOTE — PROGRESS NOTES
SUBJECTIVE:    Patient ID: Aimee Palumbo is a 73 y.o. female.    Chief Complaint: Low-back Pain    This is a 73-year-old woman who is currently between primary care physicians.  Has a history of coronary artery disease status post stent.  Dr. York is her cardiologist.  No cancer history.  I get the impression she is had a long history of intermittent low back pain at the lumbosacral junction.  She is been going to a chiropractor for that issue for years.  Recently she has developed pain radiating down the left leg to the lateral portion of the left foot.  Chiropractor gave her some stretches to do but so far has not been beneficial.  She denies any bowel or bladder dysfunction fever chills sweats or unexpected weight loss.  Occasionally takes Tylenol for pain.  She is not having any discomfort at this time.  Symptoms tend to occur when she bends forward.  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   Tobacco Use    Smoking status: Never    Smokeless tobacco: Never   Substance and Sexual Activity    Alcohol use: Yes     Comment: occ    Drug use: No    Sexual activity: Never   Social History Narrative    Son lives with her     Social Determinants of Health     Stress: No Stress Concern Present (9/30/2019)    [employer] - Occupational Stress Questionnaire     Feeling of Stress : Not at all     Past Surgical History:   Procedure Laterality Date    APPENDECTOMY      BREAST BIOPSY      BREAST CYST ASPIRATION      BREAST SURGERY  12/2010    Reduction    cardiovascular stress testing  09/19/2017     "CHOLECYSTECTOMY  12/13/2017    Laparoscopic- Dr Lai    CORONARY STENT PLACEMENT  2007    echocardiography  09/19/2017    ESOPHAGOGASTRODUODENOSCOPY      INTRAMEDULLARY RODDING OF FEMUR Right 6/5/2022    Procedure: INSERTION, INTRAMEDULLARY JOHAN, FEMUR;  Surgeon: Choco Cristobal MD;  Location: [facility];  Service: Orthopedics;  Laterality: Right;    TONSILLECTOMY      TOTAL REDUCTION MAMMOPLASTY      TUBAL LIGATION       Family History   Problem Relation Age of Onset    Breast cancer Mother 77    Hyperlipidemia Mother     Hypertension Mother     Lung cancer Father     Hyperlipidemia Father     Diabetes Maternal Uncle     Lung cancer Paternal Uncle     Stroke Maternal Grandmother     Diabetes Brother     Hyperlipidemia Brother     Cancer Brother         lymphoma     Vitals:    12/08/23 0851   Weight: 81.6 kg (179 lb 14.3 oz)   Height: 4' 11" (1.499 m)       Review of Systems   Constitutional:  Negative for chills, diaphoresis, fatigue, fever and unexpected weight change.   HENT:  Negative for trouble swallowing.    Eyes:  Negative for visual disturbance.   Respiratory:  Negative for shortness of breath.    Cardiovascular:  Negative for chest pain.   Gastrointestinal:  Negative for abdominal pain, constipation, nausea and vomiting.   Genitourinary:  Negative for difficulty urinating.   Musculoskeletal:  Negative for arthralgias, back pain, gait problem, joint swelling, myalgias, neck pain and neck stiffness.   Neurological:  Negative for dizziness, speech difficulty, weakness, light-headedness, numbness and headaches.          Objective:      Physical Exam  Neurological:      Mental Status: She is alert and oriented to person, place, and time.      Comments: She is awake and in no acute distress  No point tenderness or palpable masses about the lumbar spine  Forward flexion and extension of the lumbar spine are normal and painless  She can heel and toe walk normally  Reflexes- +1-+2 reflexes at the " following:   C5-Biceps   C6-Brachioradialis   C7-Triceps   L3/4-Patellar   S1-Achilles  Mina sign is negative bilaterally  Strength testing- 5/5 strength in the following muscle groups:  C5-Elbow flexion  C6-Wrist extension  C7-Elbow extension  C8-Finger flexion  T1-Finger abduction  L2-Hip flexion  L3-Knee extension  L4-Ankle dorsiflexion  L5-Great toe extension  S1-Ankle plantar flexion    Straight leg raise negative bilaterally                     Assessment:       1. Chronic bilateral low back pain with left-sided sciatica    2. Sciatica, left side           Plan:     She has a nonfocal neurological examination and no historical red flags.  I suspect she has chronic low back pain on basis of degenerative disc disease.  She has symptoms of intermittent left S1 radiculitis with no evidence of nerve root dysfunction.  She is not profoundly symptomatic.  I think she can be treated conservatively.  We are going to get some baseline x-rays on her and start her in formal physical therapy.  Consider MRI in preparation for epidural steroid injections.  Follow-up with me after therapy      Chronic bilateral low back pain with left-sided sciatica  -     X-Ray Lumbar Complete Including Flex And Ext; Future; Expected date: 12/08/2023  -     Ambulatory referral/consult to Physical/Occupational Therapy; Future; Expected date: 12/15/2023    Sciatica, left side  -     Ambulatory referral/consult to [facility]  -     Ambulatory referral/consult to [facility]

## 2024-02-02 ENCOUNTER — CLINICAL SUPPORT (OUTPATIENT)
Dept: REHABILITATION | Facility: HOSPITAL | Age: 74
End: 2024-02-02
Payer: MEDICARE

## 2024-02-02 DIAGNOSIS — M53.86 DECREASED RANGE OF MOTION OF LUMBAR SPINE: ICD-10-CM

## 2024-02-02 DIAGNOSIS — R29.3 POOR POSTURE: ICD-10-CM

## 2024-02-02 DIAGNOSIS — R29.898 DECREASED STRENGTH OF LOWER EXTREMITY: Primary | ICD-10-CM

## 2024-02-02 PROCEDURE — 97530 THERAPEUTIC ACTIVITIES: CPT | Mod: PN

## 2024-02-02 PROCEDURE — 97163 PT EVAL HIGH COMPLEX 45 MIN: CPT | Mod: PN

## 2024-02-02 NOTE — PLAN OF CARE
[facility]   Physical Therapy Initial Evaluation      Name: Aimee Palumbo  Clinic Number: [id number]    Therapy Diagnosis:   Encounter Diagnoses   Name Primary?    Decreased strength of lower extremity Yes    Poor posture     Decreased range of motion of lumbar spine         Physician: Andrés Hunt MD    Physician Orders: PT Eval and Treat  Medical Diagnosis from Referral:   Diagnosis   M54.42,G89.29 (ICD-10-CM) - Chronic bilateral low back pain with left-sided sciatica     Evaluation Date: 2/2/2024  Authorization Period Expiration: 12/7/2024  Plan of Care Expiration: 5/2/2024  Progress Note Due: 3/2/2024  Date of Surgery: none  Visit # / Visits authorized: 1/1   FOTO: 1/ 3    Precautions: Standard     Time In: 835 AM  Time Out: 935 AM  Total Billable Time: 60 minutes    Subjective     Date of onset: started when she had an old injury in labor not sure if this was from this; she has also fallen in the past maybe from this    History of current condition - Aimee reports:  in September she got very sick and she had a lot of pain in the lower L leg and BP shot up. Went to the doctor and the BP medicine was changed in addition to her other one. Reports heart and vascular doctor said she has a vein problem in the L lower leg. She went to the orthopedic doctor. The spine doctor said it was the sciatic nerve. Went to the chiropractor. The vitamin B given by chiropractor which really helped with her leg symptoms. She is feeling good and wasn't sure about coming to therapy. She has been using traction at the chiropractor. She is seeing him once a month now. Denies numbness and tingling. She does ankle pumps and hip stretches in bed. She is without pain upon arrival.     Falls: 0    Imaging: x ray:    Lumbar spine is in satisfactory alignment. The vertebral bodies are of normal height. There is mild disc space narrowing at L5-S1. The remainder of the intervertebral disc spaces are  maintained.  There is no subluxation with flexion or extension. There are no pars defects. The paraspinous soft tissues are normal. The SI joints are symmetrical. There are surgical clips in the right upper quadrant.  There is hardware within the right femoral neck. There is diffuse vascular calcification of the aorta.     IMPRESSION: Mild disc space narrowing at L5-S1     No acute osseous abnormality     No subluxation with flexion or extension    Prior Therapy: yes for R femur fracture   Social History: mobile home , 4 [address], lives alone  Occupation: just retired   Hobbies: gabrielle, plays cards, go to the [email or web address]  Prior Level of Function: independent   Current Level of Function: pain is she over does it, if she turns in bed, sometimes pain with walking and standing, increased pain with prolong sitting - starts in the hip and then travel to the leg after a while, pain with sitting on the couch     Pain:  Current 0/10, worst 3/10, best 0/10   Location: left back , buttocks , and lower leg lateral thigh  Description: Aching, Throbbing, Grabbing, Sharp, and Shooting  Aggravating Factors: Sitting, bending   Easing Factors:  chiropractor  , vitamin B , intermittent tylenol/ibuprofen     Patients goals: to be strong enough to go on trip to [place]      Medical History:   Past Medical History:   Diagnosis Date    Anxiety     Constipation     Coronary artery disease     Esophageal stricture     GERD (gastroesophageal reflux disease)     indefinite PPI    Hyperlipidemia     Hypertension     Mitral valve prolapse     ARLETH on CPAP     Osteoporosis        Surgical History:   Aimee Palumbo  has a past surgical history that includes Appendectomy; Esophagogastroduodenoscopy; cardiovascular stress testing (09/19/2017); echocardiography (09/19/2017); Tonsillectomy; Tubal ligation; Breast surgery (12/2010); Cholecystectomy (12/13/2017); Coronary stent placement (2007); Breast biopsy; Breast cyst aspiration; Total Reduction  Mammoplasty; and Intramedullary rodding of femur (Right, 6/5/2022).    Medications:   Aimee has a current medication list which includes the following prescription(s): aspirin, calcium-vitamin d3, ergocalciferol, ezetimibe, metoprolol succinate, multivitamin, nitroglycerin, pantoprazole, and rosuvastatin.    Allergies:   Review of patient's allergies indicates:  No Known Allergies     Objective      Structural/Postural Inspection: seated: rounded shoulders, forward head posture     Active Range of Motion (AROM)/Resisted Movements:     Lumbar/Thoracic AROM (Degrees) Pain/Dysfunctional Movement    Flexion WFL No pain    Extension Moderate limitation  No pain    Right side glide  Minimal limitation  No pain    Left side glide  Moderate limitation  Pain    Right Rotation Minimal limitation   No pain    Left Rotation Minimal limitation  Painn       Baselines:     Pain with L SG and L rotation     Repeated Movements:     Repeated Movements/Direction Specific Description  (Before, during, and after)   Postural Correction     Standing Extension (EIS) No worse, better with assessment of baselines 2 x 10 repetitions    Standing Flexion (FIS) NT   Supine Flexion (ETTA) 10 repetitions, worse, peripheralization with increasing repetitions, no change in baselines    Prone Extension (EIL) 10 repetitions, worse , peripheralization with increasing repetitions, worse with baselines      Flexion in rotation position: decrease better ( possible lateral component associated)    Static positions  Description  (Before, during, and after)   Prone lie  No pain    Prone on elbows Initial onset of pain but improvement with time, no change in baselines     Prone lie with hips off center, worse , peripheralization    Sitting with lumbar roll    Sitting slouched  No worse      LE MMT Testing:     RLE Strength Grade LLE Strength Grade   Hip Flexion (L2) 5/5 Hip Flexion (L2) 5/5   Hip Extension  NT Hip Extension NT   Hip Abduction NT Hip  Abduction NT   Hip Adduction NT Hip Adduction NT   Hip Internal Rotation NT Hip Internal Rotation NT   Hip External Rotation NT Hip External Rotation NT   Knee Flexion  NT Knee Flexion NT   Knee Extension (L3) 5/5 Knee Extension (L3) 5/5   Ankle Dorsiflexion (L4) 4-/5 Ankle Dorsiflexion (L4) 4/5   Great toe extension (L5) NT Great toe extension (L5) NT   Ankle Plantarflexion (S1) NT Ankle Plantarflexion (S1) NT     Hip Screen:      ROM: moderate limitation L hip IR , no pain with passive hip IR/ER    Sensation: NT neg for numbness and tingling     Neural Tension Testing Right  Left Comments   Seated Slump NT NT    SLR (Sciatic) Negative. Negative.      Outcome Measures:     Intake Outcome Measure for FOTO 2/2/2024 Survey    Therapist reviewed FOTO scores for Aimee Palumbo on 2/2/2024.   FOTO report - see Media section or FOTO account episode details.    Intake Score: 62%         Treatment     Total Treatment time (time-based codes) separate from Evaluation: 8 minutes     Aimee received the treatments listed below:      therapeutic activities to improve functional performance for 8  minutes, including:    Educated on healthy back program, visit one sheet given, educated on repeated movements, and directional preference, HEP     Patient Education and Home Exercises     Education provided:   - HEP  - therapy goals and POC   - prognosis     Written Home Exercises Provided: yes. Exercises were reviewed and Aimee was able to demonstrate them prior to the end of the session.  Aimee demonstrated good  understanding of the education provided. See EMR under Patient Instructions for exercises provided during therapy sessions.    Assessment     Aimee is a 73 y.o. female referred to outpatient Physical Therapy with a medical diagnosis of   M54.42,G89.29 (ICD-10-CM) - Chronic bilateral low back pain with left-sided sciatica     Patient presents with c/o chronic L sided low back pain with L sided radicular symptoms  which travel below the knee. She reports worse with bending and sitting. She has been seeing chiropractor and started taking a few supplements with great improvement in pain with hesitation to even come to therapy today due to improvements. She arrives with minimal to no L leg pain today. Standing lumbar assessment demonstrates decreased lumbar extension range of motion and pain associated with L SG and L rotation. Repeated movement assessment performed as well as static positions. She didn't respond as well to prone extension as I would have expected based on subjective reports so attempted repeated flexion but she also didn't respond well to that as both lead to increased peripheralization and worse to no effect on baselines. She responded best to repeated standing extensions at edge of mat. Based on signs and symptoms and subjective reports, she demonstrates 1 PEP pain pattern with extension preference at this time. There may also be a lateral component involved based on good response to flexion in rotation. She would benefit from transition into the  program to address chronic back and leg pain to improve functional mobility and engagement in active lifestyle.     Patient prognosis is Fair.   Patient will benefit from skilled outpatient Physical Therapy to address the deficits stated above and in the chart below, provide patient /family education, and to maximize patientt's level of independence.     Plan of care discussed with patient: Yes  Patient's spiritual, cultural and educational needs considered and patient is agreeable to the plan of care and goals as stated below:     Anticipated Barriers for therapy: co morbidities     Medical Necessity is demonstrated by the following  History  Co-morbidities and personal factors that may impact the plan of care [] LOW: no personal factors / co-morbidities  [] MODERATE: 1-2 personal factors / co-morbidities  [x] HIGH: 3+ personal factors /  co-morbidities    Moderate / High Support Documentation:   Co-morbidities affecting plan of care: HTN, depression, anxiety, osteoporosis     Personal Factors:   no deficits     Examination  Body Structures and Functions, activity limitations and participation restrictions that may impact the plan of care [] LOW: addressing 1-2 elements  [] MODERATE: 3+ elements  [x] HIGH: 4+ elements (please support below)    Moderate / High Support Documentation: range of motion, strength, posture, symmetry      Clinical Presentation [] LOW: stable  [] MODERATE: Evolving  [x] HIGH: Unstable     Decision Making/ Complexity Score: high       GOALS: Pt is in agreement with the following goals.    Short term goals:  6 weeks or 10 visits   - Pt will demonstrate increased lumbar MedX ROM by at least TBD degrees from the initial ROM value with improvements noted in functional ROM and ability to perform ADLs. Appropriate and Ongoing  - Pt will demonstrate increased MedX average isometric strength value by TBD% from initial test resulting in improved ability to perform bending, lifting, and carrying activities safely, confidently. Appropriate and Ongoing  - Pt will report a reduction in worst pain score by 1-2 points for improved tolerance for bending and sitting. Appropriate and Ongoing  - Pt able to perform HEP correctly with minimal cueing or supervision from therapist to encourage independent management of symptoms. Appropriate and Ongoing    Long term goals: 10 weeks or 20 visits   - Pt will demonstrate increased lumbar MedX ROM by at least TBD degrees from initial ROM value, resulting in improved ability to perform functional forward bending while standing and sitting. Appropriate and Ongoing  - Pt will demonstrate increased MedX average isometric strength value by TBD% from initial test resulting in improved ability to perform bending, lifting, and carrying activities safely and confidently. Appropriate and Ongoing  - Pt to  demonstrate ability to independently control and reduce their pain through posture positioning and mechanical movements throughout a typical day. Appropriate and Ongoing  - Pt will demonstrate reduced pain and improved functional outcomes as reported on the FOTO by reaching an intake score of >/= 70% functional ability in order to demonstrate subjective improvement in patient's condition. . Appropriate and Ongoing  - Pt will demonstrate independence with the HEP at discharge. Appropriate and Ongoing  - Patient will report >/= 50% improvement since evaluation to improve functional mobility and quality of life Appropriate and Ongoing    Plan     Plan of care Certification: 2/2/2024 to 5/2/2024.    Outpatient Physical Therapy 2 times weekly for 10 weeks to include the following interventions: Gait Training, Manual Therapy, Moist Heat/ Ice, Neuromuscular Re-ed, Patient Education, Therapeutic Activities, and Therapeutic Exercise.     La hWaley PT        Physician's Signature: _________________________________________ Date: ________________

## 2024-02-05 ENCOUNTER — CLINICAL SUPPORT (OUTPATIENT)
Dept: REHABILITATION | Facility: HOSPITAL | Age: 74
End: 2024-02-05
Payer: MEDICARE

## 2024-02-05 DIAGNOSIS — R29.3 POOR POSTURE: ICD-10-CM

## 2024-02-05 DIAGNOSIS — M53.86 DECREASED RANGE OF MOTION OF LUMBAR SPINE: ICD-10-CM

## 2024-02-05 DIAGNOSIS — R29.898 DECREASED STRENGTH OF LOWER EXTREMITY: Primary | ICD-10-CM

## 2024-02-05 PROCEDURE — 97112 NEUROMUSCULAR REEDUCATION: CPT | Mod: PN

## 2024-02-05 PROCEDURE — 97530 THERAPEUTIC ACTIVITIES: CPT | Mod: PN

## 2024-02-05 NOTE — PROGRESS NOTES
[facility]      Name: Aimee Palumbo  Clinic Number: [id number]    Therapy Diagnosis:   Encounter Diagnoses   Name Primary?    Decreased strength of lower extremity Yes    Poor posture     Decreased range of motion of lumbar spine      Physician: Andrés Hunt MD    Visit Date: 2/5/2024    Physician Orders: PT Eval and Treat  Medical Diagnosis from Referral:   Diagnosis   M54.42,G89.29 (ICD-10-CM) - Chronic bilateral low back pain with left-sided sciatica      Evaluation Date: 2/2/2024  Authorization Period Expiration: 12/31/2024  Plan of Care Expiration: 5/2/2024  Progress Note Due: 3/2/2024  Date of Surgery: none  Visit # / Visits authorized: 1/20  FOTO: 1/ 3    PTA Visit #: 0/5     Time In: 900 AM  Time Out: 945 AM  Total Billable Time: 30 minutes  INSURANCE and OUTCOMES: Fee for Service with FOTO Outcomes 1/3    Precautions: standard and HTN, osteoporosis     Pattern of pain determined: 1 PEP    Subjective   Aimee Palumbo reports non compliance with HEP.     Patient reports tolerating previous visit first tx session.   Patient reports their pain to be 3/10 on a 0-10 scale with 0 being no pain and 10 being the worst pain imaginable.  Pain Location: L sided low back into the LLE      Occupation: just retired   Hobbies: gabrielle, plays cards, go to the [employer]       Patients goals: to be strong enough to go on trip to [place]     Objective     Baseline IM Testing Results:   Date of testing: TBD    Outcomes:  Functional Score: 62%  Visit 6 Score:   Visit 10 Score:   Discharge Score:     Treatment    Aimee received the treatments listed below:        Physical Therapy technician assisted with treatment under direct supervision of treating therapist. Patient received 1:1 treatments for 30 minutes with Physical Therapist.     Aimee received neuromuscular education for 0 minutes via participation on the pocketfungames Machine. Therapist assisted patient in isolating and  engaging spinal stabilization musculature in order to improve functional ability and postural control. Patient performed exercise with therapist guidance in order to accurately use pacer function, avoid valsalva, and optimally exert effort within a safe and effective range via the Kaylee Exertion Rating Scale. Patient instructed to perform at a midrange of exertion and to complete 15-20 repetitions within appropriate split time, with proper technique, and while maintaining safety.     Aimee participated in neuromuscular re-education activities to improve balance, coordination, proprioception, motor control and/or posture for 10 minutes. The following activities were included:    Sidelying ER clams red theraband 20 repetitions each side  Open books tactile cues for proper form 10 repetitions each side      Aimee participated in therapeutic exercises to develop strength, endurance, ROM, flexibility, posture, and core stabilization for 18 minutes including:    Treadmill x 3 minutes 2.0 mph, gathering subjective   Torso, chest, row and hip ADD/ABD machines 2/5/2024:  Peripheral muscle strengthening which included one set of 15-20 repetitions at a slow and controlled 10-13 second per rep pace focused on strengthening supporting musculature in order to improve body mechanics and functional mobility.  Patient and therapist focused on proper form during treatment to ensure optimal strengthening of each targeted muscle group.  Machines utilized include torso rotation, chest press, triceps extension, bicep curl, and upright row. Leg extension, leg curl, leg press, and hip adduction/abduction added visit 3.    Aimee participated in dynamic functional therapeutic activities to improve functional performance and simulate household and community activities for 20  minutes. The following activities were included:    Standing assessment:    Flexion: WNL, no pain    Extension: moderate limitation , pain    L SG: pain L leg R SG:  pain L leg   L rotation: pain L leg, R rotation: no pain    Free standing extensions 10 repetitions , pain end range no worse  Standing extension over edge of mat 10 repetitions, pain end range, no effect   Standing extension over edge of mat 10 repetitions, pain end range, slightly better with standing L SG and L rotation   Prone press up 10 repetitions , no pain during performance, better with L SG and L rotation , no effect on bridges   Prone press up with sag 2 x 10 : no end range pain, abolishment of L rotation and L SG pain and better with bridges    Bridges 15 repetitions, pain during movement and end range   Prone press up no change    Prone press up sag better     manual therapy techniques: Joint mobilizations were applied to the lumbar spine for 3 minutes, including:    Central PA mobs grade 3-4.     Pt given cold pack for 0 minutes to 0 in 0.    Home Exercises Provided and Patient Education Provided    Home exercises include:   Prone press up sag or standing extensions  Cardio program (V5): -  Lifting education (V11): -  Posture/ Using Lumbar Roll: educated, purchased   Fridge Magnet Discharge handout (date given): -  Equipment at home/gym membership: none     Education provided:   - PT role and POC  - HEP    Written Home Exercises Provided: yes.  Exercises were reviewed and Aimee was able to demonstrate them prior to the end of the session. Aimee demonstrated good  understanding of the education provided.     See EMR under Patient Instructions for exercises provided 2/5/2024.    Assessment   Pt presents to second healthy back visit reporting no change and non compliance with HEP. She arrives minimal back and LLE pain. Standing assessment exposed L leg pain with L rotation and L SG as well as extension. Started with repeated movements in standing. She had pain end range in free standing and hinged over edge of mat. She reported slight improvement in standing assessment following standing extension  edge of mat. Attempted prone press ups today as these were painful during initial evaluation however she denied pain today with this. Performed these with greater improvement with standing assessment. Bridges were painful so ceased and performed press up with sag with improvement. She demonstrated improved range of motion and pain with standing L SG and L rotation post repeated movement; therefore, updated HEP to include prone press up with sag. Educated patient to cease if peripheralization starts. She verbalized good understanding.     Medx testing not performed today 2/2 history of osteoporosis. Physical Therapist has reached out to MD for approval of medx testing. Pt was also able to complete half of the peripheral strengthening exercises without increased discomfort and will complete the complete circuit next visit as tolerated.    Patient is making good progress towards established goals.  Pt will continue to benefit from skilled outpatient physical therapy to address the deficits stated in the impairment chart, provide pt/family education and to maximize pt's level of independence in the home and community environment.     Anticipated Barriers for therapy: co morbidities   Pt's spiritual, cultural and educational needs considered and pt agreeable to plan of care and goals as stated below:     Goals:    Short term goals:  6 weeks or 10 visits   - Pt will demonstrate increased lumbar MedX ROM by at least TBD degrees from the initial ROM value with improvements noted in functional ROM and ability to perform ADLs. Appropriate and Ongoing  - Pt will demonstrate increased MedX average isometric strength value by TBD% from initial test resulting in improved ability to perform bending, lifting, and carrying activities safely, confidently. Appropriate and Ongoing  - Pt will report a reduction in worst pain score by 1-2 points for improved tolerance for bending and sitting. Appropriate and Ongoing  - Pt able to perform  HEP correctly with minimal cueing or supervision from therapist to encourage independent management of symptoms. Appropriate and Ongoing     Long term goals: 10 weeks or 20 visits   - Pt will demonstrate increased lumbar MedX ROM by at least TBD degrees from initial ROM value, resulting in improved ability to perform functional forward bending while standing and sitting. Appropriate and Ongoing  - Pt will demonstrate increased MedX average isometric strength value by TBD% from initial test resulting in improved ability to perform bending, lifting, and carrying activities safely and confidently. Appropriate and Ongoing  - Pt to demonstrate ability to independently control and reduce their pain through posture positioning and mechanical movements throughout a typical day. Appropriate and Ongoing  - Pt will demonstrate reduced pain and improved functional outcomes as reported on the FOTO by reaching an intake score of >/= 70% functional ability in order to demonstrate subjective improvement in patient's condition. . Appropriate and Ongoing  - Pt will demonstrate independence with the HEP at discharge. Appropriate and Ongoing  - Patient will report >/= 50% improvement since evaluation to improve functional mobility and quality of life Appropriate and Ongoing    Plan   Continue with established Plan of Care towards established PT goals.     Therapist: La Whaley, PT  2/5/2024

## 2024-02-09 ENCOUNTER — CLINICAL SUPPORT (OUTPATIENT)
Dept: REHABILITATION | Facility: HOSPITAL | Age: 74
End: 2024-02-09
Payer: MEDICARE

## 2024-02-09 DIAGNOSIS — R29.898 DECREASED STRENGTH OF LOWER EXTREMITY: Primary | ICD-10-CM

## 2024-02-09 DIAGNOSIS — R29.3 POOR POSTURE: ICD-10-CM

## 2024-02-09 DIAGNOSIS — M53.86 DECREASED RANGE OF MOTION OF LUMBAR SPINE: ICD-10-CM

## 2024-02-09 PROCEDURE — 97112 NEUROMUSCULAR REEDUCATION: CPT | Mod: PN,CQ

## 2024-02-09 PROCEDURE — 97530 THERAPEUTIC ACTIVITIES: CPT | Mod: PN,CQ

## 2024-02-09 PROCEDURE — 97110 THERAPEUTIC EXERCISES: CPT | Mod: PN,CQ

## 2024-02-09 NOTE — PROGRESS NOTES
[facility]      Name: Aimee Palumbo  Clinic Number: [id number]    Therapy Diagnosis:   Encounter Diagnoses   Name Primary?    Decreased strength of lower extremity Yes    Poor posture     Decreased range of motion of lumbar spine        Physician: Andrés Hunt MD    Visit Date: 2/9/2024    Physician Orders: PT Eval and Treat  Medical Diagnosis from Referral:   Diagnosis   M54.42,G89.29 (ICD-10-CM) - Chronic bilateral low back pain with left-sided sciatica      Evaluation Date: 2/2/2024  Authorization Period Expiration: 12/31/2024  Plan of Care Expiration: 5/2/2024  Progress Note Due: 3/2/2024  Date of Surgery: none  Visit # / Visits authorized:  3/20  (healthy back 2/20)  FOTO: 1/ 3    PTA Visit #: 1/5     Time In: 1230  Time Out: 1315  Total Billable Time: 45 minutes  INSURANCE and OUTCOMES: Fee for Service with FOTO Outcomes 1/3    Precautions: standard and HTN, osteoporosis     Pattern of pain determined: 1 PEP    Subjective   Aimee Palumbo reports she did a lot of house cleaning this morning.  She woke up with no pain but she did vacuuming and mopping.  Pt stated when she reaches up she doesn't have pain like she did last week.    Patient reports tolerating previous visit first tx session.   Patient reports their pain to be  1/10 on a 0-10 scale with 0 being no pain and 10 being the worst pain imaginable.  Pain Location: L sided low back into the LLE      Occupation: just retired   Hobbies: gabrielle, plays cards, go to the [employer]       Patients goals: to be strong enough to go on trip to [place]     Objective     Baseline IM Testing Results:   Date of testing: TBD    Outcomes:  Functional Score: 62%  Visit 6 Score:   Visit 10 Score:   Discharge Score:     Treatment    Aimee received the treatments listed below:      Aimee received neuromuscular education for 0 minutes via participation on the [employer]-sensing Machine. Therapist assisted patient in isolating and  engaging spinal stabilization musculature in order to improve functional ability and postural control. Patient performed exercise with therapist guidance in order to accurately use pacer function, avoid valsalva, and optimally exert effort within a safe and effective range via the Kaylee Exertion Rating Scale. Patient instructed to perform at a midrange of exertion and to complete 15-20 repetitions within appropriate split time, with proper technique, and while maintaining safety.     Patient will be tested when approval received from doctorJennifer Yu participated in neuromuscular re-education activities to improve balance, coordination, proprioception, motor control and/or posture for 10 minutes. The following activities were included:    Sidelying external rotation clams red theraband x 20 repetitions each side  Open books tactile cues for proper form x 10 repetitions each side      Aimee participated in therapeutic exercises to develop strength, endurance, ROM, flexibility, posture, and core stabilization for 15 minutes including:    Treadmill x 4 minutes 2.3 mph, gathering subjective     Torso, chest, row and hip ADD/ABD machines 2/5/2024:  Peripheral muscle strengthening which included one set of 15-20 repetitions at a slow and controlled 10-13 second per rep pace focused on strengthening supporting musculature in order to improve body mechanics and functional mobility.  Patient and therapist focused on proper form during treatment to ensure optimal strengthening of each targeted muscle group.  Machines utilized include torso rotation, chest press, triceps extension, bicep curl, and upright row. Leg extension, leg curl, leg press, and hip adduction/abduction added visit 3.    Aimee participated in dynamic functional therapeutic activities to improve functional performance and simulate household and community activities for 20  minutes. The following activities were included:    Free standing extensions 10  "repetitions (not performed)  Standing extension over edge of mat 10 repetitions, (pain down her Left leg)  Standing extension over edge of mat 10 repetitions, (not performed)  Prone press up 10 repetitions, "not bad"  Prone press up with sag 2 x 10 reps   Prone press ups with over pressure x 10 reps ("felt good")    Bridges 10 repetitions, pain during movement and end range (adjusted feet out further an pain decreased some       manual therapy techniques: Joint mobilizations were applied to the lumbar spine for  minutes, including:    Central PA mobs grade 3-4.     Pt given cold pack for 0 minutes to 0 in 0.    Home Exercises Provided and Patient Education Provided    Home exercises include:   Prone press up sag or standing extensions  Cardio program (V5): -  Lifting education (V11): -  Posture/ Using Lumbar Roll: educated, purchased   Fridge Magnet Discharge handout (date given): -  Equipment at home/gym membership: none     Education provided:   - PT role and Plan of Care    - Home exercise program     Written Home Exercises Provided: yes.  Exercises were reviewed and Aimee was able to demonstrate them prior to the end of the session. Aimee demonstrated good  understanding of the education provided.     See EMR under Patient Instructions for exercises provided 2/5/2024.    Assessment   Pt presents to PT today with decreased pain in her back.  She is also reporting more movement and her pain decreased when she reaches up for something.  She had pain when doing standing extension over mat and exercises was stopped.  Discomfort also at first with prone press ups but stopped after a couple reps.  She also reported over pressure with press ups felt good.      Pt will be tested on MEdX by PT when approval received from doctor.      Patient is making good progress towards established goals.  Pt will continue to benefit from skilled outpatient physical therapy to address the deficits stated in the impairment chart, " provide pt/family education and to maximize pt's level of independence in the home and community environment.     Anticipated Barriers for therapy: co morbidities   Pt's spiritual, cultural and educational needs considered and pt agreeable to plan of care and goals as stated below:     Goals:    Short term goals:  6 weeks or 10 visits   - Pt will demonstrate increased lumbar MedX ROM by at least TBD degrees from the initial ROM value with improvements noted in functional ROM and ability to perform ADLs. Appropriate and Ongoing  - Pt will demonstrate increased MedX average isometric strength value by TBD% from initial test resulting in improved ability to perform bending, lifting, and carrying activities safely, confidently. Appropriate and Ongoing  - Pt will report a reduction in worst pain score by 1-2 points for improved tolerance for bending and sitting. Appropriate and Ongoing  - Pt able to perform HEP correctly with minimal cueing or supervision from therapist to encourage independent management of symptoms. Appropriate and Ongoing     Long term goals: 10 weeks or 20 visits   - Pt will demonstrate increased lumbar MedX ROM by at least TBD degrees from initial ROM value, resulting in improved ability to perform functional forward bending while standing and sitting. Appropriate and Ongoing  - Pt will demonstrate increased MedX average isometric strength value by TBD% from initial test resulting in improved ability to perform bending, lifting, and carrying activities safely and confidently. Appropriate and Ongoing  - Pt to demonstrate ability to independently control and reduce their pain through posture positioning and mechanical movements throughout a typical day. Appropriate and Ongoing  - Pt will demonstrate reduced pain and improved functional outcomes as reported on the FOTO by reaching an intake score of >/= 70% functional ability in order to demonstrate subjective improvement in patient's condition. .  Appropriate and Ongoing  - Pt will demonstrate independence with the HEP at discharge. Appropriate and Ongoing  - Patient will report >/= 50% improvement since evaluation to improve functional mobility and quality of life Appropriate and Ongoing    Plan   Continue with established Plan of Care towards established PT goals.     Wilma Ruiz, PTA  2/9/2024

## 2024-02-12 ENCOUNTER — CLINICAL SUPPORT (OUTPATIENT)
Dept: REHABILITATION | Facility: HOSPITAL | Age: 74
End: 2024-02-12
Payer: MEDICARE

## 2024-02-12 DIAGNOSIS — R29.3 POOR POSTURE: ICD-10-CM

## 2024-02-12 DIAGNOSIS — M53.86 DECREASED RANGE OF MOTION OF LUMBAR SPINE: ICD-10-CM

## 2024-02-12 DIAGNOSIS — R29.898 DECREASED STRENGTH OF LOWER EXTREMITY: Primary | ICD-10-CM

## 2024-02-12 PROCEDURE — 97112 NEUROMUSCULAR REEDUCATION: CPT | Mod: PN

## 2024-02-12 PROCEDURE — 97530 THERAPEUTIC ACTIVITIES: CPT | Mod: PN

## 2024-02-12 NOTE — PROGRESS NOTES
[facility]      Name: Aimee Palumbo  Clinic Number: [id number]    Therapy Diagnosis:   Encounter Diagnoses   Name Primary?    Decreased strength of lower extremity Yes    Poor posture     Decreased range of motion of lumbar spine      Physician: Andrés Hunt MD    Visit Date: 2/12/2024    Physician Orders: PT Eval and Treat  Medical Diagnosis from Referral:   Diagnosis   M54.42,G89.29 (ICD-10-CM) - Chronic bilateral low back pain with left-sided sciatica      Evaluation Date: 2/2/2024  Authorization Period Expiration: 12/31/2024  Plan of Care Expiration: 5/2/2024  Progress Note Due: 3/2/2024  Date of Surgery: none  Visit # / Visits authorized:  3 /20  (healthy back 4 /20)  FOTO: 1/ 3    PTA Visit #: 0/5     Time In: 1000 AM  Time Out: 1048 AM  Total Billable Time: 23 minutes  INSURANCE and OUTCOMES: Fee for Service with FOTO Outcomes 1/3    Precautions: standard and HTN, osteoporosis     Pattern of pain determined: 1 PEP    Subjective   Aimee Palumbo reports she is feeling a lot better. She can now reach overhead without pain. She plans to not go to the chiropractor while she is in therapy. O pain upon arrival.    Pt stated when she reaches up she doesn't have pain like she did last week.    Patient reports tolerating previous visit first tx session.   Patient reports their pain to be  0/10 on a 0-10 scale with 0 being no pain and 10 being the worst pain imaginable.  Pain Location: L sided low back into the LLE      Occupation: just retired   Hobbies: gabrielle, plays cards, go to the [employer]       Patients goals: to be strong enough to go on trip to [place]     Objective     Baseline IM Testing Results:   Date of testing: TBD    Outcomes:  Functional Score: 62%  Visit 6 Score:   Visit 10 Score:   Discharge Score:     Treatment    Aimee received the treatments listed below:        Physical Therapy technician assisted with treatment under direct supervision of treating  therapist. Patient received 1:1 treatments for 23 minutes with Physical Therapist.     Aimee received neuromuscular education for 0 minutes via participation on the [employer] Machine. Therapist assisted patient in isolating and engaging spinal stabilization musculature in order to improve functional ability and postural control. Patient performed exercise with therapist guidance in order to accurately use pacer function, avoid valsalva, and optimally exert effort within a safe and effective range via the Kaylee Exertion Rating Scale. Patient instructed to perform at a midrange of exertion and to complete 15-20 repetitions within appropriate split time, with proper technique, and while maintaining safety.     Patient will be tested when approval received from doctor.    Aimee participated in neuromuscular re-education activities to improve balance, coordination, proprioception, motor control and/or posture for 10 minutes. The following activities were included:    Sidelying external rotation clams green theraband x 20 repetitions each side  + bird/dog 2 x 5 repetitions each side     NP:   Open books tactile cues for proper form x 10 repetitions each side      Aimee participated in therapeutic exercises to develop strength, endurance, ROM, flexibility, posture, and core stabilization for 35 minutes including:    Treadmill x 4 minutes 2.3 mph, gathering subjective     Back extension machine precor 40 bs 3 x 10     Peripheral muscle strengthening which included one set of 15-20 repetitions at a slow and controlled 10-13 second per rep pace focused on strengthening supporting musculature in order to improve body mechanics and functional mobility.  Patient and therapist focused on proper form during treatment to ensure optimal strengthening of each targeted muscle group.  Machines utilized include torso rotation, chest press, triceps extension, bicep curl, and upright row. Leg extension, leg curl, leg press, and  hip adduction/abduction added visit 3.    Aimee participated in dynamic functional therapeutic activities to improve functional performance and simulate household and community activities for 10 minutes. The following activities were included:    Standing assessment:    Slight pain with rotation and SG    Prone press up with sag 2 x 10 repetitions, abolishment of pain    Bridges 20 repetitions, blue theraband hip abduction     manual therapy techniques: Joint mobilizations were applied to the lumbar spine for  minutes, including:    Central PA mobs grade 3-4.     Pt given cold pack for 0 minutes to 0 in 0.    Home Exercises Provided and Patient Education Provided    Home exercises include:   Prone press up sag or standing extensions  Cardio program (V5): -  Lifting education (V11): -  Posture/ Using Lumbar Roll: educated, purchased   Fridge Magnet Discharge handout (date given): -  Equipment at home/gym membership: none     Education provided:   - PT role and Plan of Care    - Home exercise program     Written Home Exercises Provided: yes.  Exercises were reviewed and Aimee was able to demonstrate them prior to the end of the session. Aimee demonstrated good  understanding of the education provided.     See EMR under Patient Instructions for exercises provided 2/5/2024.    Assessment     Arrives with reduction in back pain since evaluation. She is somewhat compliant with her exercises at home. She reports relief with press ups. Medx not started today 2/2 waiting on approval from MD, but added back extension machine today as a replacement for now. She tolerated this well. She is progressing well towards goals.     Patient is making good progress towards established goals.  Pt will continue to benefit from skilled outpatient physical therapy to address the deficits stated in the impairment chart, provide pt/family education and to maximize pt's level of independence in the home and community environment.      Anticipated Barriers for therapy: co morbidities   Pt's spiritual, cultural and educational needs considered and pt agreeable to plan of care and goals as stated below:     Goals:    Short term goals:  6 weeks or 10 visits   - Pt will demonstrate increased lumbar MedX ROM by at least TBD degrees from the initial ROM value with improvements noted in functional ROM and ability to perform ADLs. Appropriate and Ongoing  - Pt will demonstrate increased MedX average isometric strength value by TBD% from initial test resulting in improved ability to perform bending, lifting, and carrying activities safely, confidently. Appropriate and Ongoing  - Pt will report a reduction in worst pain score by 1-2 points for improved tolerance for bending and sitting. Appropriate and Ongoing  - Pt able to perform HEP correctly with minimal cueing or supervision from therapist to encourage independent management of symptoms. Appropriate and Ongoing     Long term goals: 10 weeks or 20 visits   - Pt will demonstrate increased lumbar MedX ROM by at least TBD degrees from initial ROM value, resulting in improved ability to perform functional forward bending while standing and sitting. Appropriate and Ongoing  - Pt will demonstrate increased MedX average isometric strength value by TBD% from initial test resulting in improved ability to perform bending, lifting, and carrying activities safely and confidently. Appropriate and Ongoing  - Pt to demonstrate ability to independently control and reduce their pain through posture positioning and mechanical movements throughout a typical day. Appropriate and Ongoing  - Pt will demonstrate reduced pain and improved functional outcomes as reported on the FOTO by reaching an intake score of >/= 70% functional ability in order to demonstrate subjective improvement in patient's condition. . Appropriate and Ongoing  - Pt will demonstrate independence with the HEP at discharge. Appropriate and Ongoing  -  Patient will report >/= 50% improvement since evaluation to improve functional mobility and quality of life Appropriate and Ongoing    Plan   Continue with established Plan of Care towards established PT goals.     La Whaley, PT  2/12/2024

## 2024-02-16 ENCOUNTER — CLINICAL SUPPORT (OUTPATIENT)
Dept: REHABILITATION | Facility: HOSPITAL | Age: 74
End: 2024-02-16
Payer: MEDICARE

## 2024-02-16 DIAGNOSIS — M53.86 DECREASED RANGE OF MOTION OF LUMBAR SPINE: ICD-10-CM

## 2024-02-16 DIAGNOSIS — R29.3 POOR POSTURE: ICD-10-CM

## 2024-02-16 DIAGNOSIS — R29.898 DECREASED STRENGTH OF LOWER EXTREMITY: Primary | ICD-10-CM

## 2024-02-16 PROCEDURE — 97110 THERAPEUTIC EXERCISES: CPT | Mod: PN,CQ

## 2024-02-16 PROCEDURE — 97112 NEUROMUSCULAR REEDUCATION: CPT | Mod: PN,CQ

## 2024-02-16 PROCEDURE — 97530 THERAPEUTIC ACTIVITIES: CPT | Mod: PN,CQ

## 2024-02-16 NOTE — PROGRESS NOTES
[facility]      Name: Aimee Palumbo  Clinic Number: [id number]    Therapy Diagnosis:   Encounter Diagnoses   Name Primary?    Decreased strength of lower extremity Yes    Poor posture     Decreased range of motion of lumbar spine        Physician: Andrés Hunt MD    Visit Date: 2/16/2024    Physician Orders: PT Eval and Treat  Medical Diagnosis from Referral:   Diagnosis   M54.42,G89.29 (ICD-10-CM) - Chronic bilateral low back pain with left-sided sciatica      Evaluation Date: 2/2/2024  Authorization Period Expiration: 12/31/2024  Plan of Care Expiration: 5/2/2024  Progress Note Due: 3/2/2024  Date of Surgery: none  Visit # / Visits authorized: 5 /20  (healthy back  /20)  FOTO: 1/ 3    PTA Visit #: 1/5     Time In: 1000  Time Out: 1049  Total Billable Time: 49 minutes  INSURANCE and OUTCOMES: Fee for Service with FOTO Outcomes 1/3    Precautions: standard and HTN, osteoporosis     Pattern of pain determined: 1 PEP    Subjective   Aimee Palumbo reports she is doing good and not having pain.      Pt stated when she reaches up she doesn't have pain like she did last week.    Patient reports tolerating previous visit first tx session.   Patient reports their pain to be  0/10 on a 0-10 scale with 0 being no pain and 10 being the worst pain imaginable.  Pain Location: L sided low back into the LLE      Occupation: just retired   Hobbies: gabrielle, plays cards, go to the [employer]       Patients goals: to be strong enough to go on trip to [place]     Objective     Baseline IM Testing Results:   Date of testing: TBD    Outcomes:  Functional Score: 62%  Visit 6 Score:   Visit 10 Score:   Discharge Score:     Treatment    Aimee received the treatments listed below:        Aimee received neuromuscular education for 0 minutes via participation on the [employer] Machine. Therapist assisted patient in isolating and engaging spinal stabilization musculature in order to improve  functional ability and postural control. Patient performed exercise with therapist guidance in order to accurately use pacer function, avoid valsalva, and optimally exert effort within a safe and effective range via the Kaylee Exertion Rating Scale. Patient instructed to perform at a midrange of exertion and to complete 15-20 repetitions within appropriate split time, with proper technique, and while maintaining safety.     Patient will be tested when approval received from doctorJennifer Yu participated in neuromuscular re-education activities to improve balance, coordination, proprioception, motor control and/or posture for 10 minutes. The following activities were included:    Sidelying external rotation clams green theraband x 20 repetitions each side  bird/dog 2 x 5 repetitions each side   Open books tactile cues for proper form x 20 repetitions each side      Aimee participated in therapeutic exercises to develop strength, endurance, ROM, flexibility, posture, and core stabilization for 19 minutes including:    Treadmill x 4 minutes 2.3 mph, gathering subjective     Back extension machine precor 40 bs 3 x 10     Peripheral muscle strengthening which included one set of 15-20 repetitions at a slow and controlled 10-13 second per rep pace focused on strengthening supporting musculature in order to improve body mechanics and functional mobility.  Patient and therapist focused on proper form during treatment to ensure optimal strengthening of each targeted muscle group.  Machines utilized include torso rotation, chest press, triceps extension, bicep curl, and upright row. Leg extension, leg curl, leg press, and hip adduction/abduction added visit 3.    Aimee participated in dynamic functional therapeutic activities to improve functional performance and simulate household and community activities for 20 minutes. The following activities were included:    Prone press ups x 10 reps   Prone press up with sag 2 x 10  repetitions, abolishment of pain    Prone press ups with over pressure x 10 reps   Prone hip extension 2 x 10 reps each   Bridges x 20 repetitions (pain less)  hip abduction Blue Theraband x 20 reps      manual therapy techniques: Joint mobilizations were applied to the lumbar spine for  minutes, including:    Central PA mobs grade 3-4.     Pt given cold pack for 0 minutes to 0 in 0.    Home Exercises Provided and Patient Education Provided    Home exercises include:   Prone press up sag or standing extensions  Cardio program (V5): -  Lifting education (V11): -  Posture/ Using Lumbar Roll: educated, purchased   Fridge Magnet Discharge handout (date given): -  Equipment at home/gym membership: none     Education provided:   - PT role and Plan of Care    - Home exercise program     Written Home Exercises Provided: yes.  Exercises were reviewed and Aimee was able to demonstrate them prior to the end of the session. Aimee demonstrated good  understanding of the education provided.     See EMR under Patient Instructions for exercises provided 2/5/2024.    Assessment     Patient presents to PT today with no pain.  She is progressing well with her exercises. Patient did have pain when she was doing bridging and hip Abduction at same time.  Exercises was broken up.  She did not have pain with hip Abduction but had some discomfort with bridges.      Patient is making good progress towards established goals.  Pt will continue to benefit from skilled outpatient physical therapy to address the deficits stated in the impairment chart, provide pt/family education and to maximize pt's level of independence in the home and community environment.     Anticipated Barriers for therapy: co morbidities   Pt's spiritual, cultural and educational needs considered and pt agreeable to plan of care and goals as stated below:     Goals:    Short term goals:  6 weeks or 10 visits   - Pt will demonstrate increased lumbar MedX ROM by at  least TBD degrees from the initial ROM value with improvements noted in functional ROM and ability to perform ADLs. Appropriate and Ongoing  - Pt will demonstrate increased MedX average isometric strength value by TBD% from initial test resulting in improved ability to perform bending, lifting, and carrying activities safely, confidently. Appropriate and Ongoing  - Pt will report a reduction in worst pain score by 1-2 points for improved tolerance for bending and sitting. Appropriate and Ongoing  - Pt able to perform HEP correctly with minimal cueing or supervision from therapist to encourage independent management of symptoms. Appropriate and Ongoing     Long term goals: 10 weeks or 20 visits   - Pt will demonstrate increased lumbar MedX ROM by at least TBD degrees from initial ROM value, resulting in improved ability to perform functional forward bending while standing and sitting. Appropriate and Ongoing  - Pt will demonstrate increased MedX average isometric strength value by TBD% from initial test resulting in improved ability to perform bending, lifting, and carrying activities safely and confidently. Appropriate and Ongoing  - Pt to demonstrate ability to independently control and reduce their pain through posture positioning and mechanical movements throughout a typical day. Appropriate and Ongoing  - Pt will demonstrate reduced pain and improved functional outcomes as reported on the FOTO by reaching an intake score of >/= 70% functional ability in order to demonstrate subjective improvement in patient's condition. . Appropriate and Ongoing  - Pt will demonstrate independence with the HEP at discharge. Appropriate and Ongoing  - Patient will report >/= 50% improvement since evaluation to improve functional mobility and quality of life Appropriate and Ongoing    Plan   Continue with established Plan of Care towards established PT goals.     Wilma Ruiz, PTA  2/16/2024

## 2024-02-19 ENCOUNTER — CLINICAL SUPPORT (OUTPATIENT)
Dept: REHABILITATION | Facility: HOSPITAL | Age: 74
End: 2024-02-19
Payer: MEDICARE

## 2024-02-19 DIAGNOSIS — R29.898 DECREASED STRENGTH OF LOWER EXTREMITY: Primary | ICD-10-CM

## 2024-02-19 DIAGNOSIS — R29.3 POOR POSTURE: ICD-10-CM

## 2024-02-19 DIAGNOSIS — M53.86 DECREASED RANGE OF MOTION OF LUMBAR SPINE: ICD-10-CM

## 2024-02-19 PROCEDURE — 97110 THERAPEUTIC EXERCISES: CPT | Mod: PN

## 2024-02-19 PROCEDURE — 97530 THERAPEUTIC ACTIVITIES: CPT | Mod: PN

## 2024-02-19 PROCEDURE — 97112 NEUROMUSCULAR REEDUCATION: CPT | Mod: PN

## 2024-02-19 NOTE — PROGRESS NOTES
[facility]      Name: Aimee Palumbo  Clinic Number: [id number]    Therapy Diagnosis:   Encounter Diagnoses   Name Primary?    Decreased strength of lower extremity Yes    Poor posture     Decreased range of motion of lumbar spine        Physician: Andrés Hunt MD    Visit Date: 2024    Physician Orders: PT Eval and Treat  Medical Diagnosis from Referral:   Diagnosis   M54.42,G89.29 (ICD-10-CM) - Chronic bilateral low back pain with left-sided sciatica      Evaluation Date: 2024  Authorization Period Expiration: 2024  Plan of Care Expiration: 2024  Progress Note Due: 3/2/2024  Date of Surgery: none  Visit # / Visits authorized:   (healthy back )  FOTO: 1/ 3    PTA Visit #: 0/5     Time In: 1235   Time Out: 1335  Total Billable Time: 60 minutes  INSURANCE and OUTCOMES: Fee for Service with FOTO Outcomes 1/3    Precautions: standard and HTN, osteoporosis     Pattern of pain determined: 1 PEP    Subjective   Aimee Palumbo reports she is doing good. No pain with standing reaches which she used to have. She can also move easier and without pain.     Pt stated when she reaches up she doesn't have pain like she did last week.    Patient reports tolerating previous visit first tx session.   Patient reports their pain to be  0/10 on a 0-10 scale with 0 being no pain and 10 being the worst pain imaginable.  Pain Location: L sided low back into the LLE      Occupation: just retired   Hobbies: gabrielle, plays cards, go to the [employer]       Patients goals: to be strong enough to go on trip to [place]     Objective     Baseline IM Testing Results:   Date of testin2024  ROM 0-60 deg   Max Peak Torque 129    Min Peak Torque 18    Flex/Ext Ratio 7   % below normative data 35%     Outcomes:  Functional Score: 62%  Visit 6 Score: 69/100   Visit 10 Score:   Discharge Score:     Treatment    Aimee received the treatments listed below:      Aimee  received neuromuscular education for 15 minutes via participation on the [employer] Machine. Therapist assisted patient in isolating and engaging spinal stabilization musculature in order to improve functional ability and postural control. Patient performed exercise with therapist guidance in order to accurately use pacer function, avoid valsalva, and optimally exert effort within a safe and effective range via the Kaylee Exertion Rating Scale. Patient instructed to perform at a midrange of exertion and to complete 15-20 repetitions within appropriate split time, with proper technique, and while maintaining safety.         2/19/2024     1:47 PM   [facility]   Visit Number 6   VAS Pain Rating 0   Treadmill Time (in min.) 3 min   Speed 2.3 mph   Lumbar Extension Seat Pad 2   Femur Restraint 6   Top Dead Center 24   Counterweight 107   Lumbar Flexion 60   Lumbar Extension 0   Lumbar Peak Torque 129 ft. lbs.   Min Torque 18   Test Percent Below Normative Data 35 %     Aimee participated in neuromuscular re-education activities to improve balance, coordination, proprioception, motor control and/or posture for 15 minutes. The following activities were included:    Bird/dog 2 x 5 repetitions each side   Open books tactile cues for proper form x 20 repetitions each side    + fire hydrants 2 x 10 each side     NP:   Sidelying external rotation clams green theraband x 20 repetitions each side    Aimee participated in therapeutic exercises to develop strength, endurance, ROM, flexibility, posture, and core stabilization for 10 minutes including:    Treadmill x 3 minutes 2.3 mph, gathering subjective   Peripheral muscle strengthening which included one set of 15-20 repetitions at a slow and controlled 10-13 second per rep pace focused on strengthening supporting musculature in order to improve body mechanics and functional mobility. Patient and therapist focused on proper form during treatment to ensure optimal  strengthening of each targeted muscle group.  Machines utilized include torso rotation, chest press, triceps extension, bicep curl, and upright row. Leg extension, leg curl, leg press, and hip adduction/abduction added visit 3.    Aimee participated in dynamic functional therapeutic activities to improve functional performance and simulate household and community activities for 20 minutes. The following activities were included:    Prone press ups x 10 repetitions with sag     Prone press up x 10 repetitions with therapist overpressure    Better with standing rotation, worse with standing extension     Bridges painful ceased into LLE 20 repetitions total  DKTC 10 repetitions , slightly worse to no effect    Prone press up 1 x 10, no effect     Prone press up with sag 1 x 10, decrease better     NP:  Prone hip extension 2 x 10 reps each   Bridges x 20 repetitions (pain less)  hip abduction Blue Theraband x 20 reps      manual therapy techniques: Joint mobilizations were applied to the lumbar spine for 3 minutes, including:    Central PA mobs grade 3-4.     Pt given cold pack for 0 minutes to 0 in 0.    Home Exercises Provided and Patient Education Provided    Home exercises include:   Prone press up sag or standing extensions  Cardio program (V5): -  Lifting education (V11): -  Posture/ Using Lumbar Roll: educated, purchased   Fridge Magnet Discharge handout (date given): -  Equipment at home/gym membership: none     Education provided:   - PT role and Plan of Care    - Home exercise program     Written Home Exercises Provided: yes.  Exercises were reviewed and Aimee was able to demonstrate them prior to the end of the session. Aimee demonstrated good  understanding of the education provided.     See EMR under Patient Instructions for exercises provided 2/5/2024.    Assessment     Arrives without pain. Started with prone press up with sag. Due to reports of improvement, but not abolishment of symptoms added  overpressure by therapist with no pain as well as mobilizations. Then performed supine bridges with reports of pain so ceased. Performed DKTC to assess change in symptoms but no effect to slightly worse so returned to prone press ups with improvement following press up with sag. This indicates possibly too much force applied with mobs and therapist overpressure with patient responding best to self overpressure. Medx testing also performed today as MD approved testing. Based on testing, she demonstrates WFL range of motion and impaired lumbar strength based on age matched norms.     Patient is making good progress towards established goals.  Pt will continue to benefit from skilled outpatient physical therapy to address the deficits stated in the impairment chart, provide pt/family education and to maximize pt's level of independence in the home and community environment.     Anticipated Barriers for therapy: co morbidities   Pt's spiritual, cultural and educational needs considered and pt agreeable to plan of care and goals as stated below:     Goals:    Short term goals:  6 weeks or 10 visits   - Pt will demonstrate increased lumbar MedX ROM by at least 3 degrees from the initial ROM value with improvements noted in functional ROM and ability to perform ADLs. Appropriate and Ongoing  - Pt will demonstrate increased MedX average isometric strength value by 15% from initial test resulting in improved ability to perform bending, lifting, and carrying activities safely, confidently. Appropriate and Ongoing  - Pt will report a reduction in worst pain score by 1-2 points for improved tolerance for bending and sitting. Appropriate and Ongoing  - Pt able to perform HEP correctly with minimal cueing or supervision from therapist to encourage independent management of symptoms. Appropriate and Ongoing     Long term goals: 10 weeks or 20 visits   - Pt will demonstrate increased lumbar MedX ROM by at least 6 degrees from  initial ROM value, resulting in improved ability to perform functional forward bending while standing and sitting. Appropriate and Ongoing  - Pt will demonstrate increased MedX average isometric strength value by 30% from initial test resulting in improved ability to perform bending, lifting, and carrying activities safely and confidently. Appropriate and Ongoing  - Pt to demonstrate ability to independently control and reduce their pain through posture positioning and mechanical movements throughout a typical day. Appropriate and Ongoing  - Pt will demonstrate reduced pain and improved functional outcomes as reported on the FOTO by reaching an intake score of >/= 70% functional ability in order to demonstrate subjective improvement in patient's condition. . Appropriate and Ongoing  - Pt will demonstrate independence with the HEP at discharge. Appropriate and Ongoing  - Patient will report >/= 50% improvement since evaluation to improve functional mobility and quality of life Appropriate and Ongoing    Plan   Continue with established Plan of Care towards established PT goals.     La Whaley, PT  2/19/2024

## 2024-02-23 ENCOUNTER — CLINICAL SUPPORT (OUTPATIENT)
Dept: REHABILITATION | Facility: HOSPITAL | Age: 74
End: 2024-02-23
Payer: MEDICARE

## 2024-02-23 DIAGNOSIS — M53.86 DECREASED RANGE OF MOTION OF LUMBAR SPINE: ICD-10-CM

## 2024-02-23 DIAGNOSIS — R29.3 POOR POSTURE: ICD-10-CM

## 2024-02-23 DIAGNOSIS — R29.898 DECREASED STRENGTH OF LOWER EXTREMITY: Primary | ICD-10-CM

## 2024-02-23 PROCEDURE — 97110 THERAPEUTIC EXERCISES: CPT | Mod: PN

## 2024-02-23 PROCEDURE — 97112 NEUROMUSCULAR REEDUCATION: CPT | Mod: PN

## 2024-02-23 PROCEDURE — 97530 THERAPEUTIC ACTIVITIES: CPT | Mod: PN

## 2024-02-23 NOTE — PROGRESS NOTES
[facility]      Name: Aimee Palumbo  Clinic Number: [id number]    Therapy Diagnosis:   Encounter Diagnoses   Name Primary?    Decreased strength of lower extremity Yes    Poor posture     Decreased range of motion of lumbar spine        Physician: Andrés Hunt MD    Visit Date: 2024    Physician Orders: PT Eval and Treat  Medical Diagnosis from Referral:   Diagnosis   M54.42,G89.29 (ICD-10-CM) - Chronic bilateral low back pain with left-sided sciatica      Evaluation Date: 2024  Authorization Period Expiration: 2024  Plan of Care Expiration: 2024  Progress Note Due: 3/2/2024  Date of Surgery: none  Visit # / Visits authorized:   (healthy back )  FOTO: 1/ 3    PTA Visit #: 0/5     Time In: 905 AM  Time Out: 1000 AM  Total Billable Time: 55 minutes  INSURANCE and OUTCOMES: Fee for Service with FOTO Outcomes 1/3    Precautions: standard and HTN, osteoporosis     Pattern of pain determined: 1 PEP    Subjective   Aimee Palumbo reports a pulling in the L leg when she tries to move it horizontally. This started about 10 days ago. This only occurs with stationary standing for a minute or two. No pain in the R leg with sitting. Reports she had some pain last night in the left leg when she was turning in the bed. Increased pain today which she contributes to wearing her boots. Morning pain has completely gone away.     Pt stated when she reaches up she doesn't have pain like she did last week.    Patient reports tolerating previous visit first tx session.   Patient reports their pain to be  0/10 on a 0-10 scale with 0 being no pain and 10 being the worst pain imaginable.  Pain Location: L sided low back into the LLE      Occupation: just retired   Hobbies: [employer], [employer], go to the [employer]       Patients goals: to be strong enough to go on trip to [place]     Objective     Baseline IM Testing Results:   Date of testin2024  ROM 0-60 deg    Max Peak Torque 129    Min Peak Torque 18    Flex/Ext Ratio 7   % below normative data 35%     Outcomes:  Functional Score: 62%  Visit 6 Score: 69/100   Visit 10 Score:   Discharge Score:     Treatment    Aimee received the treatments listed below:      Aimee received neuromuscular education for 8 minutes via participation on the [employer] Machine. Therapist assisted patient in isolating and engaging spinal stabilization musculature in order to improve functional ability and postural control. Patient performed exercise with therapist guidance in order to accurately use pacer function, avoid valsalva, and optimally exert effort within a safe and effective range via the Kaylee Exertion Rating Scale. Patient instructed to perform at a midrange of exertion and to complete 15-20 repetitions within appropriate split time, with proper technique, and while maintaining safety.         2/23/2024     9:52 AM   [facility]   Visit Number 7   VAS Pain Rating 3   Treadmill Time (in min.) 3 min   Speed 2.3 mph   Lumbar Weight 60 lbs   Repetitions 20   Rating of Perceived Exertion 4     Aimee participated in neuromuscular re-education activities to improve balance, coordination, proprioception, motor control and/or posture for 3 minutes. The following activities were included:    Sidelying ER clams red theraband 20 repetitions     NP:     Bird/dog 2 x 5 repetitions each side   Open books tactile cues for proper form x 20 repetitions each side    + fire hydrants 2 x 10 each side   Sidelying external rotation clams green theraband x 20 repetitions each side    Aimee participated in therapeutic exercises to develop strength, endurance, ROM, flexibility, posture, and core stabilization for 19 minutes including:    Treadmill x 3 minutes 2.3 mph, gathering subjective   Peripheral muscle strengthening which included one set of 15-20 repetitions at a slow and controlled 10-13 second per rep pace focused on strengthening  supporting musculature in order to improve body mechanics and functional mobility. Patient and therapist focused on proper form during treatment to ensure optimal strengthening of each targeted muscle group.  Machines utilized include torso rotation, chest press, triceps extension, bicep curl, and upright row. Leg extension, leg curl, leg press, and hip adduction/abduction added visit 3.    Aimee participated in dynamic functional therapeutic activities to improve functional performance and simulate household and community activities for 25 minutes. The following activities were included:    Standing baselines:    Pain with end range extension, pain with L SG in LLE    Prone press up sag, decrease leg pain with prone extensions but worse with standing extension and L SG baselines     Prone lie, no pain, no effect     Prone press up, L sided low back pain ( centralization no leg pain), slightly better with standing extension , no effect on L SG, worse with supine bridges     DKTC 2 x 10 , decrease, better with standing baselines, better with bridges but quickly returns     Seated flexion 2 x 10, decrease, better with standing baselines and bridges     NP:  Prone hip extension 2 x 10 reps each   Bridges x 20 repetitions (pain less)  hip abduction Blue Theraband x 20 reps      manual therapy techniques: Joint mobilizations were applied to the lumbar spine for 0 minutes, including:    Central PA mobs grade 3-4.     Pt given cold pack for 0 minutes to 0 in 0.    Home Exercises Provided and Patient Education Provided    Home exercises include:   Prone press up sag or standing extensions  Cardio program (V5): -  Lifting education (V11): -  Posture/ Using Lumbar Roll: educated, purchased   Frie Magnet Discharge handout (date given): -  Equipment at home/gym membership: none     Education provided:   - PT role and Plan of Care    - Home exercise program     Written Home Exercises Provided: yes.  Exercises were reviewed  and Aimee was able to demonstrate them prior to the end of the session. Aimee demonstrated good  understanding of the education provided.     See EMR under Patient Instructions for exercises provided 2/5/2024.    Assessment     Arrives with slight increased pain today which she contributes to wearing her boots with slight heel yesterday. Standing assessment demonstrated pain with standing extension and L SG. Started with HEP prone press ups with no effect. Added sag with increased stiffness into extension and worse with peripheralization with L SG. Returned to prone and removed sag with slight improvement but minimal. Supine bridges continue to be painful with no effect on these following repeated prone extensions. Therefore attempted flexion again. She responded fairly well to DKTC without pain during movement and improvement in standing extension range of motion and L SG symptoms. Attempted force progression to seated position with continued improvement in pain. Bridging was less painful following repeated flexion as well as moving on the mat was also less painful. Pt was sent home with seated repeated flexion to assess possible different directional preference.     Patient is making good progress towards established goals.  Pt will continue to benefit from skilled outpatient physical therapy to address the deficits stated in the impairment chart, provide pt/family education and to maximize pt's level of independence in the home and community environment.     Anticipated Barriers for therapy: co morbidities   Pt's spiritual, cultural and educational needs considered and pt agreeable to plan of care and goals as stated below:     Goals:    Short term goals:  6 weeks or 10 visits   - Pt will demonstrate increased lumbar MedX ROM by at least 3 degrees from the initial ROM value with improvements noted in functional ROM and ability to perform ADLs. Appropriate and Ongoing  - Pt will demonstrate increased MedX  average isometric strength value by 15% from initial test resulting in improved ability to perform bending, lifting, and carrying activities safely, confidently. Appropriate and Ongoing  - Pt will report a reduction in worst pain score by 1-2 points for improved tolerance for bending and sitting. Appropriate and Ongoing  - Pt able to perform HEP correctly with minimal cueing or supervision from therapist to encourage independent management of symptoms. Appropriate and Ongoing     Long term goals: 10 weeks or 20 visits   - Pt will demonstrate increased lumbar MedX ROM by at least 6 degrees from initial ROM value, resulting in improved ability to perform functional forward bending while standing and sitting. Appropriate and Ongoing  - Pt will demonstrate increased MedX average isometric strength value by 30% from initial test resulting in improved ability to perform bending, lifting, and carrying activities safely and confidently. Appropriate and Ongoing  - Pt to demonstrate ability to independently control and reduce their pain through posture positioning and mechanical movements throughout a typical day. Appropriate and Ongoing  - Pt will demonstrate reduced pain and improved functional outcomes as reported on the FOTO by reaching an intake score of >/= 70% functional ability in order to demonstrate subjective improvement in patient's condition. . Appropriate and Ongoing  - Pt will demonstrate independence with the HEP at discharge. Appropriate and Ongoing  - Patient will report >/= 50% improvement since evaluation to improve functional mobility and quality of life Appropriate and Ongoing    Plan   Continue with established Plan of Care towards established PT goals.     La Whaley, PT  2/23/2024

## 2024-02-26 ENCOUNTER — CLINICAL SUPPORT (OUTPATIENT)
Dept: REHABILITATION | Facility: HOSPITAL | Age: 74
End: 2024-02-26
Payer: MEDICARE

## 2024-02-26 DIAGNOSIS — R29.898 DECREASED STRENGTH OF LOWER EXTREMITY: Primary | ICD-10-CM

## 2024-02-26 DIAGNOSIS — M53.86 DECREASED RANGE OF MOTION OF LUMBAR SPINE: ICD-10-CM

## 2024-02-26 DIAGNOSIS — R29.3 POOR POSTURE: ICD-10-CM

## 2024-02-26 PROCEDURE — 97530 THERAPEUTIC ACTIVITIES: CPT | Mod: PN

## 2024-02-26 PROCEDURE — 97112 NEUROMUSCULAR REEDUCATION: CPT | Mod: PN

## 2024-02-26 NOTE — PROGRESS NOTES
[facility]      Name: Aimee Palumbo  Clinic Number: [id number]    Therapy Diagnosis:   Encounter Diagnoses   Name Primary?    Decreased strength of lower extremity Yes    Poor posture     Decreased range of motion of lumbar spine        Physician: Andrés Hunt MD    Visit Date: 2024    Physician Orders: PT Eval and Treat  Medical Diagnosis from Referral:   Diagnosis   M54.42,G89.29 (ICD-10-CM) - Chronic bilateral low back pain with left-sided sciatica      Evaluation Date: 2024  Authorization Period Expiration: 2024  Plan of Care Expiration: 2024  Progress Note Due: 3/2/2024  Date of Surgery: none  Visit # / Visits authorized:   (healthy back )  FOTO: 1/ 3    PTA Visit #: 0/5     Time In: 903 AM  Time Out: 1000 AM  Total Billable Time: 38 minutes 1:1 time   INSURANCE and OUTCOMES: Fee for Service with FOTO Outcomes 1/3    Precautions: standard and HTN, osteoporosis     Pattern of pain determined: 1 PEP    Subjective   Aimee Palumbo reports the dorian in her R leg has been bothering her some when she is moving certain ways. It feels like a painful pulling. She plans to contact the doctor office. Reports the pain is about the same. The stretching / reaching is better. The back pain is better but continues to get the leg pain. She has been doing seated reaches. She is not sure if anything is different.     Pt stated when she reaches up she doesn't have pain like she did last week.    Patient reports tolerating previous visit first tx session.   Patient reports their pain to be  0/10 on a 0-10 scale with 0 being no pain and 10 being the worst pain imaginable.  Pain Location: L sided low back into the LLE      Occupation: just retired   Hobbies: gabrielle, plays cards, go to the [email or web address]       Patients goals: to be strong enough to go on trip to [place]     Objective     Baseline IM Testing Results:   Date of testin2024  ROM 0-60 deg   Max  Peak Torque 129    Min Peak Torque 18    Flex/Ext Ratio 7   % below normative data 35%     Outcomes:  Functional Score: 62%  Visit 6 Score: 69/100   Visit 10 Score:   Discharge Score:     Treatment    Aimee received the treatments listed below:      Aimee received neuromuscular education for 8 minutes via participation on the [email or web address] Machine. Therapist assisted patient in isolating and engaging spinal stabilization musculature in order to improve functional ability and postural control. Patient performed exercise with therapist guidance in order to accurately use pacer function, avoid valsalva, and optimally exert effort within a safe and effective range via the Kaylee Exertion Rating Scale. Patient instructed to perform at a midrange of exertion and to complete 15-20 repetitions within appropriate split time, with proper technique, and while maintaining safety.         2/26/2024    10:18 AM   [facility]   Visit Number 8   VAS Pain Rating 3   Treadmill Time (in min.) 3 min   Speed 2.3 mph   Lumbar Weight 62 lbs   Repetitions 20   Rating of Perceived Exertion 3     Aimee participated in neuromuscular re-education activities to improve balance, coordination, proprioception, motor control and/or posture for 10 minutes. The following activities were included:    Fire hydrants 2 x 10 each side red theraband   Bird/dog 2 x 5 repetitions each side    NP:    Open books tactile cues for proper form x 20 repetitions each side    Sidelying external rotation clams green theraband x 20 repetitions each side    Aimee participated in therapeutic exercises to develop strength, endurance, ROM, flexibility, posture, and core stabilization for 19 minutes including:    Treadmill x 3 minutes 2.3 mph, gathering subjective   Peripheral muscle strengthening which included one set of 15-20 repetitions at a slow and controlled 10-13 second per rep pace focused on strengthening supporting musculature in order to improve  body mechanics and functional mobility. Patient and therapist focused on proper form during treatment to ensure optimal strengthening of each targeted muscle group.  Machines utilized include torso rotation, chest press, triceps extension, bicep curl, and upright row. Leg extension, leg curl, leg press, and hip adduction/abduction added visit 3.    Aimee participated in dynamic functional therapeutic activities to improve functional performance and simulate household and community activities for 20 minutes. The following activities were included:    Standing baselines:    Pain with L SG and L rotation     Seated repeated flexion 2 x 10 repetitions    Decrease better   Standing flexion 10 repetitions, no effect   Bridges 10 repetitions ( better than it was last session but still pain end range)    1 x 10 DKTC, better height in bridge and tolerance following this    1 x 10 bridges again   Hip hinge to chair with dowel 2 x 10   Side step red theraband along mat 5 rounds each way     manual therapy techniques: Joint mobilizations were applied to the lumbar spine for 0 minutes, including:    Central PA mobs grade 3-4.     Pt given cold pack for 0 minutes to 0 in 0.    Home Exercises Provided and Patient Education Provided    Home exercises include:   Prone press up sag or standing extensions  Cardio program (V5): -  Lifting education (V11): -  Posture/ Using Lumbar Roll: educated, purchased   Fridge Magnet Discharge handout (date given): -  Equipment at home/gym membership: none     Education provided:   - PT role and Plan of Care    - Home exercise program     Written Home Exercises Provided: yes.  Exercises were reviewed and Aimee was able to demonstrate them prior to the end of the session. Aimee demonstrated good  understanding of the education provided.     See EMR under Patient Instructions for exercises provided 2/5/2024.    Assessment     Arrives with minimal change in symptoms despite compliance with  repeated flexion over the weekend. She does report improved sleep so this is an improvement. She tolerated tx session well. She did report slight improvement in pain with standing baselines L SG and L rotation but minimal. Bridging continues to be painful at end range so ceased and performed repeated DKTC with improvement in pain at end range bridge but not abolishment. She tolerated standing exercises well without complaints of pain. Physical Therapist instructed patient to continue to perform repeated flexion at home to assess changes in her pain. She did report feeling good post tx session.     Patient is making good progress towards established goals.  Pt will continue to benefit from skilled outpatient physical therapy to address the deficits stated in the impairment chart, provide pt/family education and to maximize pt's level of independence in the home and community environment.     Anticipated Barriers for therapy: co morbidities   Pt's spiritual, cultural and educational needs considered and pt agreeable to plan of care and goals as stated below:     Goals:    Short term goals:  6 weeks or 10 visits   - Pt will demonstrate increased lumbar MedX ROM by at least 3 degrees from the initial ROM value with improvements noted in functional ROM and ability to perform ADLs. Appropriate and Ongoing  - Pt will demonstrate increased MedX average isometric strength value by 15% from initial test resulting in improved ability to perform bending, lifting, and carrying activities safely, confidently. Appropriate and Ongoing  - Pt will report a reduction in worst pain score by 1-2 points for improved tolerance for bending and sitting. Appropriate and Ongoing  - Pt able to perform HEP correctly with minimal cueing or supervision from therapist to encourage independent management of symptoms. Appropriate and Ongoing     Long term goals: 10 weeks or 20 visits   - Pt will demonstrate increased lumbar MedX ROM by at least 6  degrees from initial ROM value, resulting in improved ability to perform functional forward bending while standing and sitting. Appropriate and Ongoing  - Pt will demonstrate increased MedX average isometric strength value by 30% from initial test resulting in improved ability to perform bending, lifting, and carrying activities safely and confidently. Appropriate and Ongoing  - Pt to demonstrate ability to independently control and reduce their pain through posture positioning and mechanical movements throughout a typical day. Appropriate and Ongoing  - Pt will demonstrate reduced pain and improved functional outcomes as reported on the FOTO by reaching an intake score of >/= 70% functional ability in order to demonstrate subjective improvement in patient's condition. . Appropriate and Ongoing  - Pt will demonstrate independence with the HEP at discharge. Appropriate and Ongoing  - Patient will report >/= 50% improvement since evaluation to improve functional mobility and quality of life Appropriate and Ongoing    Plan   Continue with established Plan of Care towards established PT goals.     La Whaley, PT  2/26/2024

## 2024-02-28 ENCOUNTER — TELEPHONE (OUTPATIENT)
Dept: ORTHOPEDICS | Facility: CLINIC | Age: 74
End: 2024-02-28
Payer: MEDICARE

## 2024-02-28 NOTE — TELEPHONE ENCOUNTER
----- Message from Juanita Callaway sent at 2/28/2024  2:31 PM CST -----  Contact: Self  Type:  Patient Requesting A Return Call    Who Called:  Patient  Who Left Message for Patient:  N/A-asking to speak to the nurse  Does the patient know what this is regarding?: Yes regarding her femur dorian  Best Call Back Number:  [phone]  Additional Information:  Thank You

## 2024-02-29 NOTE — PROGRESS NOTES
[facility]      Name: Aimee Palumbo  Clinic Number: [id number]    Therapy Diagnosis:   No diagnosis found.      Physician: Andrés Hunt MD    Visit Date: 3/1/2024    Physician Orders: PT Eval and Treat  Medical Diagnosis from Referral:   Diagnosis   M54.42,G89.29 (ICD-10-CM) - Chronic bilateral low back pain with left-sided sciatica      Evaluation Date: 2024  Authorization Period Expiration: 2024  Plan of Care Expiration: 2024  Progress Note Due: 3/2/2024  Date of Surgery: none  Visit # / Visits authorized:     (healthy back )  FOTO: 1/ 3    PTA Visit #: 0/5     Time In:   Time Out:   Total Billable Time:  minutes 1:1 time   INSURANCE and OUTCOMES: Fee for Service with FOTO Outcomes 1/3    Precautions: standard and HTN, osteoporosis     Pattern of pain determined: 1 PEP    Subjective   Aimee Palumbo reports       Pt stated when she reaches up she doesn't have pain like she did last week.    Patient reports tolerating previous visit first tx session.   Patient reports their pain to be  0/10 on a 0-10 scale with 0 being no pain and 10 being the worst pain imaginable.  Pain Location: L sided low back into the LLE      Occupation: just retired   Hobbies: gabrielle, plays [employer], go to the casino       Patients goals: to be strong enough to go on trip to [place]     Objective     Baseline IM Testing Results:   Date of testin2024  ROM 0-60 deg   Max Peak Torque 129    Min Peak Torque 18    Flex/Ext Ratio 7   % below normative data 35%     Outcomes:  Functional Score: 62%  Visit 6 Score: 69/100   Visit 10 Score:   Discharge Score:     Treatment    Aimee received the treatments listed below:      Aimee received neuromuscular education for 8 minutes via participation on the [employer] Machine. Therapist assisted patient in isolating and engaging spinal stabilization musculature in order to improve functional ability and postural control.  Patient performed exercise with therapist guidance in order to accurately use pacer function, avoid valsalva, and optimally exert effort within a safe and effective range via the Kaylee Exertion Rating Scale. Patient instructed to perform at a midrange of exertion and to complete 15-20 repetitions within appropriate split time, with proper technique, and while maintaining safety.       Aimee participated in neuromuscular re-education activities to improve balance, coordination, proprioception, motor control and/or posture for 10 minutes. The following activities were included:    Fire hydrants 2 x 10 each side red theraband   Bird/dog 2 x 5 repetitions each side    NP:    Open books tactile cues for proper form x 20 repetitions each side    Sidelying external rotation clams green theraband x 20 repetitions each side    Aimee participated in therapeutic exercises to develop strength, endurance, ROM, flexibility, posture, and core stabilization for 19 minutes including:    Treadmill x 3 minutes 2.3 mph, gathering subjective   Peripheral muscle strengthening which included one set of 15-20 repetitions at a slow and controlled 10-13 second per rep pace focused on strengthening supporting musculature in order to improve body mechanics and functional mobility. Patient and therapist focused on proper form during treatment to ensure optimal strengthening of each targeted muscle group.  Machines utilized include torso rotation, chest press, triceps extension, bicep curl, and upright row. Leg extension, leg curl, leg press, and hip adduction/abduction added visit 3.    Aimee participated in dynamic functional therapeutic activities to improve functional performance and simulate household and community activities for 20 minutes. The following activities were included:    Standing baselines:    Pain with L SG and L rotation     Seated repeated flexion 2 x 10 repetitions    Decrease better   Standing flexion 10 repetitions,  no effect   Bridges 10 repetitions ( better than it was last session but still pain end range)    1 x 10 DKTC, better height in bridge and tolerance following this    1 x 10 bridges again   Hip hinge to chair with dowel 2 x 10   Side step red theraband along mat 5 rounds each way     manual therapy techniques: Joint mobilizations were applied to the lumbar spine for 0 minutes, including:    Central PA mobs grade 3-4.     Pt given cold pack for 0 minutes to 0 in 0.    Home Exercises Provided and Patient Education Provided    Home exercises include:   Prone press up sag or standing extensions  Cardio program (V5): -  Lifting education (V11): -  Posture/ Using Lumbar Roll: educated, purchased   Fridge Magnet Discharge handout (date given): -  Equipment at home/gym membership: none     Education provided:   - PT role and Plan of Care    - Home exercise program     Written Home Exercises Provided: yes.  Exercises were reviewed and Aimee was able to demonstrate them prior to the end of the session. Aimee demonstrated good  understanding of the education provided.     See EMR under Patient Instructions for exercises provided 2/5/2024.    Assessment           Arrives with minimal change in symptoms despite compliance with repeated flexion over the weekend. She does report improved sleep so this is an improvement. She tolerated tx session well. She did report slight improvement in pain with standing baselines L SG and L rotation but minimal. Bridging continues to be painful at end range so ceased and performed repeated DKTC with improvement in pain at end range bridge but not abolishment. She tolerated standing exercises well without complaints of pain. Physical Therapist instructed patient to continue to perform repeated flexion at home to assess changes in her pain. She did report feeling good post tx session.     Patient is making good progress towards established goals.  Pt will continue to benefit from skilled  outpatient physical therapy to address the deficits stated in the impairment chart, provide pt/family education and to maximize pt's level of independence in the home and community environment.     Anticipated Barriers for therapy: co morbidities   Pt's spiritual, cultural and educational needs considered and pt agreeable to plan of care and goals as stated below:     Goals:    Short term goals:  6 weeks or 10 visits   - Pt will demonstrate increased lumbar MedX ROM by at least 3 degrees from the initial ROM value with improvements noted in functional ROM and ability to perform ADLs. Appropriate and Ongoing  - Pt will demonstrate increased MedX average isometric strength value by 15% from initial test resulting in improved ability to perform bending, lifting, and carrying activities safely, confidently. Appropriate and Ongoing  - Pt will report a reduction in worst pain score by 1-2 points for improved tolerance for bending and sitting. Appropriate and Ongoing  - Pt able to perform HEP correctly with minimal cueing or supervision from therapist to encourage independent management of symptoms. Appropriate and Ongoing     Long term goals: 10 weeks or 20 visits   - Pt will demonstrate increased lumbar MedX ROM by at least 6 degrees from initial ROM value, resulting in improved ability to perform functional forward bending while standing and sitting. Appropriate and Ongoing  - Pt will demonstrate increased MedX average isometric strength value by 30% from initial test resulting in improved ability to perform bending, lifting, and carrying activities safely and confidently. Appropriate and Ongoing  - Pt to demonstrate ability to independently control and reduce their pain through posture positioning and mechanical movements throughout a typical day. Appropriate and Ongoing  - Pt will demonstrate reduced pain and improved functional outcomes as reported on the FOTO by reaching an intake score of >/= 70% functional ability  in order to demonstrate subjective improvement in patient's condition. . Appropriate and Ongoing  - Pt will demonstrate independence with the HEP at discharge. Appropriate and Ongoing  - Patient will report >/= 50% improvement since evaluation to improve functional mobility and quality of life Appropriate and Ongoing    Plan   Continue with established Plan of Care towards established PT goals.     Wilma Ruiz, PTA  2/29/2024

## 2024-03-01 ENCOUNTER — CLINICAL SUPPORT (OUTPATIENT)
Dept: REHABILITATION | Facility: HOSPITAL | Age: 74
End: 2024-03-01
Payer: MEDICARE

## 2024-03-01 DIAGNOSIS — R29.3 POOR POSTURE: ICD-10-CM

## 2024-03-01 DIAGNOSIS — M53.86 DECREASED RANGE OF MOTION OF LUMBAR SPINE: ICD-10-CM

## 2024-03-01 DIAGNOSIS — R29.898 DECREASED STRENGTH OF LOWER EXTREMITY: Primary | ICD-10-CM

## 2024-03-01 PROCEDURE — 97112 NEUROMUSCULAR REEDUCATION: CPT | Mod: PN

## 2024-03-01 PROCEDURE — 97530 THERAPEUTIC ACTIVITIES: CPT | Mod: PN

## 2024-03-01 PROCEDURE — 97110 THERAPEUTIC EXERCISES: CPT | Mod: PN

## 2024-03-01 NOTE — PROGRESS NOTES
[facility]      Name: Aimee Palumbo  Clinic Number: [id number]    Therapy Diagnosis:   Encounter Diagnoses   Name Primary?    Decreased strength of lower extremity Yes    Poor posture     Decreased range of motion of lumbar spine        Physician: Andrés Hunt MD    Visit Date: 3/1/2024    Physician Orders: PT Eval and Treat  Medical Diagnosis from Referral:   Diagnosis   M54.42,G89.29 (ICD-10-CM) - Chronic bilateral low back pain with left-sided sciatica      Evaluation Date: 2024  Authorization Period Expiration: 2024  Plan of Care Expiration: 2024  Progress Note Due: 3/2/2024  Date of Surgery: none  Visit # / Visits authorized:   (healthy [facility] )  FOTO: 1/ 3    PTA Visit #: 0/5     Time In: 905 AM  Time Out: 1003 AM  Total Billable Time: 58    INSURANCE and OUTCOMES: Fee for Service with FOTO Outcomes 1/3    Precautions: standard and HTN, osteoporosis     Pattern of pain determined: 4 PEN ( responds better to flexion verse extension)     Subjective   Aimee Palumbo reports the pain has been better. She also had been wearing her boot though. She has been doing seated reaches 5 x / day 10 repetitions.     Patient reports tolerating previous visit first tx session.   Patient reports their pain to be  0/10 on a 0-10 scale with 0 being no pain and 10 being the worst pain imaginable.  Pain Location: L sided low back into the LLE      Occupation: just retired   Hobbies: gabrielle, plays cards, go to the [employer]     Patients goals: to be strong enough to go on trip to [place]     Objective     Baseline IM Testing Results:   Date of testin2024  ROM 0-60 deg   Max Peak Torque 129    Min Peak Torque 18    Flex/Ext Ratio 7   % below normative data 35%     Outcomes:  Functional Score: 62%  Visit 6 Score: 69/100   Visit 10 Score:   Discharge Score:     Treatment    Aimee received the treatments listed below:      Aimee received neuromuscular  education for 8 minutes via participation on the [employer] Machine. Therapist assisted patient in isolating and engaging spinal stabilization musculature in order to improve functional ability and postural control. Patient performed exercise with therapist guidance in order to accurately use pacer function, avoid valsalva, and optimally exert effort within a safe and effective range via the Kaylee Exertion Rating Scale. Patient instructed to perform at a midrange of exertion and to complete 15-20 repetitions within appropriate split time, with proper technique, and while maintaining safety.         2/26/2024    10:18 AM   [facility]   Visit Number 8   VAS Pain Rating 3   Treadmill Time (in min.) 3 min   Speed 2.3 mph   Lumbar Weight 62 lbs   Repetitions 20   Rating of Perceived Exertion 3     Aimee participated in neuromuscular re-education activities to improve balance, coordination, proprioception, motor control and/or posture for 20 minutes. The following activities were included:    + abdominal crunch central 15 x 5 second  + PPT 15 x 5 second   Fire hydrants 2 x 10 each side red theraband   Bird/dog 2 x 5 repetitions each side  Open books tactile cues for proper form scapular leading x 10 repetitions each side      NP:   Sidelying external rotation clams green theraband x 20 repetitions each side    Aimee participated in therapeutic exercises to develop strength, endurance, ROM, flexibility, posture, and core stabilization for 20 minutes including:    Treadmill x 3 minutes 2.3 mph, gathering subjective   Peripheral muscle strengthening which included one set of 15-20 repetitions at a slow and controlled 10-13 second per rep pace focused on strengthening supporting musculature in order to improve body mechanics and functional mobility. Patient and therapist focused on proper form during treatment to ensure optimal strengthening of each targeted muscle group.  Machines utilized include torso  rotation, chest press, triceps extension, bicep curl, and upright row. Leg extension, leg curl, leg press, and hip adduction/abduction added visit 3.    Aimee participated in dynamic functional therapeutic activities to improve functional performance and simulate household and community activities for 10 minutes. The following activities were included:    Standing baselines:    Pain with L SG and L rotation     Seated repeated flexion with overpressure grabbing legs of chair 10 repetitions , decrease better with L SG and L rotation     Seated repeated flexion second set better but slight continuation , centralization     Standing repeated flexion with overpressure on legs 10 repetitions, significantly better - updated HEP with this     NP:   Hip hinge to chair with dowel 2 x 10   Side step red theraband along mat 5 rounds each way     manual therapy techniques: Joint mobilizations were applied to the lumbar spine for 0 minutes, including:    Central PA mobs grade 3-4.     Pt given cold pack for 0 minutes to 0 in 0.    Home Exercises Provided and Patient Education Provided    Home exercises include:     Standing repeated flexion with overpressure on legs - updated 3/1/2024    Cardio program (V5): -  Lifting education (V11): -  Posture/ Using Lumbar Roll: educated, purchased   Fridge Magnet Discharge handout (date given): -  Equipment at home/gym membership: none     Education provided:   - PT role and Plan of Care    - Home exercise program     Written Home Exercises Provided: yes.  Exercises were reviewed and Aimee was able to demonstrate them prior to the end of the session. Aimee demonstrated good  understanding of the education provided.     See EMR under Patient Instructions for exercises provided 2/5/2024.    Assessment     Arrives with minimal pain with reports of improvement in pain since last tx session. She has been compliant with repeated movements at home with seated flexion reaches. Standing lumbar  assessment exposes slight L hip and L calf pain with L SG and L rotation. This improved following repeated flexion with overpressure on legs but slight continuation of symptoms. Therefore added standing repeated flexion with overpressure with greater improvement in pain. Medx increased by 5%. She is progressing well and is responding better with repeated flexion verse extension so updated pain pattern to 4 PEN.     Patient is making good progress towards established goals.  Pt will continue to benefit from skilled outpatient physical therapy to address the deficits stated in the impairment chart, provide pt/family education and to maximize pt's level of independence in the home and community environment.     Anticipated Barriers for therapy: co morbidities   Pt's spiritual, cultural and educational needs considered and pt agreeable to plan of care and goals as stated below:     Goals:    Short term goals:  6 weeks or 10 visits   - Pt will demonstrate increased lumbar MedX ROM by at least 3 degrees from the initial ROM value with improvements noted in functional ROM and ability to perform ADLs. Appropriate and Ongoing  - Pt will demonstrate increased MedX average isometric strength value by 15% from initial test resulting in improved ability to perform bending, lifting, and carrying activities safely, confidently. Appropriate and Ongoing  - Pt will report a reduction in worst pain score by 1-2 points for improved tolerance for bending and sitting. Appropriate and Ongoing  - Pt able to perform HEP correctly with minimal cueing or supervision from therapist to encourage independent management of symptoms. Appropriate and Ongoing     Long term goals: 10 weeks or 20 visits   - Pt will demonstrate increased lumbar MedX ROM by at least 6 degrees from initial ROM value, resulting in improved ability to perform functional forward bending while standing and sitting. Appropriate and Ongoing  - Pt will demonstrate increased  MedX average isometric strength value by 30% from initial test resulting in improved ability to perform bending, lifting, and carrying activities safely and confidently. Appropriate and Ongoing  - Pt to demonstrate ability to independently control and reduce their pain through posture positioning and mechanical movements throughout a typical day. Appropriate and Ongoing  - Pt will demonstrate reduced pain and improved functional outcomes as reported on the FOTO by reaching an intake score of >/= 70% functional ability in order to demonstrate subjective improvement in patient's condition. . Appropriate and Ongoing  - Pt will demonstrate independence with the HEP at discharge. Appropriate and Ongoing  - Patient will report >/= 50% improvement since evaluation to improve functional mobility and quality of life Appropriate and Ongoing    Plan   Continue with established Plan of Care towards established PT goals.     Medx testing.     La Whaley, PT  3/1/2024

## 2024-03-04 ENCOUNTER — CLINICAL SUPPORT (OUTPATIENT)
Dept: REHABILITATION | Facility: HOSPITAL | Age: 74
End: 2024-03-04
Payer: MEDICARE

## 2024-03-04 DIAGNOSIS — M53.86 DECREASED RANGE OF MOTION OF LUMBAR SPINE: ICD-10-CM

## 2024-03-04 DIAGNOSIS — R29.3 POOR POSTURE: ICD-10-CM

## 2024-03-04 DIAGNOSIS — R29.898 DECREASED STRENGTH OF LOWER EXTREMITY: Primary | ICD-10-CM

## 2024-03-04 PROCEDURE — 97530 THERAPEUTIC ACTIVITIES: CPT | Mod: PN

## 2024-03-04 PROCEDURE — 97112 NEUROMUSCULAR REEDUCATION: CPT | Mod: PN

## 2024-03-04 NOTE — PROGRESS NOTES
[facility]      Name: Aimee Palumbo  Clinic Number: [id number]    Therapy Diagnosis:   Encounter Diagnoses   Name Primary?    Decreased strength of lower extremity Yes    Poor posture     Decreased range of motion of lumbar spine        Physician: Andrés Hunt MD    Visit Date: 3/4/2024    Physician Orders: PT Eval and Treat  Medical Diagnosis from Referral:   Diagnosis   M54.42,G89.29 (ICD-10-CM) - Chronic bilateral low back pain with left-sided sciatica      Evaluation Date: 2024  Authorization Period Expiration: 2024  Plan of Care Expiration: 2024  Progress Note Due: 3/2/2024  Date of Surgery: none  Visit # / Visits authorized: 10 /20  (healthy back 10 /20)  FOTO: 1/ 3    PTA Visit #: 0/5     Time In: 1233   Time Out: 1330  Total Billable Time: 34    INSURANCE and OUTCOMES: Fee for Service with FOTO Outcomes 1/3    Precautions: standard and HTN, osteoporosis     Pattern of pain determined: 4 PEN ( responds better to flexion verse extension)     Subjective   Aimee Palumbo reports pain continues to be better. She reports she walked for 30 minutes the past few days.     Patient reports tolerating previous visit first tx session.   Patient reports their pain to be  0/10 on a 0-10 scale with 0 being no pain and 10 being the worst pain imaginable.  Pain Location: L sided low back into the LLE      Occupation: just retired   Hobbies: gabrielle, plays cards, go to the [employer]     Patients goals: to be strong enough to go on trip to [place]     Objective     Baseline IM Testing Results:   Date of testin2024  ROM 0-60 deg   Max Peak Torque 129    Min Peak Torque 18    Flex/Ext Ratio 7   % below normative data 35%     Outcomes:  Functional Score: 62%  Visit 6 Score: 69/100   Visit 10 Score:   Discharge Score:     Treatment    Aimee received the treatments listed below:      Physical Therapy technician assisted with treatment under direct supervision of  treating therapist. Patient received 1:1 treatments for 34 minutes with Physical Therapist.     Aimee received neuromuscular education for 8 minutes via participation on the [employer] Machine. Therapist assisted patient in isolating and engaging spinal stabilization musculature in order to improve functional ability and postural control. Patient performed exercise with therapist guidance in order to accurately use pacer function, avoid valsalva, and optimally exert effort within a safe and effective range via the Kaylee Exertion Rating Scale. Patient instructed to perform at a midrange of exertion and to complete 15-20 repetitions within appropriate split time, with proper technique, and while maintaining safety.         3/1/2024    10:01 AM   [facility]   Visit Number 9   VAS Pain Rating 0   Treadmill Time (in min.) 3 min   Speed 2.3 mph   Lumbar Weight 65 lbs   Repetitions 16   Rating of Perceived Exertion 5     Aimee participated in neuromuscular re-education activities to improve balance, coordination, proprioception, motor control and/or posture for 20 minutes. The following activities were included:    Abdominal crunch central 15 x 5 second  PPT + march 10 repetitions each LE   Fire hydrants 2 x 10 each side red theraband   Bird/dog 2 x 5 repetitions each side  Open books tactile cues for proper form scapular leading x 10 repetitions each side  red theraband     NP:   Sidelying external rotation clams green theraband x 20 repetitions each side    Aimee participated in therapeutic exercises to develop strength, endurance, ROM, flexibility, posture, and core stabilization for 26 minutes including:    Treadmill x 3 minutes 2.3 mph, gathering subjective   Peripheral muscle strengthening which included one set of 15-20 repetitions at a slow and controlled 10-13 second per rep pace focused on strengthening supporting musculature in order to improve body mechanics and functional mobility. Patient and  therapist focused on proper form during treatment to ensure optimal strengthening of each targeted muscle group.  Machines utilized include torso rotation, chest press, triceps extension, bicep curl, and upright row. Leg extension, leg curl, leg press, and hip adduction/abduction added visit 3.    Aimee participated in dynamic functional therapeutic activities to improve functional performance and simulate household and community activities for 3 minutes. The following activities were included:    Standing baselines:    Pain with L SG and L rotation   Standing flexion with self overpressure 10 repetitions , decrease better   DKTC 10 repetitions , decrease in L sided low back and hip pain following when rechecking march with PPT    NP:   Hip hinge to chair with dowel 2 x 10   Side step red theraband along mat 5 rounds each way     manual therapy techniques: Joint mobilizations were applied to the lumbar spine for 0 minutes, including:    Central PA mobs grade 3-4.     Pt given cold pack for 0 minutes to 0 in 0.    Home Exercises Provided and Patient Education Provided    Home exercises include:     Standing repeated flexion with overpressure on legs - updated 3/1/2024    Cardio program (V5): -  Lifting education (V11): -  Posture/ Using Lumbar Roll: educated, purchased   Fridge Magnet Discharge handout (date given): -  Equipment at home/gym membership: none     Education provided:   - PT role and Plan of Care    - Home exercise program     Written Home Exercises Provided: yes.  Exercises were reviewed and Aimee was able to demonstrate them prior to the end of the session. Aimee demonstrated good  understanding of the education provided.     See EMR under Patient Instructions for exercises provided 2/5/2024.    Assessment     Continues to arrive with improvement in her pain. She is very compliant with her HEP which includes repeated flexion. She continues to respond better to flexion verse extension. Noreen  continue to have some pain with these but it is better overall. She did report some pain after performing PPT with march on the L side so ceased and performed DKTC and assessed change after this in which she reported better. Medx weight continues to be an appropriate challenge. She is making good progress towards goals.     Patient is making good progress towards established goals.  Pt will continue to benefit from skilled outpatient physical therapy to address the deficits stated in the impairment chart, provide pt/family education and to maximize pt's level of independence in the home and community environment.     Anticipated Barriers for therapy: co morbidities   Pt's spiritual, cultural and educational needs considered and pt agreeable to plan of care and goals as stated below:     Goals:    Short term goals:  6 weeks or 10 visits   - Pt will demonstrate increased lumbar MedX ROM by at least 3 degrees from the initial ROM value with improvements noted in functional ROM and ability to perform ADLs. Appropriate and Ongoing  - Pt will demonstrate increased MedX average isometric strength value by 15% from initial test resulting in improved ability to perform bending, lifting, and carrying activities safely, confidently. Appropriate and Ongoing  - Pt will report a reduction in worst pain score by 1-2 points for improved tolerance for bending and sitting. Appropriate and Ongoing  - Pt able to perform HEP correctly with minimal cueing or supervision from therapist to encourage independent management of symptoms. Appropriate and Ongoing     Long term goals: 10 weeks or 20 visits   - Pt will demonstrate increased lumbar MedX ROM by at least 6 degrees from initial ROM value, resulting in improved ability to perform functional forward bending while standing and sitting. Appropriate and Ongoing  - Pt will demonstrate increased MedX average isometric strength value by 30% from initial test resulting in improved ability to  perform bending, lifting, and carrying activities safely and confidently. Appropriate and Ongoing  - Pt to demonstrate ability to independently control and reduce their pain through posture positioning and mechanical movements throughout a typical day. Appropriate and Ongoing  - Pt will demonstrate reduced pain and improved functional outcomes as reported on the FOTO by reaching an intake score of >/= 70% functional ability in order to demonstrate subjective improvement in patient's condition. . Appropriate and Ongoing  - Pt will demonstrate independence with the HEP at discharge. Appropriate and Ongoing  - Patient will report >/= 50% improvement since evaluation to improve functional mobility and quality of life Appropriate and Ongoing    Plan   Continue with established Plan of Care towards established PT goals.     La Whaley, PT  3/4/2024

## 2024-03-08 ENCOUNTER — CLINICAL SUPPORT (OUTPATIENT)
Dept: REHABILITATION | Facility: HOSPITAL | Age: 74
End: 2024-03-08
Payer: MEDICARE

## 2024-03-08 DIAGNOSIS — R29.3 POOR POSTURE: ICD-10-CM

## 2024-03-08 DIAGNOSIS — M53.86 DECREASED RANGE OF MOTION OF LUMBAR SPINE: ICD-10-CM

## 2024-03-08 DIAGNOSIS — R29.898 DECREASED STRENGTH OF LOWER EXTREMITY: Primary | ICD-10-CM

## 2024-03-08 PROCEDURE — 97110 THERAPEUTIC EXERCISES: CPT | Mod: PN

## 2024-03-08 PROCEDURE — 97112 NEUROMUSCULAR REEDUCATION: CPT | Mod: PN

## 2024-03-08 PROCEDURE — 97530 THERAPEUTIC ACTIVITIES: CPT | Mod: PN

## 2024-03-08 NOTE — PROGRESS NOTES
[facility]      Name: Aimee Palumbo  Clinic Number: [id number]    Therapy Diagnosis:   Encounter Diagnoses   Name Primary?    Decreased strength of lower extremity Yes    Poor posture     Decreased range of motion of lumbar spine        Physician: Andrés Hunt MD    Visit Date: 3/8/2024    Physician Orders: PT Eval and Treat  Medical Diagnosis from Referral:   Diagnosis   M54.42,G89.29 (ICD-10-CM) - Chronic bilateral low back pain with left-sided sciatica      Evaluation Date: 2024  Authorization Period Expiration: 2024  Plan of Care Expiration: 2024  Progress Note Due: 3/2/2024  Date of Surgery: none  Visit # / Visits authorized:   (healthy back )  FOTO: 3/ 3    PTA Visit #: 0/5     Time In: 0900  Time Out:   Total Billable Time: 34    INSURANCE and OUTCOMES: Fee for Service with FOTO Outcomes 1/3    Precautions: standard and HTN, osteoporosis     Pattern of pain determined: 4 PEN ( responds better to flexion verse extension)     Subjective   Aimee Palumbo reports she is feeling great!   Has not been doing a lot of walking due to the weather.    Patient reports tolerating previous visit first tx session.   Patient reports their pain to be  0/10 on a 0-10 scale with 0 being no pain and 10 being the worst pain imaginable.  Pain Location: L sided low back into the LLE      Occupation: just retired   Hobbies: gabrielle, plays cards, go to the [employer]     Patients goals: to be strong enough to go on trip to [place]     Objective     Baseline IM Testing Results:   Date of testin2024  ROM 0-60 deg   Max Peak Torque 129    Min Peak Torque 18    Flex/Ext Ratio 7   % below normative data 35%     Outcomes:  Functional Score: 62%  Visit 6 Score: 69/100   Visit 11 Score: 67/100  Discharge Score:         Treatment    Aimee received the treatments listed below:      Physical Therapy technician assisted with treatment under direct supervision of  treating therapist. Patient received 1:1 treatments for 34 minutes with Physical Therapist.     Aimee received neuromuscular education for 10 minutes via participation on the [employer] Machine. Therapist assisted patient in isolating and engaging spinal stabilization musculature in order to improve functional ability and postural control. Patient performed exercise with therapist guidance in order to accurately use pacer function, avoid valsalva, and optimally exert effort within a safe and effective range via the Kaylee Exertion Rating Scale. Patient instructed to perform at a midrange of exertion and to complete 15-20 repetitions within appropriate split time, with proper technique, and while maintaining safety.         3/8/2024     9:58 AM   [facility]   Visit Number 11   VAS Pain Rating 0   Treadmill Time (in min.) 7 min   Speed 2.3 mph   Lumbar Weight 65 lbs   Repetitions 16   Rating of Perceived Exertion 4.5         Aimee participated in neuromuscular re-education activities to improve balance, coordination, proprioception, motor control and/or posture for 20 minutes. The following activities were included:    Abdominal crunch central 15 x 5 second  PPT + march 10 repetitions each LE   Fire hydrants 2 x 10 each side red theraband   Bird/dog 2 x 5 repetitions each side  Open books, with visual tracking of hand for increased range of motion,  tactile cues for proper form scapular leading x 10 repetitions each side  red theraband   Hip hinge to chair with dowel 2 x 10    NP:   Sidelying external rotation clams green theraband x 20 repetitions each side    Aimee participated in therapeutic exercises to develop strength, endurance, ROM, flexibility, posture, and core stabilization for 26 minutes including:    Treadmill x 7 minutes 2.3 mph, gathering subjective     Peripheral muscle strengthening which included one set of 15-20 repetitions at a slow and controlled 10-13 second per rep pace focused on  strengthening supporting musculature in order to improve body mechanics and functional mobility. Patient and therapist focused on proper form during treatment to ensure optimal strengthening of each targeted muscle group.  Machines utilized include torso rotation, chest press, triceps extension, bicep curl, and upright row. Leg extension, leg curl, leg press, and hip adduction/abduction added visit 3.    Aimee participated in dynamic functional therapeutic activities to improve functional performance and simulate household and community activities for 15 minutes. The following activities were included:    Standing baselines:    Pain with L SG and L rotation   Standing flexion with self overpressure 10 repetitions , decrease better   DKTC 10 repetitions , decrease in L sided low back and hip pain following when rechecking march with PPT    03-: lifting education per protocol x 15 mins - handout issued with instruction, practice and review.  She was able to demonstrate al techniques properly and safely.    NP:      Side step red theraband along mat 5 rounds each way       manual therapy techniques: Joint mobilizations were applied to the lumbar spine for 0 minutes, including:    Central PA mobs grade 3-4.     Pt given cold pack for 0 minutes to 0 in 0.    Home Exercises Provided and Patient Education Provided    Home exercises include:     Standing repeated flexion with overpressure on legs - updated 3/1/2024    Cardio program (V5): -  Lifting education (V11): - 03-  Posture/ Using Lumbar Roll: educated, purchased   [employer] Discharge handout (date given): -  Equipment at home/gym membership: none     Education provided:   - PT role and Plan of Care    - Home exercise program     Written Home Exercises Provided: yes.  Exercises were reviewed and Aimee was able to demonstrate them prior to the end of the session. Aimee demonstrated good  understanding of the education provided.     See EMR  under Patient Instructions for exercises provided 2/5/2024.    Assessment     Continues to arrive with improvement in her symptoms. She continues to respond better to flexion verse extension. Bridges continue to have some pain with these but it is better overall. Medx weight and peripheral machines resistance continues to be an appropriate challenge. She is making good progress towards goals.   Functional improvement noted via FOTO score increased by 5% since initial evaluation.     Patient is making good progress towards established goals.  Pt will continue to benefit from skilled outpatient physical therapy to address the deficits stated in the impairment chart, provide pt/family education and to maximize pt's level of independence in the home and community environment.     Anticipated Barriers for therapy: co morbidities   Pt's spiritual, cultural and educational needs considered and pt agreeable to plan of care and goals as stated below:     Goals:    Short term goals:  6 weeks or 10 visits   - Pt will demonstrate increased lumbar MedX ROM by at least 3 degrees from the initial ROM value with improvements noted in functional ROM and ability to perform ADLs. Appropriate and Ongoing  - Pt will demonstrate increased MedX average isometric strength value by 15% from initial test resulting in improved ability to perform bending, lifting, and carrying activities safely, confidently. Appropriate and Ongoing  - Pt will report a reduction in worst pain score by 1-2 points for improved tolerance for bending and sitting. Appropriate and Ongoing  - Pt able to perform HEP correctly with minimal cueing or supervision from therapist to encourage independent management of symptoms. Appropriate and Ongoing     Long term goals: 10 weeks or 20 visits   - Pt will demonstrate increased lumbar MedX ROM by at least 6 degrees from initial ROM value, resulting in improved ability to perform functional forward bending while standing and  sitting. Appropriate and Ongoing  - Pt will demonstrate increased MedX average isometric strength value by 30% from initial test resulting in improved ability to perform bending, lifting, and carrying activities safely and confidently. Appropriate and Ongoing  - Pt to demonstrate ability to independently control and reduce their pain through posture positioning and mechanical movements throughout a typical day. Appropriate and Ongoing  - Pt will demonstrate reduced pain and improved functional outcomes as reported on the FOTO by reaching an intake score of >/= 70% functional ability in order to demonstrate subjective improvement in patient's condition. . Appropriate and Ongoing  - Pt will demonstrate independence with the HEP at discharge. Appropriate and Ongoing  - Patient will report >/= 50% improvement since evaluation to improve functional mobility and quality of life Appropriate and Ongoing    Plan   Continue with established Plan of Care towards established PT goals.     Shakira Waller, PT CLT  3/8/2024

## 2024-03-11 ENCOUNTER — CLINICAL SUPPORT (OUTPATIENT)
Dept: REHABILITATION | Facility: HOSPITAL | Age: 74
End: 2024-03-11
Payer: MEDICARE

## 2024-03-11 DIAGNOSIS — M53.86 DECREASED RANGE OF MOTION OF LUMBAR SPINE: ICD-10-CM

## 2024-03-11 DIAGNOSIS — R29.3 POOR POSTURE: ICD-10-CM

## 2024-03-11 DIAGNOSIS — R29.898 DECREASED STRENGTH OF LOWER EXTREMITY: Primary | ICD-10-CM

## 2024-03-11 PROCEDURE — 97112 NEUROMUSCULAR REEDUCATION: CPT | Mod: PN

## 2024-03-11 PROCEDURE — 97110 THERAPEUTIC EXERCISES: CPT | Mod: PN

## 2024-03-11 NOTE — PROGRESS NOTES
[facility]      Name: Aimee Palumbo  Clinic Number: [id number]    Therapy Diagnosis:   Encounter Diagnoses   Name Primary?    Decreased strength of lower extremity Yes    Poor posture     Decreased range of motion of lumbar spine        Physician: Andrés Hunt MD    Visit Date: 3/11/2024    Physician Orders: PT Eval and Treat  Medical Diagnosis from Referral:   Diagnosis   M54.42,G89.29 (ICD-10-CM) - Chronic bilateral low back pain with left-sided sciatica      Evaluation Date: 2024  Authorization Period Expiration: 2024  Plan of Care Expiration: 2024  Progress Note Due: 3/2/2024  Date of Surgery: none  Visit # / Visits authorized:   (healthy back )  FOTO: 3/ 3    PTA Visit #: 0/5     Time In: 900 AM  Time Out: 953 AM  Total Billable Time: 53    INSURANCE and OUTCOMES: Fee for Service with FOTO Outcomes 1/3    Precautions: standard and HTN, osteoporosis     Pattern of pain determined: 4 PEN ( responds better to flexion verse extension)     Subjective   Aimee Palumbo reports she continues to be doing good since adding repeated flexion into her HEP. She forgot to try her boots on over the weekend which used to give her pain.     Patient reports tolerating previous visit first tx session.   Patient reports their pain to be  0/10 on a 0-10 scale with 0 being no pain and 10 being the worst pain imaginable.  Pain Location: L sided low back into the LLE      Occupation: just retired   Hobbies: gabrielle, plays cards, go to the [employer]     Patients goals: to be strong enough to go on trip to [place]     Objective     Baseline IM Testing Results:   Date of testin2024  ROM 0-60 deg   Max Peak Torque 129    Min Peak Torque 18    Flex/Ext Ratio 7   % below normative data 35%     Outcomes:  Functional Score: 62%  Visit 6 Score: 69/100   Visit 11 Score: 67/100  Discharge Score:         Treatment    Aimee received the treatments listed below:       Aimee received neuromuscular education for 8 minutes via participation on the [employer] Machine. Therapist assisted patient in isolating and engaging spinal stabilization musculature in order to improve functional ability and postural control. Patient performed exercise with therapist guidance in order to accurately use pacer function, avoid valsalva, and optimally exert effort within a safe and effective range via the Kaylee Exertion Rating Scale. Patient instructed to perform at a midrange of exertion and to complete 15-20 repetitions within appropriate split time, with proper technique, and while maintaining safety.           3/8/2024     9:58 AM   [facility]   Visit Number 11   VAS Pain Rating 0   Treadmill Time (in min.) 7 min   Speed 2.3 mph   Lumbar Weight 65 lbs   Repetitions 16   Rating of Perceived Exertion 4.5       Aimee participated in neuromuscular re-education activities to improve balance, coordination, proprioception, motor control and/or posture for 22 minutes. The following activities were included:    Abdominal crunch central 15 x 5 second  PPT 15 repetitions x 5 second hold   PPT march 10 repetitions each LE   Fire hydrants 3 x 10 each side red theraband   Bird/dog 2 x 5 repetitions each side  Table top toe taps 3 x 5 repetitions each side     NP:   Open books, with visual tracking of hand for increased range of motion,  tactile cues for proper form scapular leading x 10 repetitions each side  red theraband   Hip hinge to chair with dowel 2 x 10  Sidelying external rotation clams green theraband x 20 repetitions each side    Aimee participated in therapeutic exercises to develop strength, endurance, ROM, flexibility, posture, and core stabilization for 20 minutes including:    Treadmill x 7 minutes 2.3 mph, gathering subjective     Peripheral muscle strengthening which included one set of 15-20 repetitions at a slow and controlled 10-13 second per rep pace focused on  strengthening supporting musculature in order to improve body mechanics and functional mobility. Patient and therapist focused on proper form during treatment to ensure optimal strengthening of each targeted muscle group.  Machines utilized include torso rotation, chest press, triceps extension, bicep curl, and upright row. Leg extension, leg curl, leg press, and hip adduction/abduction added visit 3.    Aimee participated in dynamic functional therapeutic activities to improve functional performance and simulate household and community activities for 3 minutes. The following activities were included:    Standing baselines: no pain with standing assessment except slight pain with L rotation but minimal to none   Bridging 20 repetitions     NP:   Standing flexion with self overpressure 10 repetitions , decrease better   DKTC 10 repetitions , decrease in L sided low back and hip pain following when rechecking march with PPT  03-: lifting education per protocol x 15 mins - handout issued with instruction, practice and review.  She was able to demonstrate al techniques properly and safely.  Side step red theraband along mat 5 rounds each way     manual therapy techniques: Joint mobilizations were applied to the lumbar spine for 0 minutes, including:    Central PA mobs grade 3-4.     Pt given cold pack for 0 minutes to 0 in 0.    Home Exercises Provided and Patient Education Provided    Home exercises include:     Standing repeated flexion with overpressure on legs - updated 3/1/2024    Cardio program (V5): -  Lifting education (V11): - 03-  Posture/ Using Lumbar Roll: educated, purchased   [employer] Discharge handout (date given): -  Equipment at home/gym membership: none     Education provided:   - PT role and Plan of Care    - Home exercise program     Written Home Exercises Provided: yes.  Exercises were reviewed and Aimee was able to demonstrate them prior to the end of the session. Aimee  demonstrated good  understanding of the education provided.     See EMR under Patient Instructions for exercises provided 2/5/2024.    Assessment     Continues to report improvement in symptoms without pain upon arrival. Focused on stability and core exercises today. She required intermittent verbal and tactile cues for proper form. She is progressing well. Medx remained the same weight given repetitions at RPE during last few sessions. She was able to perform 18 repetitions at 4/10 RPE. She performed peripheral machines without incident with some progressions made.     Patient is making good progress towards established goals.  Pt will continue to benefit from skilled outpatient physical therapy to address the deficits stated in the impairment chart, provide pt/family education and to maximize pt's level of independence in the home and community environment.     Anticipated Barriers for therapy: co morbidities   Pt's spiritual, cultural and educational needs considered and pt agreeable to plan of care and goals as stated below:     Goals:    Short term goals:  6 weeks or 10 visits   - Pt will demonstrate increased lumbar MedX ROM by at least 3 degrees from the initial ROM value with improvements noted in functional ROM and ability to perform ADLs. Appropriate and Ongoing  - Pt will demonstrate increased MedX average isometric strength value by 15% from initial test resulting in improved ability to perform bending, lifting, and carrying activities safely, confidently. Appropriate and Ongoing  - Pt will report a reduction in worst pain score by 1-2 points for improved tolerance for bending and sitting. Appropriate and Ongoing  - Pt able to perform HEP correctly with minimal cueing or supervision from therapist to encourage independent management of symptoms. Appropriate and Ongoing     Long term goals: 10 weeks or 20 visits   - Pt will demonstrate increased lumbar MedX ROM by at least 6 degrees from initial ROM  value, resulting in improved ability to perform functional forward bending while standing and sitting. Appropriate and Ongoing  - Pt will demonstrate increased MedX average isometric strength value by 30% from initial test resulting in improved ability to perform bending, lifting, and carrying activities safely and confidently. Appropriate and Ongoing  - Pt to demonstrate ability to independently control and reduce their pain through posture positioning and mechanical movements throughout a typical day. Appropriate and Ongoing  - Pt will demonstrate reduced pain and improved functional outcomes as reported on the FOTO by reaching an intake score of >/= 70% functional ability in order to demonstrate subjective improvement in patient's condition. . Appropriate and Ongoing  - Pt will demonstrate independence with the HEP at discharge. Appropriate and Ongoing  - Patient will report >/= 50% improvement since evaluation to improve functional mobility and quality of life Appropriate and Ongoing    Plan   Continue with established Plan of Care towards established PT goals.     La Whaley, PT   3/11/2024

## 2024-03-15 ENCOUNTER — CLINICAL SUPPORT (OUTPATIENT)
Dept: REHABILITATION | Facility: HOSPITAL | Age: 74
End: 2024-03-15
Payer: MEDICARE

## 2024-03-15 DIAGNOSIS — R29.3 POOR POSTURE: ICD-10-CM

## 2024-03-15 DIAGNOSIS — M53.86 DECREASED RANGE OF MOTION OF LUMBAR SPINE: ICD-10-CM

## 2024-03-15 DIAGNOSIS — R29.898 DECREASED STRENGTH OF LOWER EXTREMITY: Primary | ICD-10-CM

## 2024-03-15 PROCEDURE — 97112 NEUROMUSCULAR REEDUCATION: CPT | Mod: PN,CQ

## 2024-03-15 PROCEDURE — 97110 THERAPEUTIC EXERCISES: CPT | Mod: PN,CQ

## 2024-03-15 NOTE — PROGRESS NOTES
[facility]      Name: Aimee Palumbo  Clinic Number: [id number]    Therapy Diagnosis:   Encounter Diagnoses   Name Primary?    Decreased strength of lower extremity Yes    Poor posture     Decreased range of motion of lumbar spine      Physician: Andrés Hutn MD    Visit Date: 3/15/2024    Physician Orders: PT Eval and Treat  Medical Diagnosis from Referral:   Diagnosis   M54.42,G89.29 (ICD-10-CM) - Chronic bilateral low back pain with left-sided sciatica      Evaluation Date: 2024  Authorization Period Expiration: 2024  Plan of Care Expiration: 2024  Progress Note Due: 3/2/2024  Date of Surgery: none  Visit # / Visits authorized:   (healthy back )  FOTO: 3/ 3    PTA Visit #:      Time In:  0900  Time Out: 09  Total Billable Time: 58 minutes    INSURANCE and OUTCOMES: Fee for Service with FOTO Outcomes 1/3    Precautions: standard and HTN, osteoporosis     Pattern of pain determined: 4 PEN ( responds better to flexion verse extension)     Subjective   Aimee Palumbo reports she is not having any pain today    Patient reports tolerating previous visit first tx session.   Patient reports their pain to be  0/10 on a 0-10 scale with 0 being no pain and 10 being the worst pain imaginable.  Pain Location: L sided low back into the LLE      Occupation: just retired   Hobbies: gabrielle, plays cards, go to the [employer]     Patients goals: to be strong enough to go on trip to [place]     Objective     Baseline IM Testing Results:   Date of testin2024  ROM 0-60 deg   Max Peak Torque 129    Min Peak Torque 18    Flex/Ext Ratio 7   % below normative data 35%     Outcomes:  Functional Score: 62%  Visit 6 Score: 69/100   Visit 11 Score: 67/100  Discharge Score:         Treatment    Aimee received the treatments listed below:      Aimee received neuromuscular education for 8 minutes via participation on the [employer] Machine. Therapist  assisted patient in isolating and engaging spinal stabilization musculature in order to improve functional ability and postural control. Patient performed exercise with therapist guidance in order to accurately use pacer function, avoid valsalva, and optimally exert effort within a safe and effective range via the Kaylee Exertion Rating Scale. Patient instructed to perform at a midrange of exertion and to complete 15-20 repetitions within appropriate split time, with proper technique, and while maintaining safety.         3/15/2024    10:01 AM   [facility]   Visit Number 13   VAS Pain Rating 0   Treadmill Time (in min.) 3 min   Speed 2.3 mph   Extension in Standing 20   Lumbar Weight 65 lbs   Repetitions 18   Rating of Perceived Exertion 4        Aimee participated in neuromuscular re-education activities to improve balance, coordination, proprioception, motor control and/or posture for 22 minutes. The following activities were included:    Abdominal crunch central 15 x 5 second  Posterior pelvic tilts 15 repetitions x 5 second hold   Posterior pelvic tilts march 10 repetitions each LE   Fire hydrants 3 x 10 reps each side red theraband   Bird/dog 2 x 5 repetitions each side  Table top toe taps x 5 repetitions each side (pain on Left leg)  Double knee to chest x 5 reps     Not Performed:   Open books, with visual tracking of hand for increased range of motion,  tactile cues for proper form scapular leading x 10 repetitions each side  red theraband   Hip hinge to chair with dowel 2 x 10 reps   Sidelying external rotation clams green theraband x 20 repetitions each side    Aimee participated in therapeutic exercises to develop strength, endurance, ROM, flexibility, posture, and core stabilization for 25 minutes including:    Treadmill x 3 minutes 2.3 mph, gathering subjective     Peripheral muscle strengthening which included one set of 15-20 repetitions at a slow and controlled 10-13 second per rep pace  focused on strengthening supporting musculature in order to improve body mechanics and functional mobility. Patient and therapist focused on proper form during treatment to ensure optimal strengthening of each targeted muscle group.  Machines utilized include torso rotation, chest press, triceps extension, bicep curl, and upright row. Leg extension, leg curl, leg press, and hip adduction/abduction added visit 3.    Aimee participated in dynamic functional therapeutic activities to improve functional performance and simulate household and community activities for 3 minutes. The following activities were included:    Bridging x 20 repetitions (no pain)    NP:   Standing flexion with self overpressure 10 repetitions , decrease better   Double knee to chest x 10 repetitions , decrease in L sided low back and hip pain following when rechecking march with PPT  03-: lifting education per protocol x 15 mins - handout issued with instruction, practice and review.  She was able to demonstrate al techniques properly and safely.  Side step red theraband along mat 5 rounds each way     manual therapy techniques: Joint mobilizations were applied to the lumbar spine for 0 minutes, including:  Central PA mobs grade 3-4.     Pt given cold pack for 0 minutes to 0 in 0.    Home Exercises Provided and Patient Education Provided    Home exercises include:     Standing repeated flexion with overpressure on legs - updated 3/1/2024    Cardio program (V5): -  Lifting education (V11): - 03-  Posture/ Using Lumbar Roll: educated, purchased   Fridge Magnet Discharge handout (date given): -  Equipment at home/gym membership: none     Education provided:   - PT role and Plan of Care    - Home exercise program     Written Home Exercises Provided: yes.  Exercises were reviewed and Aimee was able to demonstrate them prior to the end of the session. Aimee demonstrated good  understanding of the education provided.     See EMR  under Patient Instructions for exercises provided 2/5/2024.    Assessment     Patient presents to PT with no pain today and has been complaint with her Home exercise program.  She did get fatigued after reps #16 on the MedX and had to stop at 18 reps. No discomfort was noted with her other exercises today.         Patient is making good progress towards established goals.  Pt will continue to benefit from skilled outpatient physical therapy to address the deficits stated in the impairment chart, provide pt/family education and to maximize pt's level of independence in the home and community environment.     Anticipated Barriers for therapy: co morbidities   Pt's spiritual, cultural and educational needs considered and pt agreeable to plan of care and goals as stated below:     Goals:    Short term goals:  6 weeks or 10 visits   - Pt will demonstrate increased lumbar MedX ROM by at least 3 degrees from the initial ROM value with improvements noted in functional ROM and ability to perform ADLs. Appropriate and Ongoing  - Pt will demonstrate increased MedX average isometric strength value by 15% from initial test resulting in improved ability to perform bending, lifting, and carrying activities safely, confidently. Appropriate and Ongoing  - Pt will report a reduction in worst pain score by 1-2 points for improved tolerance for bending and sitting. Appropriate and Ongoing  - Pt able to perform HEP correctly with minimal cueing or supervision from therapist to encourage independent management of symptoms. Appropriate and Ongoing     Long term goals: 10 weeks or 20 visits   - Pt will demonstrate increased lumbar MedX ROM by at least 6 degrees from initial ROM value, resulting in improved ability to perform functional forward bending while standing and sitting. Appropriate and Ongoing  - Pt will demonstrate increased MedX average isometric strength value by 30% from initial test resulting in improved ability to perform  bending, lifting, and carrying activities safely and confidently. Appropriate and Ongoing  - Pt to demonstrate ability to independently control and reduce their pain through posture positioning and mechanical movements throughout a typical day. Appropriate and Ongoing  - Pt will demonstrate reduced pain and improved functional outcomes as reported on the FOTO by reaching an intake score of >/= 70% functional ability in order to demonstrate subjective improvement in patient's condition. . Appropriate and Ongoing  - Pt will demonstrate independence with the HEP at discharge. Appropriate and Ongoing  - Patient will report >/= 50% improvement since evaluation to improve functional mobility and quality of life Appropriate and Ongoing    Plan   Continue with established Plan of Care towards established PT goals.     Wilma Ruiz, PTA   3/15/2024

## 2024-03-18 ENCOUNTER — CLINICAL SUPPORT (OUTPATIENT)
Dept: REHABILITATION | Facility: HOSPITAL | Age: 74
End: 2024-03-18
Payer: MEDICARE

## 2024-03-18 DIAGNOSIS — R29.3 POOR POSTURE: ICD-10-CM

## 2024-03-18 DIAGNOSIS — M53.86 DECREASED RANGE OF MOTION OF LUMBAR SPINE: ICD-10-CM

## 2024-03-18 DIAGNOSIS — R29.898 DECREASED STRENGTH OF LOWER EXTREMITY: Primary | ICD-10-CM

## 2024-03-18 PROCEDURE — 97110 THERAPEUTIC EXERCISES: CPT | Mod: PN

## 2024-03-18 PROCEDURE — 97112 NEUROMUSCULAR REEDUCATION: CPT | Mod: PN

## 2024-03-18 PROCEDURE — 97530 THERAPEUTIC ACTIVITIES: CPT | Mod: PN

## 2024-03-18 NOTE — PROGRESS NOTES
[facility]      Name: Aimee Palumbo  Clinic Number: [id number]    Therapy Diagnosis:   Encounter Diagnoses   Name Primary?    Decreased strength of lower extremity Yes    Poor posture     Decreased range of motion of lumbar spine      Physician: Andrés Hunt MD    Visit Date: 3/18/2024    Physician Orders: PT Eval and Treat  Medical Diagnosis from Referral:   Diagnosis   M54.42,G89.29 (ICD-10-CM) - Chronic bilateral low back pain with left-sided sciatica      Evaluation Date: 2024  Authorization Period Expiration: 2024  Plan of Care Expiration: 2024  Progress Note Due: 3/2/2024  Date of Surgery: none  Visit # / Visits authorized:   (healthy back )  FOTO: 3/ 3    PTA Visit #: 0 /5     Time In:  905 AM  Time Out: 1005 AM  Total Billable Time: 60 minutes    INSURANCE and OUTCOMES: Fee for Service with FOTO Outcomes 2 /3    Precautions: standard and HTN, osteoporosis     Pattern of pain determined: 4 PEN ( responds better to flexion verse extension)     Subjective   Aimee Palumbo reports no pain upon arrival. She wore her boots yesterday without pain and no issues this morning either. She has had minimal to no pain in the LLE over the past few days to weeks.     Patient reports tolerating previous visit first tx session.   Patient reports their pain to be  0/10 on a 0-10 scale with 0 being no pain and 10 being the worst pain imaginable.  Pain Location: L sided low back into the LLE      Occupation: just retired   Hobbies: gabrielle, plays cards, go to the casino     Patients goals: to be strong enough to go on trip to [place]     Objective     Baseline IM Testing Results:   Date of testin2024  ROM 0-60 deg   Max Peak Torque 129    Min Peak Torque 18    Flex/Ext Ratio 7   % below normative data 35%     Outcomes:  Functional Score: 62%  Visit 6 Score: 69/100   Visit 11 Score: 67/100  Discharge Score:         Treatment    Aimee received the  treatments listed below:      Aimee received neuromuscular education for 8 minutes via participation on the [employer] Machine. Therapist assisted patient in isolating and engaging spinal stabilization musculature in order to improve functional ability and postural control. Patient performed exercise with therapist guidance in order to accurately use pacer function, avoid valsalva, and optimally exert effort within a safe and effective range via the Kaylee Exertion Rating Scale. Patient instructed to perform at a midrange of exertion and to complete 15-20 repetitions within appropriate split time, with proper technique, and while maintaining safety.         3/18/2024     9:48 AM   [facility]   Visit Number 14   VAS Pain Rating 0   Treadmill Time (in min.) 3 min   Speed 2.3 mph   Lumbar Weight 65 lbs   Repetitions 18   Rating of Perceived Exertion 4        Aimee participated in neuromuscular re-education activities to improve balance, coordination, proprioception, motor control and/or posture for 12 minutes. The following activities were included:    Bird/dog 2 x 5 repetitions each side  Table top toe taps 2 x 10 repetitions each side for core   Prone alternating arm and leg 2 x 5 repetitions each side     Not Performed:     Fire hydrants 3 x 10 reps each side red theraband   Abdominal crunch central 15 x 5 second  Posterior pelvic tilts 15 repetitions x 5 second hold   Posterior pelvic tilts march 10 repetitions each LE   Open books, with visual tracking of hand for increased range of motion,  tactile cues for proper form scapular leading x 10 repetitions each side  red theraband   Hip hinge to chair with dowel 2 x 10 reps   Sidelying external rotation clams green theraband x 20 repetitions each side    Aimee participated in therapeutic exercises to develop strength, endurance, ROM, flexibility, posture, and core stabilization for 20 minutes including:    Treadmill x 3 minutes 2.3 mph, gathering  subjective   Peripheral muscle strengthening which included one set of 15-20 repetitions at a slow and controlled 10-13 second per rep pace focused on strengthening supporting musculature in order to improve body mechanics and functional mobility. Patient and therapist focused on proper form during treatment to ensure optimal strengthening of each targeted muscle group.  Machines utilized include torso rotation, chest press, triceps extension, bicep curl, and upright row. Leg extension, leg curl, leg press, and hip adduction/abduction added visit 3.    Aimee participated in dynamic functional therapeutic activities to improve functional performance and simulate household and community activities for 20 minutes. The following activities were included:    Standing assessment: no pain in all directions, moderate extension limitation    Bridging x 20 repetitions (no pain)  Recovery of function:    Repeated standing extension 10 repetitions, no symptoms    Repeated seated flexion 10 repetitions    Prone press ups 10 repetitions, no symptoms   Standing repeated flexion 10 repetitions     Golfers lift 10 repetitions each side with unilateral UE support   Lateral stepping blue theraband at ankles 5 x 10 ft each way     Home Exercises Provided and Patient Education Provided    Home exercises include:     Standing repeated flexion with overpressure on legs - updated 3/1/2024    Cardio program (V5): -  Lifting education (V11): - 03-  Posture/ Using Lumbar Roll: educated, purchased   [employer] Magnet Discharge handout (date given): -  Equipment at home/gym membership: none     Education provided:   - PT role and Plan of Care    - Home exercise program     Written Home Exercises Provided: yes.  Exercises were reviewed and Aimee was able to demonstrate them prior to the end of the session. Aimee demonstrated good  understanding of the education provided.     See EMR under Patient Instructions for exercises provided  2/5/2024.    Assessment     Patient continues to arrive without back pan as well as LLE pain. She is compliant with HEP and was able to wear her boot without pain which is something she hasn't been able to do lately. Added recovery of function today working on extension range of motion but always following up with repeated flexion to prevent onset of LLE symptoms. She did not complain of LLE throughout tx session. Planning for discharge on 3/25/2024 if she remains pain free with addition of extension into her home regimen. She has made great progress.    Patient is making good progress towards established goals.  Pt will continue to benefit from skilled outpatient physical therapy to address the deficits stated in the impairment chart, provide pt/family education and to maximize pt's level of independence in the home and community environment.     Anticipated Barriers for therapy: co morbidities   Pt's spiritual, cultural and educational needs considered and pt agreeable to plan of care and goals as stated below:     Goals:    Short term goals:  6 weeks or 10 visits   - Pt will demonstrate increased lumbar MedX ROM by at least 3 degrees from the initial ROM value with improvements noted in functional ROM and ability to perform ADLs. Appropriate and Ongoing  - Pt will demonstrate increased MedX average isometric strength value by 15% from initial test resulting in improved ability to perform bending, lifting, and carrying activities safely, confidently. Appropriate and Ongoing  - Pt will report a reduction in worst pain score by 1-2 points for improved tolerance for bending and sitting. Appropriate and Ongoing  - Pt able to perform HEP correctly with minimal cueing or supervision from therapist to encourage independent management of symptoms. Appropriate and Ongoing     Long term goals: 10 weeks or 20 visits   - Pt will demonstrate increased lumbar MedX ROM by at least 6 degrees from initial ROM value, resulting in  improved ability to perform functional forward bending while standing and sitting. Appropriate and Ongoing  - Pt will demonstrate increased MedX average isometric strength value by 30% from initial test resulting in improved ability to perform bending, lifting, and carrying activities safely and confidently. Appropriate and Ongoing  - Pt to demonstrate ability to independently control and reduce their pain through posture positioning and mechanical movements throughout a typical day. Appropriate and Ongoing  - Pt will demonstrate reduced pain and improved functional outcomes as reported on the FOTO by reaching an intake score of >/= 70% functional ability in order to demonstrate subjective improvement in patient's condition. . Appropriate and Ongoing  - Pt will demonstrate independence with the HEP at discharge. Appropriate and Ongoing  - Patient will report >/= 50% improvement since evaluation to improve functional mobility and quality of life Appropriate and Ongoing    Plan   Continue with established Plan of Care towards established PT goals.     La Whaley, PT   3/18/2024

## 2024-03-18 NOTE — PATIENT INSTRUCTIONS
"HEALTHY BACK TOOLS        KEEP YOUR SPINE FEELING FINE   HEALTHY HABITS   Do your "GO TO" stretches 2/day   Get a good night's REST   Watch your POSTURE in sitting/standing Drink PLENTY of water   Use a lumbar roll Eat LOTS of fruits & vegetables   GET UP often (walk and/or stretch) Manage your STRESS   Make your workplace IDEAL FOR YOU  Don't smoke   Lift correctly EXERCISE   Practice positive self talk-talk to yourself kindly like you would your best friend                         WHAT TO DO WHEN SYMPTOMS FLARE UP  Back and neck pain may occasionally flare up.  If you experience a flare   up, remember your tools. Be encouraged, by remembering that flare-ups will   usually pass.   My Tools:    ~Use your "Go To" Stretches/Positions   ~Keep Moving-pain usually gets better if you move  ~Z lie (with or without ice)  10 min several times a day until symptoms reduce  ~Slowly resume normal activities   ~Practice Deep Breathing and Relaxation techniques                                                 MY EXERCISE PLAN  GO TO STRETCHES  2/day (like brushing your teeth) STRENGTHENING  2-3 times/week CARDIO PROGRAM  120 min/week   Standing flexion  Bridges  Walking    Seated flexion  Open books  Cycling    Standing extension  Bird/dog    Prone press up  Prone alternating      Side steps      Table top toe taps     Pelvic tilts      Fire hydrants         "

## 2024-03-22 ENCOUNTER — CLINICAL SUPPORT (OUTPATIENT)
Dept: REHABILITATION | Facility: HOSPITAL | Age: 74
End: 2024-03-22
Payer: MEDICARE

## 2024-03-22 DIAGNOSIS — R29.3 POOR POSTURE: ICD-10-CM

## 2024-03-22 DIAGNOSIS — M53.86 DECREASED RANGE OF MOTION OF LUMBAR SPINE: ICD-10-CM

## 2024-03-22 DIAGNOSIS — R29.898 DECREASED STRENGTH OF LOWER EXTREMITY: Primary | ICD-10-CM

## 2024-03-22 PROCEDURE — 97112 NEUROMUSCULAR REEDUCATION: CPT | Mod: PN,CQ

## 2024-03-22 PROCEDURE — 97530 THERAPEUTIC ACTIVITIES: CPT | Mod: PN,CQ

## 2024-03-22 PROCEDURE — 97110 THERAPEUTIC EXERCISES: CPT | Mod: PN,CQ

## 2024-03-22 NOTE — PROGRESS NOTES
[facility]      Name: Aimee Palumbo  Clinic Number: [id number]    Therapy Diagnosis:   Encounter Diagnoses   Name Primary?    Decreased strength of lower extremity Yes    Poor posture     Decreased range of motion of lumbar spine      Physician: Andrés Hunt MD    Visit Date: 3/18/2024    Physician Orders: PT Eval and Treat  Medical Diagnosis from Referral:   Diagnosis   M54.42,G89.29 (ICD-10-CM) - Chronic bilateral low back pain with left-sided sciatica      Evaluation Date: 2024  Authorization Period Expiration: 2024  Plan of Care Expiration: 2024  Progress Note Due: 3/2/2024  Date of Surgery: none  Visit # / Visits authorized:   (healthy back )  FOTO: 3/ 3    PTA Visit #: 0 /5     Time In:  900 AM  Time Out:  AM ***  Total Billable Time: 60 minutes    INSURANCE and OUTCOMES: Fee for Service with FOTO Outcomes 2 /3    Precautions: standard and HTN, osteoporosis     Pattern of pain determined: 4 PEN ( responds better to flexion verse extension)     Subjective   Aimee Palumbo reports no pain upon arrival. She wore her boots yesterday without pain and no issues this morning either. She has had minimal to no pain in the LLE over the past few days to weeks.     Patient reports tolerating previous visit first tx session.   Patient reports their pain to be  0/10 on a 0-10 scale with 0 being no pain and 10 being the worst pain imaginable.  Pain Location: L sided low back into the LLE      Occupation: just retired   Hobbies: gabrielle, plays cards, go to the casino     Patients goals: to be strong enough to go on trip to [place]     Objective     Baseline IM Testing Results:   Date of testin2024  ROM 0-60 deg   Max Peak Torque 129    Min Peak Torque 18    Flex/Ext Ratio 7   % below normative data 35%     Outcomes:  Functional Score: 62%  Visit 6 Score: 69/100   Visit 11 Score: 67/100  Discharge Score:         Treatment    Aimee received the  treatments listed below:      Aimee received neuromuscular education for 8 minutes via participation on the [email or web address] Machine. Therapist assisted patient in isolating and engaging spinal stabilization musculature in order to improve functional ability and postural control. Patient performed exercise with therapist guidance in order to accurately use pacer function, avoid valsalva, and optimally exert effort within a safe and effective range via the Kaylee Exertion Rating Scale. Patient instructed to perform at a midrange of exertion and to complete 15-20 repetitions within appropriate split time, with proper technique, and while maintaining safety.         3/18/2024     9:48 AM   [facility]   Visit Number 14   VAS Pain Rating 0   Treadmill Time (in min.) 3 min   Speed 2.3 mph   Lumbar Weight 65 lbs   Repetitions 18   Rating of Perceived Exertion 4        Aimee participated in neuromuscular re-education activities to improve balance, coordination, proprioception, motor control and/or posture for 12 minutes. The following activities were included:    Bird/dog 2 x 5 repetitions each side  Table top toe taps 2 x 10 repetitions each side for core   Prone alternating arm and leg 2 x 5 repetitions each side     Not Performed:     Fire hydrants 3 x 10 reps each side red theraband   Abdominal crunch central 15 x 5 second  Posterior pelvic tilts 15 repetitions x 5 second hold   Posterior pelvic tilts march 10 repetitions each LE   Open books, with visual tracking of hand for increased range of motion,  tactile cues for proper form scapular leading x 10 repetitions each side  red theraband   Hip hinge to chair with dowel 2 x 10 reps   Sidelying external rotation clams green theraband x 20 repetitions each side    Aimee participated in therapeutic exercises to develop strength, endurance, ROM, flexibility, posture, and core stabilization for 20 minutes including:    Treadmill x 3 minutes 2.3 mph, gathering  subjective     Peripheral muscle strengthening which included one set of 15-20 repetitions at a slow and controlled 10-13 second per rep pace focused on strengthening supporting musculature in order to improve body mechanics and functional mobility. Patient and therapist focused on proper form during treatment to ensure optimal strengthening of each targeted muscle group.  Machines utilized include torso rotation, chest press, triceps extension, bicep curl, and upright row. Leg extension, leg curl, leg press, and hip adduction/abduction added visit 3.    Aimee participated in dynamic functional therapeutic activities to improve functional performance and simulate household and community activities for 20 minutes. The following activities were included:    Standing assessment: no pain in all directions, moderate extension limitation    Bridging x 20 repetitions (no pain)  Recovery of function:    Repeated standing extension 10 repetitions, no symptoms    Repeated seated flexion 10 repetitions    Prone press ups 10 repetitions, no symptoms   Standing repeated flexion 10 repetitions     Golfers lift 10 repetitions each side with unilateral UE support   Lateral stepping blue theraband at ankles 5 x 10 ft each way     Home Exercises Provided and Patient Education Provided    Home exercises include:     Standing repeated flexion with overpressure on legs - updated 3/1/2024    Cardio program (V5): -  Lifting education (V11): - 03-  Posture/ Using Lumbar Roll: educated, purchased   [employer] Discharge handout (date given): -  Equipment at home/gym membership: none     Education provided:   - PT role and Plan of Care    - Home exercise program     Written Home Exercises Provided: yes.  Exercises were reviewed and Aimee was able to demonstrate them prior to the end of the session. Aimee demonstrated good  understanding of the education provided.     See EMR under Patient Instructions for exercises provided  2/5/2024.    Assessment     Patient continues to arrive without back pan as well as LLE pain. She is compliant with HEP and was able to wear her boot without pain which is something she hasn't been able to do lately. Added recovery of function today working on extension range of motion but always following up with repeated flexion to prevent onset of LLE symptoms. She did not complain of LLE throughout tx session. Planning for discharge on 3/25/2024 if she remains pain free with addition of extension into her home regimen. She has made great progress.    Patient is making good progress towards established goals.  Pt will continue to benefit from skilled outpatient physical therapy to address the deficits stated in the impairment chart, provide pt/family education and to maximize pt's level of independence in the home and community environment.     Anticipated Barriers for therapy: co morbidities   Pt's spiritual, cultural and educational needs considered and pt agreeable to plan of care and goals as stated below:     Goals:    Short term goals:  6 weeks or 10 visits   - Pt will demonstrate increased lumbar MedX ROM by at least 3 degrees from the initial ROM value with improvements noted in functional ROM and ability to perform ADLs. Appropriate and Ongoing  - Pt will demonstrate increased MedX average isometric strength value by 15% from initial test resulting in improved ability to perform bending, lifting, and carrying activities safely, confidently. Appropriate and Ongoing  - Pt will report a reduction in worst pain score by 1-2 points for improved tolerance for bending and sitting. Appropriate and Ongoing  - Pt able to perform HEP correctly with minimal cueing or supervision from therapist to encourage independent management of symptoms. Appropriate and Ongoing     Long term goals: 10 weeks or 20 visits   - Pt will demonstrate increased lumbar MedX ROM by at least 6 degrees from initial ROM value, resulting in  improved ability to perform functional forward bending while standing and sitting. Appropriate and Ongoing  - Pt will demonstrate increased MedX average isometric strength value by 30% from initial test resulting in improved ability to perform bending, lifting, and carrying activities safely and confidently. Appropriate and Ongoing  - Pt to demonstrate ability to independently control and reduce their pain through posture positioning and mechanical movements throughout a typical day. Appropriate and Ongoing  - Pt will demonstrate reduced pain and improved functional outcomes as reported on the FOTO by reaching an intake score of >/= 70% functional ability in order to demonstrate subjective improvement in patient's condition. . Appropriate and Ongoing  - Pt will demonstrate independence with the HEP at discharge. Appropriate and Ongoing  - Patient will report >/= 50% improvement since evaluation to improve functional mobility and quality of life Appropriate and Ongoing    Plan   Continue with established Plan of Care towards established PT goals.     Shakira Waller, PT CLT  3/18/2024

## 2024-03-22 NOTE — PROGRESS NOTES
[facility]      Name: Aimee Palumbo  Clinic Number: [id number]    Therapy Diagnosis:   Encounter Diagnoses   Name Primary?    Decreased strength of lower extremity Yes    Poor posture     Decreased range of motion of lumbar spine      Physician: Andrés Hunt MD    Visit Date: 3/22/2024    Physician Orders: PT Eval and Treat  Medical Diagnosis from Referral:   Diagnosis   M54.42,G89.29 (ICD-10-CM) - Chronic bilateral low back pain with left-sided sciatica      Evaluation Date: 2024  Authorization Period Expiration: 2024  Plan of Care Expiration: 2024  Progress Note Due: 3/2/2024  Date of Surgery: none  Visit # / Visits authorized: 15 /20  (healthy back )  FOTO: 3/ 3    PTA Visit #:      Time In:  900 AM  Time Out: 0953 AM  Total Billable Time: 53 minutes    INSURANCE and OUTCOMES: Fee for Service with FOTO Outcomes 2 /3    Precautions: standard and HTN, osteoporosis     Pattern of pain determined: 4 PEN ( responds better to flexion verse extension)     Subjective   Aimee Palumbo reports    Patient reports tolerating previous visit first tx session.   Patient reports their pain to be  0/10 on a 0-10 scale with 0 being no pain and 10 being the worst pain imaginable.  Pain Location: L sided low back into the LLE      Occupation: just retired   Hobbies: gabrielle, plays [employer], go to the [employer]     Patients goals: to be strong enough to go on trip to [place]     Objective     Baseline IM Testing Results:   Date of testin2024  ROM 0-60 deg   Max Peak Torque 129    Min Peak Torque 18    Flex/Ext Ratio 7   % below normative data 35%     Outcomes:  Functional Score: 62%  Visit 6 Score: 69/100   Visit 11 Score: 67/100  Discharge Score:         Treatment    Aimee received the treatments listed below:      Aimee received neuromuscular education for 8 minutes via participation on the [employer] Machine. Therapist assisted patient in isolating  and engaging spinal stabilization musculature in order to improve functional ability and postural control. Patient performed exercise with therapist guidance in order to accurately use pacer function, avoid valsalva, and optimally exert effort within a safe and effective range via the Kaylee Exertion Rating Scale. Patient instructed to perform at a midrange of exertion and to complete 15-20 repetitions within appropriate split time, with proper technique, and while maintaining safety.            3/22/2024    10:19 AM   [facility]   Visit Number 15   VAS Pain Rating 0   Treadmill Time (in min.) 5 min   Speed 2.3 mph   Extension in Lying 10   Extension in Standing 20   Lumbar Weight 65 lbs   Repetitions 20   Rating of Perceived Exertion 3        Aimee participated in neuromuscular re-education activities to improve balance, coordination, proprioception, motor control and/or posture for 15 minutes. The following activities were included:    Bird/dog 2 x 5 repetitions each side  Table top toe taps 2 x 10 repetitions each side for core   Prone alternating arm and leg 2 x 5 repetitions each side     Not Performed:     Fire hydrants 3 x 10 reps each side red theraband   Abdominal crunch central 15 x 5 second  Posterior pelvic tilts 15 repetitions x 5 second hold   Posterior pelvic tilts march 10 repetitions each LE   Open books, with visual tracking of hand for increased range of motion,  tactile cues for proper form scapular leading x 10 repetitions each side  red theraband   Hip hinge to chair with dowel 2 x 10 reps   Sidelying external rotation clams green theraband x 20 repetitions each side    Aimee participated in therapeutic exercises to develop strength, endurance, ROM, flexibility, posture, and core stabilization for 15 minutes including:    Treadmill x 3 minutes 2.3 mph, gathering subjective   Peripheral muscle strengthening which included one set of 15-20 repetitions at a slow and controlled 10-13  second per rep pace focused on strengthening supporting musculature in order to improve body mechanics and functional mobility. Patient and therapist focused on proper form during treatment to ensure optimal strengthening of each targeted muscle group.  Machines utilized include torso rotation, chest press, triceps extension, bicep curl, and upright row. Leg extension, leg curl, leg press, and hip adduction/abduction added visit 3.    Aimee participated in dynamic functional therapeutic activities to improve functional performance and simulate household and community activities for 15 minutes. The following activities were included:    Standing assessment: no pain in all directions, moderate extension limitation    Bridging with Abduction x 20 repetitions red theraband     Recovery of function:    Repeated standing extension 10 repetitions, no symptoms    Repeated seated flexion 10 repetitions    Prone press ups 10 repetitions, no symptoms   Standing repeated flexion 10 repetitions     Golfers lift 10 repetitions each side with unilateral UE support   Lateral stepping blue theraband at ankles 5 x 10 ft each way     Home Exercises Provided and Patient Education Provided    Home exercises include:     Standing repeated flexion with overpressure on legs - updated 3/1/2024    Cardio program (V5): -  Lifting education (V11): - 03-  Posture/ Using Lumbar Roll: educated, purchased   [employer] Discharge handout (date given): -  Equipment at home/gym membership: none     Education provided:   - PT role and Plan of Care    - Home exercise program     Written Home Exercises Provided: yes.  Exercises were reviewed and Aimee was able to demonstrate them prior to the end of the session. Aimee demonstrated good  understanding of the education provided.     See EMR under Patient Instructions for exercises provided 2/5/2024.    Assessment     Patient presents to PT with no pain reported.  She has not reported pain  in several visits.  She is doing well with her exercises and her Home exercise program.  She will be discharged next visit.  She was able to do 20 reps on the MedX today.        Patient is making good progress towards established goals.  Pt will continue to benefit from skilled outpatient physical therapy to address the deficits stated in the impairment chart, provide pt/family education and to maximize pt's level of independence in the home and community environment.     Anticipated Barriers for therapy: co morbidities   Pt's spiritual, cultural and educational needs considered and pt agreeable to plan of care and goals as stated below:     Goals:    Short term goals:  6 weeks or 10 visits   - Pt will demonstrate increased lumbar MedX ROM by at least 3 degrees from the initial ROM value with improvements noted in functional ROM and ability to perform ADLs. Appropriate and Ongoing  - Pt will demonstrate increased MedX average isometric strength value by 15% from initial test resulting in improved ability to perform bending, lifting, and carrying activities safely, confidently. Appropriate and Ongoing  - Pt will report a reduction in worst pain score by 1-2 points for improved tolerance for bending and sitting. Appropriate and Ongoing  - Pt able to perform HEP correctly with minimal cueing or supervision from therapist to encourage independent management of symptoms. Appropriate and Ongoing     Long term goals: 10 weeks or 20 visits   - Pt will demonstrate increased lumbar MedX ROM by at least 6 degrees from initial ROM value, resulting in improved ability to perform functional forward bending while standing and sitting. Appropriate and Ongoing  - Pt will demonstrate increased MedX average isometric strength value by 30% from initial test resulting in improved ability to perform bending, lifting, and carrying activities safely and confidently. Appropriate and Ongoing  - Pt to demonstrate ability to independently  control and reduce their pain through posture positioning and mechanical movements throughout a typical day. Appropriate and Ongoing  - Pt will demonstrate reduced pain and improved functional outcomes as reported on the FOTO by reaching an intake score of >/= 70% functional ability in order to demonstrate subjective improvement in patient's condition. . Appropriate and Ongoing  - Pt will demonstrate independence with the HEP at discharge. Appropriate and Ongoing  - Patient will report >/= 50% improvement since evaluation to improve functional mobility and quality of life Appropriate and Ongoing    Plan   Continue with established Plan of Care towards established PT goals.     Wilma Ruiz, PTA   3/22/2024

## 2024-03-25 ENCOUNTER — CLINICAL SUPPORT (OUTPATIENT)
Dept: REHABILITATION | Facility: HOSPITAL | Age: 74
End: 2024-03-25
Payer: MEDICARE

## 2024-03-25 DIAGNOSIS — M53.86 DECREASED RANGE OF MOTION OF LUMBAR SPINE: ICD-10-CM

## 2024-03-25 DIAGNOSIS — R29.3 POOR POSTURE: ICD-10-CM

## 2024-03-25 DIAGNOSIS — R29.898 DECREASED STRENGTH OF LOWER EXTREMITY: Primary | ICD-10-CM

## 2024-03-25 PROCEDURE — 97530 THERAPEUTIC ACTIVITIES: CPT | Mod: PN

## 2024-03-25 PROCEDURE — 97110 THERAPEUTIC EXERCISES: CPT | Mod: PN

## 2024-03-25 PROCEDURE — 97112 NEUROMUSCULAR REEDUCATION: CPT | Mod: PN

## 2024-03-25 NOTE — PLAN OF CARE
[facility] Discharge Summary     Name: Aimee Palumbo  Clinic Number: [id number]    Therapy Diagnosis:   Encounter Diagnoses   Name Primary?    Decreased strength of lower extremity Yes    Poor posture     Decreased range of motion of lumbar spine      Physician: Andrés Hunt MD      Physician Orders: PT Eval and Treat  Medical Diagnosis from Referral:   Diagnosis   M54.42,G89.29 (ICD-10-CM) - Chronic bilateral low back pain with left-sided sciatica      Evaluation Date:     Date of Last visit: 3/25/2024  Total Visits Received: 16    Pattern of pain determined: 1 PEP    Subjective   Aimee Palumbo reports she is happy with the results. She has not been having her LLE pain in a while. She arrives without pain. She is able to carry out HEP without incident.      Patient reports tolerating previous visit first tx session.   Patient reports their pain to be  0/10 on a 0-10 scale with 0 being no pain and 10 being the worst pain imaginable.  Pain Location: L sided low back into the LLE      Occupation: just retired   Hobbies: [employer], [employer], go to the casino     Patients goals: to be strong enough to go on trip to [place]      Objective      Baseline IM Testing Results:   Date of testin2024  ROM 0-60 deg   Max Peak Torque 129    Min Peak Torque 18    Flex/Ext Ratio 7   % below normative data 35%      Date of testing: 3/25/2024  ROM 0-66 deg   Max Peak Torque 161   Min Peak Torque 38   Flex/Ext Ratio 4.2   % below normative data 24%   + 30% improvement since evaluation      Outcomes:  Functional Score: 62%  Visit 6 Score: 69/100   Visit 11 Score: 67/100  Discharge Score: 72/100         Assessment    Goals:       Short term goals:  6 weeks or 10 visits   - Pt will demonstrate increased lumbar MedX ROM by at least 3 degrees from the initial ROM value with improvements noted in functional ROM and ability to perform ADLs. MET 3/25/2024  - Pt will demonstrate increased MedX  average isometric strength value by 15% from initial test resulting in improved ability to perform bending, lifting, and carrying activities safely, confidently. MET 3/25/2024  - Pt will report a reduction in worst pain score by 1-2 points for improved tolerance for bending and sitting. MET 3/25/2024  - Pt able to perform HEP correctly with minimal cueing or supervision from therapist to encourage independent management of symptoms. MET 3/25/2024     Long term goals: 10 weeks or 20 visits   - Pt will demonstrate increased lumbar MedX ROM by at least 6 degrees from initial ROM value, resulting in improved ability to perform functional forward bending while standing and sitting. MET 3/25/2024  - Pt will demonstrate increased MedX average isometric strength value by 30% from initial test resulting in improved ability to perform bending, lifting, and carrying activities safely and confidently. MET 3/25/2024  - Pt to demonstrate ability to independently control and reduce their pain through posture positioning and mechanical movements throughout a typical day. MET 3/25/2024  - Pt will demonstrate reduced pain and improved functional outcomes as reported on the FOTO by reaching an intake score of >/= 70% functional ability in order to demonstrate subjective improvement in patient's condition. .MET 3/25/2024  - Pt will demonstrate independence with the HEP at discharge. MET 3/25/2024  - Patient will report >/= 50% improvement since evaluation to improve functional mobility and quality of life MET 3/25/2024    Patient has attended 16 visits of the [facility] program for aerobic work, med ex isometric testing with dynamic strengthening on med ex equipment for spine, whole body strengthening on med ex equipment, HEP, education.  Patient demonstrates improvement in ability to reduce symptoms, improved posture, improved ROM and improved strength.   Reviewed HEP, and importance of maintaining a healthy spine through continued  stretching and performance of HEP, good posture, good ergonomics, good body mech with ADL, leisure, and work.  Discharge handout with HEP given, and discussed aspects of exercise program, cardio, strengthening, and stretching.    Patient expressed understanding information given.      -Improved posture,   with using lumbar roll  -Improved 6 ROM,  initially 60 on med ex test and currently 66  -Improved strength on lumbar med ex IM test with 30% average improvement noted and reduced pain noted by patient  -Improved outcome measure, scoring 62 on initial and 72 at discharge indicating overall improved function and perception of abilities    Discharge reason: Patient is now asymptomatic, Patient has met all of his/her goals, and Patient has reached the maximum rehab potential for the present time    Plan   This patient is discharged from Physical Therapy    La Whaley, PT

## 2024-03-25 NOTE — PATIENT INSTRUCTIONS
Exercise Machine Guide    Back Extension  Instructions    Setup:  1) Select an appropriate weight.  2) Adjust the movement arm to a comfortable starting position.  3) Position your feet on the foot rests.  4) Rest your lower back against the back pad.  Exercise Action:  -Cross your arms in front of your chest.  -Extend back in a controlled motion while maintaining a neutral spine.  -Pause at full contraction.  -Slowly return to the start position.   Training Tips:  -Keep your lower back against the back pad.  -Avoid overextending.  -Beginners should start with a reduced range of motion.    Torso Rotation  Instructions    Setup:  1) Select an appropriate weight.  2) Sit with your back against the pads.  3) Select your starting position (left or right).  4) Place your elbows on the outside of the roller pads and grasp both handles.  5) Gently  the support pad with your knees.  Exercise Action:  -While bracing your upper body with your arms and hands, rotate in the desired direction using a slow, controlled motion.   -Pause at full contraction.  -Slowly return to the start position.  -Repeat the exercises in the other starting position.  Training Tips:  -Use a lighter weight and higher repetitions for this exercise.  -Maintain a loose  on the handles while training.   -Maintain a neutral spine and engage your abdominal muscles prior to each rotation.   Chest Press  Instructions    Setup:  1) Select an appropriate weight.  2) Adjust the seat so the handles are aligned with your mid-chest.  3) Press the foot assist to pull the handles forward into a comfortable starting position.   4) Grasp both handles and slowly release the foot assist. Place feet firmly on the floor.  Exercise Action:  -Extend your arms in a controlled motion.  -Slowly return to the start position.  -Use the foot assistant to return the handles to the rest position.  Training Tips:  -Avoid locking you elbows.  -To vary your training routine,  try both hand .   Seated Row  Instructions    Setup:  1) Select an appropriate weight.  2) Adjust the seat so the chest pad is slightly below shoulder level.  Exercise Action:  -Grasp both handles.   -Bend your elbows slightly prior to starting the movement.  -Pull the handles toward you in a controlled motion.  -Slowly return to the start position, maintaining a slight bend at the elbow between each repetition.  Training Tips:  -Keep your head in a neutral position and your chest firmly against the chest pad.   -Avoid elevating your shoulders while performing the movement.   Triceps Extension  Instructions    Setup:  1) Select an appropriate weight.  2) Adjust the seat height so your upper arms rest flat on the pad.  3) Align your elbows with the pivot.  Exercise Action:  -Grasp both handles.  -Extend your arms in a controlled motion.   -Pause at full extension.  -Slowly return to the start position.  Training Tips:  -Keep your upper arms flat on the pad.  -Maintain a slight bend in your elbow at the end of your range of motion.   Bicep Curl  Instructions    Setup:  1) Select an appropriate weight.  2) Adjust the seat height so your upper arms rest flat on the pad.  3) Align your elbows with the pivot.  Exercise Action:  -Grasp both handles.  -Bend your elbows slightly prior to starting the movement.  -Curl your arms up in a controlled motion.  -Slowly return to the start position, maintaining a slight bend at the elbow between each repetition.  Training Tips:  -Keep your upper arms flat on the pad and maintain a neutral spine at all times.   -To maintain even pacing for each repetition, count to two in each direction as you move.   Leg Press  Instructions    Setup:  1) Select as appropriate weight.   2) Sit and place your feet on the footplate approximately shoulder-width apart with your lower leg perpendicular to the footplate.   3) Raise the seat handle and position the seat so your knees are at a 90°  angle. Release the handle to lock your starting position prior to beginning the movement.  Exercise Action:  -Extend your legs in a controlled motion.  -Pause at full contraction.  -Slowly return to the start position.  Training Tips:  -Avoid locking your knees.   -Keep your back in contact with the pad at all times.   -Varying your foot position will change the training effect.   Leg Extension  Instructions    Setup:  1) Select an appropriate weight.  2) Align your knees with the pivot by adjusting the back pad.  3) Adjust the roller pad to a comfortable position atop your ankle.   4) Set movement arm to your desired start position.  Exercise Action:  -Grasp both handles.  -Extend your legs in a controlled motion.   -Pause at full extension.  -Slowly return to start position.  Training Tips:  -Avoid locking knees at full extension.  -Maintain contact with the back pad throughout your range of motion.   Leg Curl  Instructions    Setup:  1) Adjust the movement arm to a lowered position for easy equipment entry.   2) Select an appropriate weight.  3) Align your knees with the pivot by adjusting the back pad.  4) Adjust the ankle pad to a comfortable position.   1) Set the movement arm to your desired start position.  5) Make sure the tibia pad is positioned below your knee cap.   Exercise Action:  -Grasp both handles.  -Curl your legs in a controlled motion.  -Pause at full contraction.  -Slowly return to the start position.   Training Tips:  -Avoid locking knee in the starting position.  -Maintain contact with the back pad throughout the movement.   Hip Abduction  Instructions    Setup:  1) Select an appropriate weight.  2) Use the adjustment handle to set the movement arms to slightly open position.  3) Sit down with your outer thighs resting against the thigh pads.  4) Set the movement arms to position your legs together.   Exercise Action:  -Grasp both handles.  -Press thighs outward in a controlled  motion.  -Pause at full contraction.  -Slowly return to the start position.  Training Tips:  -You can ratchet the movement arms inward. Use the adjustment handles to move them outward.  -Avoid jerking motion throughout this exercise.  -Maintain contact with the back pad throughout the exercise.   Hip Adduction Instructions    Setup:  1) Select an appropriate weight.  2) Use the adjustment handle to close the movement arms.  3) Sit down with your inner thighs resting against the thigh pads.  4) Open the movement arms to your desired start position.   Exercise Action:  -Grasp both handles.  -Squeeze thighs inward in a controlled motion.   -Pause at full contraction.  -Slowly return to the start position.  Training Tips:  -Select a starting position that feels comfortable for your hips, yet provides the greatest possible range of motion.  -You can ratchet the movement arms outward. Use the adjustment handle to move them inward.

## 2024-03-25 NOTE — PROGRESS NOTES
[facility]      Name: Aimee Palumbo  Clinic Number: [id number]    Therapy Diagnosis:   Encounter Diagnoses   Name Primary?    Decreased strength of lower extremity Yes    Poor posture     Decreased range of motion of lumbar spine      Physician: Andrés Hunt MD    Visit Date: 3/25/2024    Physician Orders: PT Eval and Treat  Medical Diagnosis from Referral:   Diagnosis   M54.42,G89.29 (ICD-10-CM) - Chronic bilateral low back pain with left-sided sciatica      Evaluation Date: 2024  Authorization Period Expiration: 2024  Plan of Care Expiration: 2024  Progress Note Due: 3/2/2024  Date of Surgery: none  Visit # / Visits authorized:   ([facility] )  FOTO: 3/ 3    PTA Visit #:      Time In:  900 AM  Time Out: 1005 AM  Total Billable Time: 55 minutes    INSURANCE and OUTCOMES: Fee for Service with FOTO Outcomes 2 /3    Precautions: standard and HTN, osteoporosis     Pattern of pain determined: 4 PEN ( responds better to flexion verse extension)     Subjective   Aimee Palumbo reports she is happy with the results. She has not been having her LLE pain in a while. She arrives without pain. She is able to carry out HEP without incident.     Patient reports tolerating previous visit first tx session.   Patient reports their pain to be  0/10 on a 0-10 scale with 0 being no pain and 10 being the worst pain imaginable.  Pain Location: L sided low back into the LLE      Occupation: just retired   Hobbies: [employer], plays cards, go to the [employer]     Patients goals: to be strong enough to go on trip to [place]     Objective     Baseline IM Testing Results:   Date of testin2024  ROM 0-60 deg   Max Peak Torque 129    Min Peak Torque 18    Flex/Ext Ratio 7   % below normative data 35%     Date of testing: 3/25/2024  ROM 0-66 deg   Max Peak Torque 161   Min Peak Torque 38   Flex/Ext Ratio 4.2   % below normative data 24%   + 30% improvement since  evaluation     Outcomes:  Functional Score: 62%  Visit 6 Score: 69/100   Visit 11 Score: 67/100  Discharge Score: 72/100         Treatment    Aimee received the treatments listed below:      Physical Therapy technician assisted with treatment under direct supervision of treating therapist. Patient received 1:1 treatments for 55 minutes with Physical Therapist.     Aimee received neuromuscular education for 15 minutes via participation on the Outspark Machine. Therapist assisted patient in isolating and engaging spinal stabilization musculature in order to improve functional ability and postural control. Patient performed exercise with therapist guidance in order to accurately use pacer function, avoid valsalva, and optimally exert effort within a safe and effective range via the Kaylee Exertion Rating Scale. Patient instructed to perform at a midrange of exertion and to complete 15-20 repetitions within appropriate split time, with proper technique, and while maintaining safety.         3/25/2024    10:26 AM   [facility]   Visit Number 16   VAS Pain Rating 0   Treadmill Time (in min.) 3 min   Speed 2.5 mph   Lumbar Flexion 66   Lumbar Extension 0   Lumbar Peak Torque 161 ft. lbs.   Min Torque 38   Test Percent Below Normative Data 24 %   Test Percent Gain in Strength from Initial  30 %     Aimee participated in neuromuscular re-education activities to improve balance, coordination, proprioception, motor control and/or posture for 15 minutes. The following activities were included:    Bird/dog 2 x 5 repetitions each side  Table top toe taps 2 x 10 repetitions each side for core   Prone alternating arm and leg 2 x 5 repetitions each side   Sidelying ER clams green theraband 20 repetitions each side   Sidelying open books 10 repetitions each side     Aimee participated in therapeutic exercises to develop strength, endurance, ROM, flexibility, posture, and core stabilization for 25 minutes  including:    Treadmill x 3 minutes 2.3 mph, gathering subjective   Peripheral muscle strengthening which included one set of 15-20 repetitions at a slow and controlled 10-13 second per rep pace focused on strengthening supporting musculature in order to improve body mechanics and functional mobility. Patient and therapist focused on proper form during treatment to ensure optimal strengthening of each targeted muscle group.  Machines utilized include torso rotation, chest press, triceps extension, bicep curl, and upright row. Leg extension, leg curl, leg press, and hip adduction/abduction added visit 3.    Aimee participated in dynamic functional therapeutic activities to improve functional performance and simulate household and community activities for 10 minutes. The following activities were included:    Standing assessment: no pain in all directions, moderate extension limitation    Bridging 20 repetitions     Recovery of function:    Prone press ups 10 repetitions, followed by seated repeated flexion 10 repetitions with patient overpressure     Lateral stepping blue theraband at ankles 3 x 10 ft each way     Home Exercises Provided and Patient Education Provided    Home exercises include:     Standing repeated flexion with overpressure on legs - updated 3/1/2024    Cardio program (V5): -  Lifting education (V11): - 03-  Posture/ Using Lumbar Roll: educated, purchased   [employer] Discharge handout (date given): - 3/18/2024  Equipment at home/gym membership: none     Education provided:   - PT role and Plan of Care    - Home exercise program     Written Home Exercises Provided: yes.  Exercises were reviewed and Aimee was able to demonstrate them prior to the end of the session. Aimee demonstrated good  understanding of the education provided.     See EMR under Patient Instructions for exercises provided 2/5/2024.    Assessment     See discharge note for more details.     She tolerated tx session  well. Tx session focused on review of recovery of function and HEP. She has made great progress in therapy. She is compliant and independent with HEP. She has not had LLE nor back pain in a few weeks. She responds best to repeated flexion.      Patient is making good progress towards established goals.  Pt will continue to benefit from skilled outpatient physical therapy to address the deficits stated in the impairment chart, provide pt/family education and to maximize pt's level of independence in the home and community environment.     Anticipated Barriers for therapy: co morbidities   Pt's spiritual, cultural and educational needs considered and pt agreeable to plan of care and goals as stated below:     Goals:    Short term goals:  6 weeks or 10 visits   - Pt will demonstrate increased lumbar MedX ROM by at least 3 degrees from the initial ROM value with improvements noted in functional ROM and ability to perform ADLs. MET 3/25/2024  - Pt will demonstrate increased MedX average isometric strength value by 15% from initial test resulting in improved ability to perform bending, lifting, and carrying activities safely, confidently. MET 3/25/2024  - Pt will report a reduction in worst pain score by 1-2 points for improved tolerance for bending and sitting. MET 3/25/2024  - Pt able to perform HEP correctly with minimal cueing or supervision from therapist to encourage independent management of symptoms. MET 3/25/2024     Long term goals: 10 weeks or 20 visits   - Pt will demonstrate increased lumbar MedX ROM by at least 6 degrees from initial ROM value, resulting in improved ability to perform functional forward bending while standing and sitting. MET 3/25/2024  - Pt will demonstrate increased MedX average isometric strength value by 30% from initial test resulting in improved ability to perform bending, lifting, and carrying activities safely and confidently. MET 3/25/2024  - Pt to demonstrate ability to  independently control and reduce their pain through posture positioning and mechanical movements throughout a typical day. MET 3/25/2024  - Pt will demonstrate reduced pain and improved functional outcomes as reported on the FOTO by reaching an intake score of >/= 70% functional ability in order to demonstrate subjective improvement in patient's condition. .MET 3/25/2024  - Pt will demonstrate independence with the HEP at discharge. MET 3/25/2024  - Patient will report >/= 50% improvement since evaluation to improve functional mobility and quality of life MET 3/25/2024    Plan     See discharge note.     La Whaley, PT   3/25/2024

## 2024-10-11 ENCOUNTER — HOSPITAL ENCOUNTER (OUTPATIENT)
Dept: RADIOLOGY | Facility: HOSPITAL | Age: 74
Discharge: HOME | End: 2024-10-11
Attending: NURSE PRACTITIONER
Payer: MEDICARE

## 2024-10-11 DIAGNOSIS — M79.605 PAIN IN LEFT LEG: ICD-10-CM

## 2024-10-11 DIAGNOSIS — M79.605 PAIN IN LEFT LEG: Primary | ICD-10-CM

## 2024-10-11 PROCEDURE — 93971 EXTREMITY STUDY: CPT | Mod: 26,LT,, | Performed by: RADIOLOGY

## 2024-10-11 PROCEDURE — 93971 EXTREMITY STUDY: CPT | Mod: TC,LT

## 2024-11-06 ENCOUNTER — OFFICE VISIT (OUTPATIENT)
Dept: ORTHOPEDICS | Facility: CLINIC | Age: 74
End: 2024-11-06
Payer: MEDICARE

## 2024-11-06 ENCOUNTER — HOSPITAL ENCOUNTER (OUTPATIENT)
Dept: RADIOLOGY | Facility: HOSPITAL | Age: 74
Discharge: HOME | End: 2024-11-06
Attending: ORTHOPAEDIC SURGERY
Payer: MEDICARE

## 2024-11-06 VITALS — WEIGHT: 169 LBS | BODY MASS INDEX: 33.18 KG/M2 | HEIGHT: 60 IN

## 2024-11-06 DIAGNOSIS — M54.16 LUMBAR RADICULOPATHY: ICD-10-CM

## 2024-11-06 DIAGNOSIS — M51.369 DEGENERATION OF INTERVERTEBRAL DISC OF LUMBAR REGION WITHOUT DISCOGENIC BACK PAIN OR LOWER EXTREMITY PAIN: ICD-10-CM

## 2024-11-06 DIAGNOSIS — M54.32 SCIATICA, LEFT SIDE: Primary | ICD-10-CM

## 2024-11-06 DIAGNOSIS — M25.569 KNEE PAIN: Primary | ICD-10-CM

## 2024-11-06 PROCEDURE — 99214 OFFICE O/P EST MOD 30 MIN: CPT | Mod: PBBFAC,25,PN | Performed by: ORTHOPAEDIC SURGERY

## 2024-11-06 PROCEDURE — 99999 PR PBB SHADOW E&M-EST. PATIENT-LVL IV: CPT | Mod: PBBFAC,,, | Performed by: ORTHOPAEDIC SURGERY

## 2024-11-06 PROCEDURE — 72100 X-RAY EXAM L-S SPINE 2/3 VWS: CPT | Mod: 26,,, | Performed by: RADIOLOGY

## 2024-11-06 PROCEDURE — 72100 X-RAY EXAM L-S SPINE 2/3 VWS: CPT | Mod: TC,PN

## 2024-11-06 PROCEDURE — 99999PBSHW PR PBB SHADOW TECHNICAL ONLY FILED TO HB: Mod: PBBFAC,,,

## 2024-11-06 RX ORDER — ASPIRIN 325 MG
1 TABLET ORAL EVERY MORNING
COMMUNITY

## 2024-11-06 RX ORDER — TRIAMCINOLONE ACETONIDE 40 MG/ML
40 INJECTION, SUSPENSION INTRA-ARTICULAR; INTRAMUSCULAR
Status: COMPLETED | OUTPATIENT
Start: 2024-11-06 | End: 2024-11-06

## 2024-11-06 RX ORDER — ESCITALOPRAM OXALATE 20 MG/1
20 TABLET ORAL
COMMUNITY
Start: 2024-08-22 | End: 2024-11-20

## 2024-11-06 RX ORDER — METOPROLOL SUCCINATE 50 MG/1
1 TABLET, EXTENDED RELEASE ORAL DAILY
COMMUNITY
Start: 2023-11-30

## 2024-11-06 RX ORDER — MELOXICAM 15 MG/1
15 TABLET ORAL DAILY
Qty: 30 TABLET | Refills: 2 | Status: SHIPPED | OUTPATIENT
Start: 2024-11-06

## 2024-11-06 RX ORDER — LISINOPRIL AND HYDROCHLOROTHIAZIDE 20; 25 MG/1; MG/1
1 TABLET ORAL
COMMUNITY

## 2024-11-06 RX ADMIN — TRIAMCINOLONE ACETONIDE 40 MG: 40 INJECTION, SUSPENSION INTRA-ARTICULAR; INTRAMUSCULAR at 11:11

## 2024-11-06 NOTE — PROGRESS NOTES
Subjective:       Patient ID: Aimee Palumbo is a 74 y.o. female.    Chief Complaint: Pain of the Lumbar Spine (Lumbar pain to left side, pain down left leg to foot, no numbness or tingling, has venous insufficiency to left lower leg, limited standing, walking and bending, weakness to leg , pain varies and fluctuates, )      History of Present Illness    Prior to meeting with the patient I reviewed the medical chart in [facility]. This included reviewing the previous progress notes from our office, review of the patient's last appointment with their primary care provider, review of any visits to the emergency room, and review of any pain management appointments or procedures.   Ms. Yu comes to the office today as a new patient to Dr. Yee with a chief complaint of low back pain with left lower extremity radicular symptoms down to the level of the ankle that has been going on intermittently for quite some time, hip greater than a year.  She has gone to physical therapy before for this which was efficacious.  She has currently been taking Motrin 800 mg 3 times daily without much relief of her symptoms.  Denies any bowel or bladder incontinence.    Current Medications  Current Outpatient Medications   Medication Sig Dispense Refill    aspirin 325 MG tablet Take 1 tablet by mouth every morning.      EScitalopram oxalate (LEXAPRO) 20 MG tablet Take 20 mg by mouth.      ezetimibe (ZETIA) 10 mg tablet Take 10 mg by mouth once daily.      linaCLOtide (LINZESS) 145 mcg Cap capsule Take 1 capsule by mouth once daily.      lisinopriL-hydrochlorothiazide (PRINZIDE,ZESTORETIC) 20-25 mg Tab Take 1 tablet by mouth.      metoprolol succinate (TOPROL-XL) 50 MG 24 hr tablet Take 1 tablet by mouth once daily.      multivitamin (THERAGRAN) per tablet Take 1 tablet by mouth 2 (two) times a day.       nitroGLYCERIN (NITROSTAT) 0.4 MG SL tablet Place 0.4 mg under the tongue every 5 (five) minutes as needed for Chest pain.       pantoprazole (PROTONIX) 40 MG tablet TAKE 1 TABLET BY MOUTH EVERY DAY (Patient taking differently: Take 40 mg by mouth once daily.) 30 tablet 1    rosuvastatin (CRESTOR) 40 MG Tab Take 40 mg by mouth once daily.       meloxicam (MOBIC) 15 MG tablet Take 1 tablet (15 mg total) by mouth once daily. 30 tablet 2    metoprolol succinate (TOPROL-XL) 25 MG 24 hr tablet Take 1 tablet (25 mg total) by mouth 2 (two) times a day. 180 tablet 3     No current facility-administered medications for this visit.       Allergies  Review of patient's allergies indicates:  No Known Allergies    Past Medical History  Past Medical History:   Diagnosis Date    Anxiety     Constipation     Coronary artery disease     Esophageal stricture     GERD (gastroesophageal reflux disease)     indefinite PPI    Hyperlipidemia     Hypertension     Mitral valve prolapse     ARLETH on CPAP     Osteoporosis        Surgical History  Past Surgical History:   Procedure Laterality Date    APPENDECTOMY      BREAST BIOPSY      BREAST CYST ASPIRATION      BREAST SURGERY  12/2010    Reduction    cardiovascular stress testing  09/19/2017    CHOLECYSTECTOMY  12/13/2017    Laparoscopic- Dr Lai    CORONARY STENT PLACEMENT  2007    echocardiography  09/19/2017    ESOPHAGOGASTRODUODENOSCOPY      INTRAMEDULLARY RODDING OF FEMUR Right 6/5/2022    Procedure: INSERTION, INTRAMEDULLARY JOHAN, FEMUR;  Surgeon: Choco Cristobal MD;  Location: [facility];  Service: Orthopedics;  Laterality: Right;    TONSILLECTOMY      TOTAL REDUCTION MAMMOPLASTY      TUBAL LIGATION         Family History:   Family History   Problem Relation Name Age of Onset    Breast cancer Mother  77    Hyperlipidemia Mother      Hypertension Mother      Lung cancer Father      Hyperlipidemia Father      Diabetes Maternal Uncle      Lung cancer Paternal Uncle      Stroke Maternal Grandmother      Diabetes Brother      Hyperlipidemia Brother      Cancer Brother          lymphoma       Social History:    Social History     Socioeconomic History    Marital status:    Occupational History    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 Fatigue denies. Fever denies. Weight gain denies. Weight loss denies.    Respiratory:  Shortness of breath denies.    Cardiovascular:  Chest pain denies.    Gastrointestinal:  Constipation denies. Diarrhea denies. Nausea denies. Vomiting denies.     Hematology:  Easy bruising denies. Prolonged bleeding denies.     Genitourinary:  Frequent urination denies. Pain in lower back denies. Painful urination denies.     Musculoskeletal:  See HPI for details    Skin:  Rash denies.    Neurologic:  Dizziness denies. Gait abnormalities denies. Seizures denies. Tingling/Numbess denies.    Psychiatric:  Anxiety denies. Depressed mood denies.     Objective:   Vital Signs: There were no vitals filed for this visit.     Physical Exam      General Examination:     Constitutional: The patient is alert and oriented to lace person and time. Mood is pleasant.     Head/Face: Normal facial features normal eyebrows    Eyes: Normal extraocular motion bilaterally    Lungs: Respirations are equal and unlabored    Gait is coordinated.    Cardiovascular: There are no swelling or varicosities present.    Lymphatic: Negative for adenopathy    Skin: Normal    Neurological: Level of consciousness normal. Oriented to place person and time and situation    Psychiatric: Oriented to time place person and  situation    Lumbar exam: Skin throughout the lower back clean dry and intact.  No erythema or ecchymosis.  No signs or symptoms of infection.  She is neurovascularly intact throughout bilateral lower extremities.  Negative Homans bilaterally.  She can weightbear as tolerated on bilateral lower extremities.  She stands erect.  Negative straight leg raise maneuver bilaterally.  Nontender over bilateral greater trochanters.  Some mild diffuse tenderness about left-sided lumbar paravertebrals.    XRAY Report/ Interpretation :  Two views taken of the lumbar spine today: AP and lateral views.  No acute fractures or dislocations seen.  She has mild multilevel degenerative disc disease throughout the lumbar spine.  Sclerosis throughout the lower lumbar facet joints.    Assessment:       1. Sciatica, left side    2. Lumbar radiculopathy        Plan:       Aimee was seen today for pain.    Diagnoses and all orders for this visit:    Sciatica, left side  -     X-Ray Lumbar Spine Ap And Lateral    Lumbar radiculopathy    Other orders  -     meloxicam (MOBIC) 15 MG tablet; Take 1 tablet (15 mg total) by mouth once daily.         No follow-ups on file.  This is to attest that the medical assistant, Neo Hilario served in the capacity as a scribe for this patient's encounter.  This is also verify that I have reviewed the patient's history and  formulated the treatment plan for this patient.  I have  evaluated this patient  and formulated a treatment plan for this patient visit.  The treatment plan and medical decision-making is outlined below  Patient has lumbar facet arthropathy, lumbar degenerative disc disease, and lumbar radiculopathy into the left lower extremity.  She has had this problem for quite some time, greater than a year.  I will discontinue her Motrin 800 mg and transition her to Mobic 15 mg by mouth once a day with food.  Administered an intramuscular injection of 80 mg of Kenalog today.  We will obtain an MRI  of the lumbar spine to evaluate her facet joints and for any stenosis.  We will see her back with those results with the intent of likely referral to pain management for interventional procedures.    Treatment options were discussed with regards to the nature of the medical condition. Conservative pain intervention and surgical options were discussed in detail. The probability of success of each separate treatment option was discussed. The patient expressed a clear understanding of the treatment options. With regards to surgery, the procedure risk, benefits, complications, and outcomes were discussed. No guarantees were given with regards to surgical outcome.   The risk of complications, morbidity, and mortality of patient management decisions have been made at the time of this visit. These are associated with the patient's problems, diagnostic procedures and treatment options. This includes the possible management options selected and those considered but not selected by the patient after shared medical decision making we discussed with the patient.     This note was created using Dragon voice recognition software that occasionally misinterpreted phrases or words.

## 2024-11-10 ENCOUNTER — HOSPITAL ENCOUNTER (OUTPATIENT)
Dept: RADIOLOGY | Facility: HOSPITAL | Age: 74
Discharge: HOME | End: 2024-11-10
Attending: ORTHOPAEDIC SURGERY
Payer: MEDICARE

## 2024-11-10 DIAGNOSIS — M51.369 DEGENERATION OF INTERVERTEBRAL DISC OF LUMBAR REGION WITHOUT DISCOGENIC BACK PAIN OR LOWER EXTREMITY PAIN: ICD-10-CM

## 2024-11-10 DIAGNOSIS — M54.16 LUMBAR RADICULOPATHY: ICD-10-CM

## 2024-11-10 DIAGNOSIS — M54.32 SCIATICA, LEFT SIDE: ICD-10-CM

## 2024-11-10 PROCEDURE — 72148 MRI LUMBAR SPINE W/O DYE: CPT | Mod: 26,,, | Performed by: RADIOLOGY

## 2024-11-10 PROCEDURE — 72148 MRI LUMBAR SPINE W/O DYE: CPT | Mod: TC

## 2024-11-11 DIAGNOSIS — I87.2 VENOUS INSUFFICIENCY (CHRONIC) (PERIPHERAL): Primary | ICD-10-CM

## 2024-11-18 ENCOUNTER — CLINICAL SUPPORT (OUTPATIENT)
Dept: REHABILITATION | Facility: HOSPITAL | Age: 74
End: 2024-11-18
Payer: MEDICARE

## 2024-11-18 DIAGNOSIS — I89.0 LYMPHEDEMA OF BOTH LOWER EXTREMITIES: Primary | ICD-10-CM

## 2024-11-18 PROCEDURE — 97166 OT EVAL MOD COMPLEX 45 MIN: CPT | Mod: PO

## 2024-11-18 PROCEDURE — 97530 THERAPEUTIC ACTIVITIES: CPT | Mod: PO

## 2024-11-20 ENCOUNTER — CLINICAL SUPPORT (OUTPATIENT)
Dept: REHABILITATION | Facility: HOSPITAL | Age: 74
End: 2024-11-20
Payer: MEDICARE

## 2024-11-20 ENCOUNTER — OFFICE VISIT (OUTPATIENT)
Dept: ORTHOPEDICS | Facility: CLINIC | Age: 74
End: 2024-11-20
Payer: MEDICARE

## 2024-11-20 VITALS — WEIGHT: 169.06 LBS | BODY MASS INDEX: 33.19 KG/M2 | HEIGHT: 60 IN

## 2024-11-20 DIAGNOSIS — M47.816 FACET ARTHRITIS OF LUMBAR REGION: ICD-10-CM

## 2024-11-20 DIAGNOSIS — M54.16 LUMBAR RADICULOPATHY: ICD-10-CM

## 2024-11-20 DIAGNOSIS — M51.369 DEGENERATION OF INTERVERTEBRAL DISC OF LUMBAR REGION WITHOUT DISCOGENIC BACK PAIN OR LOWER EXTREMITY PAIN: ICD-10-CM

## 2024-11-20 DIAGNOSIS — M25.561 PAIN IN BOTH KNEES, UNSPECIFIED CHRONICITY: Primary | ICD-10-CM

## 2024-11-20 DIAGNOSIS — M25.562 PAIN IN BOTH KNEES, UNSPECIFIED CHRONICITY: Primary | ICD-10-CM

## 2024-11-20 DIAGNOSIS — M48.061 SPINAL STENOSIS OF LUMBAR REGION WITHOUT NEUROGENIC CLAUDICATION: Primary | ICD-10-CM

## 2024-11-20 DIAGNOSIS — M51.26 LUMBAR DISC HERNIATION: ICD-10-CM

## 2024-11-20 PROBLEM — I89.0 LYMPHEDEMA OF BOTH LOWER EXTREMITIES: Status: RESOLVED | Noted: 2024-11-20 | Resolved: 2024-11-20

## 2024-11-20 PROBLEM — M25.569 KNEE PAIN: Status: ACTIVE | Noted: 2024-11-20

## 2024-11-20 PROBLEM — I89.0 LYMPHEDEMA OF BOTH LOWER EXTREMITIES: Status: ACTIVE | Noted: 2024-11-20

## 2024-11-20 PROCEDURE — 99213 OFFICE O/P EST LOW 20 MIN: CPT | Mod: S$PBB,,, | Performed by: ORTHOPAEDIC SURGERY

## 2024-11-20 PROCEDURE — 97110 THERAPEUTIC EXERCISES: CPT | Mod: PO

## 2024-11-20 PROCEDURE — 99213 OFFICE O/P EST LOW 20 MIN: CPT | Mod: PBBFAC,PN | Performed by: ORTHOPAEDIC SURGERY

## 2024-11-20 PROCEDURE — 97161 PT EVAL LOW COMPLEX 20 MIN: CPT | Mod: PO

## 2024-11-20 PROCEDURE — 99999 PR PBB SHADOW E&M-EST. PATIENT-LVL III: CPT | Mod: PBBFAC,,, | Performed by: ORTHOPAEDIC SURGERY

## 2024-11-20 RX ORDER — HYDROCORTISONE ACETATE 25 MG/1
SUPPOSITORY RECTAL
COMMUNITY

## 2024-11-20 NOTE — PLAN OF CARE
[facility]  Physical Therapy Neurological Rehabilitation Initial Evaluation     Name: Aimee Palumbo  Clinic Number: [id number]    Therapy Diagnosis:   Encounter Diagnosis   Name Primary?    Pain in both knees, unspecified chronicity Yes     Physician: Dillan Barrera MD    Physician Orders: PT Eval and Treat   Medical Diagnosis from Referral: M25.569  Knee pain  Evaluation Date: 11/20/2024  Authorization Period Expiration: 1/14/25  Plan of Care Expiration: 10/31/25    Visit # / Visits authorized: 1/ 1  FOTO: 64/100    Precautions: Standard    Time In: 0745  Time Out: 0820  Total Billable Time: 35 minutes    Subjective      Date of onset: 11/6/24    History of current condition - Aimee reports: having on and off pain to the kneesfor years Left > Right . Had joint injection 2 weeks ago and had been better. Hx of venous insufficiency, arthritis     Imaging, MRI studies: Lumbar spine 11/10/24  At T10-T11, T11-T12, mild loss of disc space height without central canal or neural foramen narrowing seen on sagittal sequences.   At T12-L1, minor loss of disc space height and minor annular bulge cause no narrowing.   At L1-L2, L2-L3, minor disc desiccation without narrowing.   At L3-L4, circumferential disc bulge and mild bilateral facet joint osteoarthrosis combine with ligamentum flavum buckling to result in mild central canal narrowing without neural foramen narrowing.   At L4-L5, midline disc protrusion is superimposed on circumferential disc bulge resulting in moderate to severe central canal narrowing and severe left and moderate right lateral recess narrowing.  Left L5 nerve roots are compressed in the lateral recess with contact of right L5 nerve roots.  No neural foramen narrowing.   At L5-S1, mild loss of disc space height and annular bulge cause no significant narrowing.   Lumbosacral alignment is normal.  Small joint effusion affects left L3-L4 facet joint with associated increased  "T2 and low T1 bone marrow signal alteration affecting left inferior L3 and superior L4 reticulated facets suggesting reactive bone marrow edema.  Bone marrow signal is otherwise normal.  Conus terminates normally at L1-L2.  Visualized paraspinal soft tissues are unremarkable.       Prior Therapy: back program.  Social History: lives alone   Falls: none recent   DME: Walker and Straight cane    Home Environment: trailer home with 4 steps. Tub shower.   Exercise Routine / History: none , walk the dog.  Occupation: retired caregiver  Prior Level of Function: Independent  Current Level of Function: difficult going up steps; avoid standing for a long time; hurts when turning and walking long periods.    Pain:  Current 0/10, worst 10/10, best 0/10   Location: left knee   Description: Throbbing and Deep  Aggravating Factors: Standing, Walking, and Getting out of bed/chair  Easing Factors: pain medication and joint injection    Patient's goals: "to prevent surgery."    Medical History:   Past Medical History:   Diagnosis Date    Anxiety     Constipation     Coronary artery disease     Esophageal stricture     GERD (gastroesophageal reflux disease)     indefinite PPI    Hyperlipidemia     Hypertension     Mitral valve prolapse     ARLETH on CPAP     Osteoporosis        Surgical History:   Aimee Palumbo  has a past surgical history that includes Appendectomy; Esophagogastroduodenoscopy; cardiovascular stress testing (09/19/2017); echocardiography (09/19/2017); Tonsillectomy; Tubal ligation; Breast surgery (12/2010); Cholecystectomy (12/13/2017); Coronary stent placement (2007); Breast biopsy; Breast cyst aspiration; Total Reduction Mammoplasty; and Intramedullary rodding of femur (Right, 6/5/2022).    Medications:   Aimee has a current medication list which includes the following prescription(s): aspirin, escitalopram oxalate, ezetimibe, linaclotide, lisinopril-hydrochlorothiazide, meloxicam, metoprolol succinate, " metoprolol succinate, multivitamin, nitroglycerin, pantoprazole, and rosuvastatin.    Allergies:   Review of patient's allergies indicates:  No Known Allergies     Objective      Posture: assumes erect posture  Palpation: no significant lymphedema or swelling; no tenderness  Sensation: intact; distant memory of throbbing to the left lateral calf.  Range of Motion/Strength: BUE's WFL  BLE's: 4/5 gross strength  Knee flexion-extension: WFL  Both ankle dorsiflexion 5 deg     Flexibility: SLR 85 deg bilat  Gait: Without AD  Analysis: no gait deviation noted.  Bed Mobility:Independent  Transfers: Independent  Special Tests: 30 second sit to stand: 12  Single leg stance R 7 sec; L 7 sec  Anterior Drawer's (-) bilat  Varus/valgus stress (-) bilat    Intake Outcome Measure for FOTO knee  Survey    Therapist reviewed FOTO scores for Aimee Palumbo on 11/20/2024.   FOTO report - see Media section or FOTO account episode details.    Intake Score: 64%       Treatment     Total Treatment time separate from Evaluation: 10 minutes    Aimee received the treatments listed below:      therapeutic exercises to develop strength and establish home program for 10 minutes including:  Bridging  SLR  Quad sets  Hamstring curls    Patient Education and Home Exercises     Education provided:   - Discussed Plan of care; Home exercise instructions    Written Home Exercises Provided: Yes.  Exercises were reviewed and Aimee was able to demonstrate them prior to the end of the session.  Aimee demonstrated good  understanding of the education provided.     Assessment     Aimee is a 74 y.o. female referred to outpatient Physical Therapy with a medical diagnosis of Knee pains. Patient presents with no present pain, no instability or joint limitation, apprehension with recurring pain.    Patient prognosis is Good.   Patient will benefit from skilled outpatient Physical Therapy to address the deficits stated above and in the chart below,  provide patient /family education, and to maximize patient's level of independence.     Plan of care discussed with patient: Yes  Patient's spiritual, cultural and educational needs considered and patient is agreeable to the plan of care and goals as stated below:     Anticipated Barriers for therapy: none    Medical Necessity is demonstrated by the following  History  Co-morbidities and personal factors that may impact the plan of care [x] LOW: no personal factors / co-morbidities  [] MODERATE: 1-2 personal factors / co-morbidities  [] HIGH: 3+ personal factors / co-morbidities    Moderate / High Support Documentation:   Co-morbidities affecting plan of care: anxiety, HTN    Personal Factors:   age     Examination  Body Structures and Functions, activity limitations and participation restrictions that may impact the plan of care [x] LOW: addressing 1-2 elements  [] MODERATE: 3+ elements  [] HIGH: 4+ elements (please support below)    Moderate / High Support Documentation: no deficits     Clinical Presentation [x] LOW: stable  [] MODERATE: Evolving  [] HIGH: Unstable     Decision Making/ Complexity Score: low       Goals:  Short Term Goals: 2 weeks   Patient will report compliance with home exercise given.  Patient will demonstrate alternate steps ascending/descending stairs using one hand rail.    Long Term Goals: 6 weeks   Pain-free functional mobility 0/10 100% of the time.  Patient will demonstrate 10' on treadmill 1.3 mph  Improve FOTO score 68/100.  Plan     Plan of care Certification: 11/20/2024 to 1/14/25.    Outpatient Physical Therapy 2 times weekly for 6 weeks to include the following interventions: Electrical Stimulation  , Gait Training, Manual Therapy, Moist Heat/ Ice, Neuromuscular Re-ed, Therapeutic Activities, Therapeutic Exercise, and Ultrasound.     Eder Mejia PT        Physician's Signature: _________________________________________ Date: ________________

## 2024-11-20 NOTE — PATIENT INSTRUCTIONS
Quad Sets        Squeeze pelvic floor and hold. Tighten top of left thigh. Hold for ___ seconds. Relax for ___ seconds. Repeat ___ times. Do ___ times a day. Repeat with other leg.        Copyright © [employer]. All rights reserved.      Bridging        Lying supine with knees bent, tighten stomach muscles. Raise hips off floor, tighten buttocks. Hold ___ seconds.  Repeat ___ times. Do ___ times per day.    Copyright © [employer]. All rights reserved.      HIP: Flexion / KNEE: Extension, Straight Leg Raise        Raise leg, keeping knee straight. Perform slowly. ___ reps per set, ___ sets per day, ___ days per week      Copyright © [employer]. All rights reserved.      FLEXION: Sitting - Resistance Band (Active)        Sit with right leg extended. Against yellow resistance band, bend knee and draw foot backward.  Complete ___ sets of ___ repetitions. Perform ___ sessions per day.

## 2024-11-20 NOTE — PLAN OF CARE
[facility]  Occupational Therapy Initial Evaluation    Date: 11/18/2024  Name: Aimee Palumbo  Clinic Number: [id number]    Therapy Diagnosis:   Encounter Diagnosis   Name Primary?    Lymphedema of both lower extremities Yes     Physician: Dillan Barrera MD    Physician Orders: evaluate and treat  Medical Diagnosis: venous insufficiency  Surgical Procedure and Date: 2 years ago: ablation of vein left leg,   Evaluation Date: 11/18/2024  Insurance Authorization Period Expiration: 12/31/2024  Plan of Care Certification Period: 12/31/2024  Progress Note Due: 12/31/2024   Date of Return to MD: to be scheduled  Visit # / Visits authorized: 1 / 1  FOTO: intake completed/    Precautions:  Standard and Fall    Time In:0700  Time Out: 0800  Total Appointment Time (timed & untimed codes): 60 minutes    SUBJECTIVE     Date of Onset: 2 years ago    History of Current Condition/Mechanism of Injury: Aimee Palumbo is a 74 y.o. female who presents to [facility] for evaluation secondary to lymphedema. Patient was referred to therapy by Dillan Barrera MD , which is the patient's orthopedic surgeon. Patient reports that swelling started about 2 years ago, has worsened in past year,. Patient was accompanied to the evaluation by self.     Falls: no    Involved Side: bilateral with left much more affected  Prior Therapy: no  Occupation/Working presently: retired  Duties: na    Functional Limitations/Social History:    Previous and current functional status includes: Independent with all ADLs.     Current Functional Status   Home/Living environment: lives alone      No Limitation of Functional Status as follows:   Does not drive  Pain:  Functional Pain Scale Rating 0-10: Current 2/10, worst 3/10, best 1/10   Location: left leg, sciatica  Description: Burning, Grabbing, and Tight  Aggravating Factors: Standing, Bending, and Getting out of  "bed/chair  Easing Factors: lying down    Patient's Goals for Therapy: decreased pain, reduction of leg    Medical History:   Past Medical History:   Diagnosis Date    Anxiety     Constipation     Coronary artery disease     Esophageal stricture     GERD (gastroesophageal reflux disease)     indefinite PPI    Hyperlipidemia     Hypertension     Mitral valve prolapse     ARLETH on CPAP     Osteoporosis        Surgical History:    has a past surgical history that includes Appendectomy; Esophagogastroduodenoscopy; cardiovascular stress testing (09/19/2017); echocardiography (09/19/2017); Tonsillectomy; Tubal ligation; Breast surgery (12/2010); Cholecystectomy (12/13/2017); Coronary stent placement (2007); Breast biopsy; Breast cyst aspiration; Total Reduction Mammoplasty; and Intramedullary rodding of femur (Right, 6/5/2022).    Medications:   has a current medication list which includes the following prescription(s): aspirin, escitalopram oxalate, ezetimibe, hydrocortisone, linaclotide, lisinopril-hydrochlorothiazide, meloxicam, metoprolol succinate, metoprolol succinate, multivitamin, nitroglycerin, pantoprazole, and rosuvastatin.    Allergies:   Review of patient's allergies indicates:  No Known Allergies       OBJECTIVE   Ambulated to treatment room. Reliable historian. Lives alone, retired and independent.   Bilateral lower legs present with lymphedema . Left more affected. Legs are normal temperature with discoloration typical with venous insufficiency. No wounds, blisters or deformity. Non pitting with increased density. Range of motion is impaired at left knee and ankle. Aimee endorses 1 occurrence of cellulitis.   Girth Measurements (in centimeters)  LANDMARK LEFT Leg RIGHT Leg    forefoot 22 cm 22 cm    ankle  22 cm 22 cm    4" 28 cm 29 cm    calf 38 cm 36.5 cm    Below knee 38 cm 38 cm    Above knee 47 cm 44 cm       Treatment   Total Treatment time (time-based codes) separate from Evaluation: 15 " minutes    Aimee received the treatments listed below:     Therapeutic activities to improve functional performance for 15  minutes, including:  Expanded education in regards to home management of her lymphedema. Demonstrated different compression options.  Patient Education and Home Exercises    Education provided:   1. Educated on definition of lymphedema.  2. Explained the Complete Decongestive Therapy protocol in depth  3. Educated on Phase 1 and 2 of protocol.  4. Reviewed treatment frequency and likely duration of weeks  5.Contraindications for treatment.  6. Plan of care and goals.  7. Educated on home management protocols.     Written Home Exercises Provided:  no .    Patient/Family Education: role of OT, goals for OT, scheduling/cancellations - pt verbalized understanding. Discussed insurance limitations with patient.      ASSESSMENT     Aimee Palumbo is a 74 y.o. female referred to outpatient occupational therapy and presents with a medical diagnosis of lymphedema.  Lymphedema, left untreated increases risk of infection, gait deviation causing ortho problems and poor body image. Patient presents with the following therapy deficits: lymphedema of bilateral lower limbs and demonstrates limitations as described in the chart below. Following medical record review it is determined that pt may benefit from complete decongestive therapy services for the treatment and management of this chronic condition. .     Anticipated barriers to occupational therapy: none  Pt has no cultural, educational or language barriers to learning provided.    Profile and History Assessment of Occupational Performance Level of Clinical Decision Making Complexity Score   Occupational Profile:   Aimee Palumbo is a 74 y.o. female who lives alone and is retired Aimee Palumbo has difficulty with  ADLs and IADLs as listed previously, which  Affecting herdaily functional abilities.      Comorbidities:    has a past medical  history of Anxiety, Constipation, Coronary artery disease, Esophageal stricture, GERD (gastroesophageal reflux disease), Hyperlipidemia, Hypertension, Mitral valve prolapse, ARLETH on CPAP, and Osteoporosis.    Medical and Therapy History Review:   Expanded               Performance Deficits    Physical:  Joint Mobility  Skin Integrity/Scar Formation  Edema    Cognitive:  No Deficits    Psychosocial:    No Deficits     Clinical Decision Making:  moderate    Assessment Process:  Detailed Assessments    Modification/Need for Assistance:  Not Necessary    Intervention Selection:  Limited Treatment Options       moderate  Based on PMHX, co morbidities , data from assessments and functional level of assistance required with task and clinical presentation directly impacting function.       The following goals were discussed with the patient and patient is in agreement with them as to be addressed in the treatment plan.     Goals:   Short Term Goals for 6 weeks: (phase 1 of protocol)  Complete decongestion B LE-   Aimee will be educated on lymphedema precautions and signs of infection. - Ongoing 11/18/2024   Aimee will perform deep abdominal breathing TID-     Aimee will tolerate daily activities with multilayered bandaging.-   Appropriate compression garments to be ordered/delivered-      Long Term Goals for 10 weeks: (Phase 2 of goals)  Aimee will be independent with home management of this chronic condition.  Aimee to kristine/doff compression garment.    Aimee will demonstrate compliance with all home management recommendations.  Patient will maintain reduction at monthly follow up appointments for 3 months     PLAN   Plan of Care Certification: 11/18/2024 to 12/31/2024.     Outpatient Occupational Therapy 2 times weekly for 10 weeks to include the following interventions: Complete Decongestive Therapy  .      JOSE Lopez, LIZBETH/VAN BANKS CERTIFY THE NEED FOR THESE SERVICES FURNISHED UNDER THIS PLAN OF  TREATMENT AND WHILE UNDER MY CARE  Physician's comments:      Physician's Signature: ___________________________________________________

## 2024-11-20 NOTE — PROGRESS NOTES
Subjective:       Patient ID: Aimee Palumbo is a 74 y.o. female.    Chief Complaint: Pain of the Lumbar Spine (Patient is here for lumbar MRI results, states pain has improved since last visit. Painful and limited ROM )      History of Present Illness    Prior to meeting with the patient I reviewed the medical chart in [facility]. This included reviewing the previous progress notes from our office, review of the patient's last appointment with their primary care provider, review of any visits to the emergency room, and review of any pain management appointments or procedures.   Follow-up back and left leg symptoms patient notes sciatica a left lower extremity.  She has back pain when being leaning forward such as weed eating he discuss results of the MRI presently in therapy.    Current Medications  Current Outpatient Medications   Medication Sig Dispense Refill    aspirin 325 MG tablet Take 1 tablet by mouth every morning.      EScitalopram oxalate (LEXAPRO) 20 MG tablet Take 20 mg by mouth.      ezetimibe (ZETIA) 10 mg tablet Take 10 mg by mouth once daily.      hydrocortisone (ANUCORT-HC) 25 mg suppository INSERT 1 RECTALLY PRN      linaCLOtide (LINZESS) 145 mcg Cap capsule Take 1 capsule by mouth once daily.      lisinopriL-hydrochlorothiazide (PRINZIDE,ZESTORETIC) 20-25 mg Tab Take 1 tablet by mouth.      meloxicam (MOBIC) 15 MG tablet Take 1 tablet (15 mg total) by mouth once daily. 30 tablet 2    metoprolol succinate (TOPROL-XL) 50 MG 24 hr tablet Take 1 tablet by mouth once daily.      multivitamin (THERAGRAN) per tablet Take 1 tablet by mouth 2 (two) times a day.       nitroGLYCERIN (NITROSTAT) 0.4 MG SL tablet Place 0.4 mg under the tongue every 5 (five) minutes as needed for Chest pain.      pantoprazole (PROTONIX) 40 MG tablet TAKE 1 TABLET BY MOUTH EVERY DAY (Patient taking differently: Take 40 mg by mouth once daily.) 30 tablet 1    rosuvastatin (CRESTOR) 40 MG Tab Take 40 mg by mouth once daily.        metoprolol succinate (TOPROL-XL) 25 MG 24 hr tablet Take 1 tablet (25 mg total) by mouth 2 (two) times a day. 180 tablet 3     No current facility-administered medications for this visit.       Allergies  Review of patient's allergies indicates:  No Known Allergies    Past Medical History  Past Medical History:   Diagnosis Date    Anxiety     Constipation     Coronary artery disease     Esophageal stricture     GERD (gastroesophageal reflux disease)     indefinite PPI    Hyperlipidemia     Hypertension     Mitral valve prolapse     ARLETH on CPAP     Osteoporosis        Surgical History  Past Surgical History:   Procedure Laterality Date    APPENDECTOMY      BREAST BIOPSY      BREAST CYST ASPIRATION      BREAST SURGERY  12/2010    Reduction    cardiovascular stress testing  09/19/2017    CHOLECYSTECTOMY  12/13/2017    Laparoscopic- Dr Lai    CORONARY STENT PLACEMENT  2007    echocardiography  09/19/2017    ESOPHAGOGASTRODUODENOSCOPY      INTRAMEDULLARY RODDING OF FEMUR Right 6/5/2022    Procedure: INSERTION, INTRAMEDULLARY JOHAN, FEMUR;  Surgeon: Choco Cristobal MD;  Location: [facility];  Service: Orthopedics;  Laterality: Right;    TONSILLECTOMY      TOTAL REDUCTION MAMMOPLASTY      TUBAL LIGATION         Family History:   Family History   Problem Relation Name Age of Onset    Breast cancer Mother  77    Hyperlipidemia Mother      Hypertension Mother      Lung cancer Father      Hyperlipidemia Father      Diabetes Maternal Uncle      Lung cancer Paternal Uncle      Stroke Maternal Grandmother      Diabetes Brother      Hyperlipidemia Brother      Cancer Brother          lymphoma       Social History:   Social History     Socioeconomic History    Marital status:    Occupational History    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 Fatigue denies. Fever denies. Weight gain denies. Weight loss denies.    Respiratory:  Shortness of breath denies.    Cardiovascular:  Chest pain denies.    Gastrointestinal:  Constipation denies. Diarrhea denies. Nausea denies. Vomiting denies.     Hematology:  Easy bruising denies. Prolonged bleeding denies.     Genitourinary:  Frequent urination denies. Pain in lower back denies. Painful urination denies.     Musculoskeletal:  See HPI for details    Skin:  Rash denies.    Neurologic:  Dizziness denies. Gait abnormalities denies. Seizures denies. Tingling/Numbess denies.    Psychiatric:  Anxiety denies. Depressed mood denies.     Objective:   Vital Signs: There were no vitals filed for this visit.     Physical Exam      General Examination:     Constitutional: The patient is alert and oriented to lace person and time. Mood is pleasant.     Head/Face: Normal facial features normal eyebrows    Eyes: Normal extraocular motion bilaterally    Lungs: Respirations are equal and unlabored    Gait is coordinated.    Cardiovascular: There are no swelling or varicosities present.    Lymphatic: Negative for adenopathy    Skin: Normal    Neurological: Level of consciousness normal. Oriented to place person and time and situation    Psychiatric: Oriented to time place person and situation    Straight-leg-raising positive on the left side lumbar range of motion normal with endpoint pain motor exam grossly intact both lower extremities  XRAY Report/ Interpretation:  MRI personally reviewed central and left paracentral moderate disc protrusion at L4-5 noted please see report for  details      Yan Yee MD on 11/11/2024 09:41     MRI Lumbar Spine Without Contrast  Order: [id number]  Status: Final result       Visible to patient: Yes (seen)       Next appt: 11/25/2024 at 07:00 AM in Outpatient Rehab (Lolis Jaquez PTA)       Dx: Lumbar radiculopathy; Sciatica, left ...    0 Result Notes  Details    Reading Physician Reading Date Result Priority   Desmond Jones MD  [phone] 11/11/2024 Routine     Narrative & Impression  EXAMINATION:  MRI LUMBAR SPINE WITHOUT CONTRAST     CLINICAL HISTORY:  Lumbar Radiculopathy; Sciatica, left side     TECHNIQUE:  MRI SPINE (LUMBAR) without IV contrast     COMPARISON:  11/06/2024 radiographs     FINDINGS:  At T10-T11, T11-T12, mild loss of disc space height without central canal or neural foramen narrowing seen on sagittal sequences.     At T12-L1, minor loss of disc space height and minor annular bulge cause no narrowing.     At L1-L2, L2-L3, minor disc desiccation without narrowing.     At L3-L4, circumferential disc bulge and mild bilateral facet joint osteoarthrosis combine with ligamentum flavum buckling to result in mild central canal narrowing without neural foramen narrowing.     At L4-L5, midline disc protrusion is superimposed on circumferential disc bulge resulting in moderate to severe central canal narrowing and severe left and moderate right lateral recess narrowing.  Left L5 nerve roots are compressed in the lateral recess with contact of right L5 nerve roots.  No neural foramen narrowing.     At L5-S1, mild loss of disc space height and annular bulge cause no significant narrowing.     Lumbosacral alignment is normal.  Small joint effusion affects left L3-L4 facet joint with associated increased T2 and low T1 bone marrow signal alteration affecting left inferior L3 and superior L4 reticulated facets suggesting reactive bone marrow edema.  Bone marrow signal is otherwise normal.  Conus terminates normally at L1-L2.  Visualized  paraspinal soft tissues are unremarkable.     Impression:     1. Midline L4-L5 disc protrusion results in moderate to severe central canal narrowing with severe left and moderate right lateral recess narrowing.  Correlation for left greater than right L5 radiculopathies is requested.  2. Small left L3-L4 facet joint effusion with associated adjacent bone marrow edema suggesting mild active inflammation of facet joint osteoarthritis.  3. Additional multilevel lumbar mild disc degeneration as described.        Electronically signed by:Desmond Jones  Date:                                            11/11/2024  Time:                                           08:01        Exam Ended: 11/10/24 16:13 CST Last Resulted: 11/11/24 08:01 CST       Order Details        View Encounter     Assessment:       1. Spinal stenosis of lumbar region without neurogenic claudication    2. Lumbar radiculopathy    3. Lumbar disc herniation    4. Facet arthritis of lumbar region    5. Degeneration of intervertebral disc of lumbar region without discogenic back pain or lower extremity pain        Plan:       Aimee was seen today for pain.    Diagnoses and all orders for this visit:    Spinal stenosis of lumbar region without neurogenic claudication    Lumbar radiculopathy    Lumbar disc herniation    Facet arthritis of lumbar region    Degeneration of intervertebral disc of lumbar region without discogenic back pain or lower extremity pain         Follow up for 3 month f/u - lumbar pain/radiculopathy.    Treatment options discussed she does feel symptoms are severe enough to be referred to pain management for an interlaminar L4-5 epidural steroid injection.  She wished to be observed at this time.  She will consider epidural steroid injection if symptoms change or worsen.    Treatment options were discussed with regards to the nature of the medical condition. Conservative pain intervention and surgical options were discussed in detail. The  probability of success of each separate treatment option was discussed. The patient expressed a clear understanding of the treatment options. With regards to surgery, the procedure risk, benefits, complications, and outcomes were discussed. No guarantees were given with regards to surgical outcome.   The risk of complications, morbidity, and mortality of patient management decisions have been made at the time of this visit. These are associated with the patient's problems, diagnostic procedures and treatment options. This includes the possible management options selected and those considered but not selected by the patient after shared medical decision making we discussed with the patient.     This note was created using Dragon voice recognition software that occasionally misinterpreted phrases or words.

## 2024-11-21 NOTE — PROGRESS NOTES
[facility]   Physical Therapy Treatment Note      Name: Aimee Palumbo  Clinic Number: [id number]    Therapy Diagnosis:   Encounter Diagnosis   Name Primary?    Knee pain, unspecified chronicity, unspecified laterality Yes     Physician: Dillan Barrera MD    Visit Date: 11/25/2024    Physician Orders: PT Eval and Treat   Medical Diagnosis from Referral: M25.569  Knee pain  Evaluation Date: 11/20/2024  Authorization Period Expiration: 112/31/2024  Plan of Care Expiration: 10/31/25     Visit # / Visits authorized: 1/ 10 +eval  FOTO: 64/100     Precautions: Standard    PTA Visit #: 1/5     Time In: 0705   Time Out: 0747   Total Time: 42 minutes  Total Billable Time: 42 minutes      Subjective     Patient reports: feeling better this week, better than she has felt in a good while. Admits she hasn't yet begun home exercise program but plans to start today. States her pain today is left sciatica pain.   .  She was not compliant with home exercise program. Plans to start today.   Response to previous treatment: no complaints  Functional change: none stated    Pain: 6/10  Location:  left knee     Objective      Objective Measures updated at progress report unless specified.     Treatment   Bold = progressions and newly added exercises    CHARGES BASED ON 1-1 TX:  Aimee received the treatments listed below:    Aimee received therapeutic exercises to develop strength, endurance, ROM, flexibility, posture and core stabilization for 34 minutes including:  Hamstring stretch supine 5 x 10s each  Bridging x 20  SAQ x 20  Quad sets x 20  Hamstring curls-NP  Progressed home exercise program and reviewed with pt.     Manual therapy was applied for 00 minutes after being cleared of contraindications, in the form of:   *      neuromuscular re-education activities to improve posture, muscle activation and control for 08 minutes. The following activities were included:  Sciatic nerve glide left LE  supine x 20  SLR-stopped 2* left sciatic pain    therapeutic activities to improve functional performance for 00 minutes, including:  *    Patient Education and Home Exercises       Education provided:   - Education re: stopping any activity that increases pain, and to not push through pain. Educated pt that they may experience delayed onset muscle soreness and this is normal after exercise. Instructed pt to perform exercises in the bed, not on the floor.     Written Home Exercises Provided: Yes.   Exercises were reviewed and Aimee was able to demonstrate them prior to the end of the session.  Aimee demonstrated good  understanding of the education provided. See Electronic Medical Record under Patient Instructions for exercises provided during therapy sessions    Assessment     Improved pain lately. Not yet compliant with home exercise program but plans to start today. Good response to progression of strengthening, stating it feels good. Left sciatic nerve pain with attempted SLR, so stopped. Good response to sciatic nerve glide for management of left LE symptoms. Pt is motivated and eager to progress. Reported improved pain after session. Educated pt that he/she may feel soreness after session.   Will benefit from continued physical therapy intervention to progress toward goals set forth in plan of care to improve functional mobility and quality of life.     Aimee Is progressing well towards her goals.   Patient prognosis is Good.     Patient will continue to benefit from skilled outpatient physical therapy to address the deficits listed in the problem list box on initial evaluation, provide pt/family education and to maximize pt's level of independence in the home and community environment.     Patient's spiritual, cultural and educational needs considered and pt agreeable to plan of care and goals.     Anticipated barriers to physical therapy: none    Goals:   Short Term Goals: 2 weeks   Ongoing 11/25/2024    Patient will report compliance with home exercise given.  Patient will demonstrate alternate steps ascending/descending stairs using one hand rail.     Long Term Goals: 6 weeks   Ongoing 11/25/2024   Pain-free functional mobility 0/10 100% of the time.  Patient will demonstrate 10' on treadmill 1.3 mph  Improve FOTO score 68/100.    Plan     Continue per POC, progressing as appropriate to achieve stated goals.    Continue with: Plan of care Certification: 11/20/2024 to 1/14/25.  Outpatient Physical Therapy 2 times weekly for 6 weeks to include the following interventions: Electrical Stimulation  , Gait Training, Manual Therapy, Moist Heat/ Ice, Neuromuscular Re-ed, Therapeutic Activities, Therapeutic Exercise, and Ultrasound.     Lolis Jaquez, PTA

## 2024-11-25 ENCOUNTER — CLINICAL SUPPORT (OUTPATIENT)
Dept: REHABILITATION | Facility: HOSPITAL | Age: 74
End: 2024-11-25
Payer: MEDICARE

## 2024-11-25 DIAGNOSIS — M25.569 KNEE PAIN, UNSPECIFIED CHRONICITY, UNSPECIFIED LATERALITY: Primary | ICD-10-CM

## 2024-11-25 PROCEDURE — 97112 NEUROMUSCULAR REEDUCATION: CPT | Mod: PO,CQ

## 2024-11-25 PROCEDURE — 97110 THERAPEUTIC EXERCISES: CPT | Mod: KX,PO,CQ

## 2024-11-26 ENCOUNTER — CLINICAL SUPPORT (OUTPATIENT)
Dept: REHABILITATION | Facility: HOSPITAL | Age: 74
End: 2024-11-26
Payer: MEDICARE

## 2024-11-26 DIAGNOSIS — M25.569 KNEE PAIN, UNSPECIFIED CHRONICITY, UNSPECIFIED LATERALITY: Primary | ICD-10-CM

## 2024-11-26 PROCEDURE — 97110 THERAPEUTIC EXERCISES: CPT | Mod: KX,PO,CQ

## 2024-11-26 PROCEDURE — 97112 NEUROMUSCULAR REEDUCATION: CPT | Mod: PO,CQ

## 2024-11-26 NOTE — PROGRESS NOTES
"[facility]   Physical Therapy Treatment Note      Name: Aimee Palumbo  Clinic Number: [id number]    Therapy Diagnosis:   Encounter Diagnosis   Name Primary?    Knee pain, unspecified chronicity, unspecified laterality Yes     Physician: Dillan Barrera MD    Visit Date: 11/26/2024    Physician Orders: PT Eval and Treat   Medical Diagnosis from Referral: M25.569  Knee pain  Evaluation Date: 11/20/2024  Authorization Period Expiration: 112/31/2024  Plan of Care Expiration: 10/31/25     Visit # / Visits authorized: 2/ 10 +eval  FOTO: 64/100     Precautions: Standard    PTA Visit #: 2/5     Time In: 1602   Time Out: 1648   Total Time: 46 minutes  Total Billable Time: 46 minutes      Subjective     Patient reports: left knee pain is the same. Left lateral hip soreness today which she states is arthritis pain. Having left sciatica pain today. Getting up off the floor has been a lot easier for the last few days, no longer having to hold onto something to get up and "not debilitating like before". .   .  She was compliant with home exercise program  Response to previous treatment: no complaints  Functional change: Getting up off the floor has been a lot easier for the last few days, no longer having to hold onto something to get up and "not debilitating like before". .     Pain: 6/10  Location:  left knee     Objective      Objective Measures updated at progress report unless specified.     Treatment   Bold = progressions and newly added exercises    CHARGES BASED ON 1-1 TX:  Aimee received the treatments listed below:    Aimee received therapeutic exercises to develop strength, endurance, ROM, flexibility, posture and core stabilization for 32 minutes including:  Hamstring stretch supine 5 x 10s each  Bridging x 20  SAQ x 20  Quad sets x 30  LAQ x 15  Hamstring curls, orange band x 30  Progressed home exercise program and reviewed with pt. Education re: stopping any activity that " increases pain, and to not push through pain. Educated pt that they may experience delayed onset muscle soreness and this is normal after exercise.     Manual therapy was applied for 00 minutes after being cleared of contraindications, in the form of:   *      neuromuscular re-education activities to improve posture, muscle activation and control for 14 minutes. The following activities were included:  Sciatic nerve glide left LE supine x 20  SLR x 10-no pain today, cues for core activation  Side lying clam x 10    therapeutic activities to improve functional performance for 00 minutes, including:  *    Patient Education and Home Exercises       Education provided:   - Education re: stopping any activity that increases pain, and to not push through pain. Educated pt that they may experience delayed onset muscle soreness and this is normal after exercise. Instructed pt to perform exercises in the bed, not on the floor.     Written Home Exercises Provided: Yes. Issued orange band for home exercise program  Exercises were reviewed and Aimee was able to demonstrate them prior to the end of the session.  Aimee demonstrated good  understanding of the education provided. See Electronic Medical Record under Patient Instructions for exercises provided during therapy sessions    Assessment     Continued knee pain. Edema left LE-wearing bilateral RISHI hose. Cues for set up of exercises and muscle activation and control. Improving functional mobility per pt report with easier floor transfers with less UE support and pain. Able to perform SLR today without pain. Caution to progress gradually for improved post exercise tolerance. Improved pain reported after session. Educated pt that he/she may feel soreness after session.  Will benefit from continued physical therapy intervention to progress toward goals set forth in plan of care to improve functional mobility and quality of life.     Aimee Is progressing well towards her  goals.   Patient prognosis is Good.     Patient will continue to benefit from skilled outpatient physical therapy to address the deficits listed in the problem list box on initial evaluation, provide pt/family education and to maximize pt's level of independence in the home and community environment.     Patient's spiritual, cultural and educational needs considered and pt agreeable to plan of care and goals.     Anticipated barriers to physical therapy: none    Goals:   Short Term Goals: 2 weeks   Ongoing 11/26/2024   Patient will report compliance with home exercise given.  Patient will demonstrate alternate steps ascending/descending stairs using one hand rail.     Long Term Goals: 6 weeks   Ongoing 11/26/2024   Pain-free functional mobility 0/10 100% of the time.  Patient will demonstrate 10' on treadmill 1.3 mph  Improve FOTO score 68/100.    Plan     Continue per POC, progressing as appropriate to achieve stated goals.    Continue with: Plan of care Certification: 11/20/2024 to 1/14/25.  Outpatient Physical Therapy 2 times weekly for 6 weeks to include the following interventions: Electrical Stimulation  , Gait Training, Manual Therapy, Moist Heat/ Ice, Neuromuscular Re-ed, Therapeutic Activities, Therapeutic Exercise, and Ultrasound.     Lolis Jaquez, PTA

## 2024-12-03 ENCOUNTER — CLINICAL SUPPORT (OUTPATIENT)
Dept: REHABILITATION | Facility: HOSPITAL | Age: 74
End: 2024-12-03
Payer: MEDICARE

## 2024-12-03 DIAGNOSIS — I87.2 VENOUS INSUFFICIENCY OF BOTH LOWER EXTREMITIES: Primary | ICD-10-CM

## 2024-12-03 PROCEDURE — 97530 THERAPEUTIC ACTIVITIES: CPT | Mod: PO

## 2024-12-04 NOTE — PROGRESS NOTES
"  [facility]  Occupational Therapy Treatment Note/ Discharge Summary    Date: 12/3/2024  Name: Aimee Palumbo  Clinic Number: [id number]    Therapy Diagnosis:   Encounter Diagnosis   Name Primary?    Venous insufficiency of both lower extremities Yes   Physician: Dillan Barrera MD     Physician Orders: evaluate and treat  Medical Diagnosis: venous insufficiency  Surgical Procedure and Date na  Evaluation Date: 11/18/2024  Insurance Authorization Period Expiration: 12/31/2024  Plan of Care Certification Period: 12/31/2024  Progress Note Due: 12/31/2024   Date of Return to MD: to be scheduled  Visit # / Visits authorized: 1/20  FOTO: intake completed/     Precautions:  Standard and Fall  Plan of Care Expiration: 12/31/2024  Visit # / Visits authorized: 1 / 12    Precautions:  Standard    Time In: 1215  Time Out: 1300  Total Billable Time: 45 minutes    SUBJECTIVE     Pt reports: "What are you going to do today"    Response to previous treatment: this is first treatment  Functional change: na    Pain: 0/10  Location: no report of pain    OBJECTIVE     Objective Measures updated at progress report unless specified.    Treatment     Aimee received the treatments listed below:     Therapeutic activities to improve functional performance for 45  minutes, including:  Aimee arrives for first appointment. Extended time since evaluation: reviewed all education that was covered at evaluation. Extended time with education in regards to lymphedema compared to hyperlipidemia compared to venous insufficiency. Measurements compared to initial. Gave Aimee specifics for compression garments/stockings to improve her circulation of lower legs.    Patient Education and Home Exercises      Education provided:   1. Educated on definition of lymphedema.  2. Explained the Complete Decongestive Therapy protocol in depth  3. Educated on Phase 1 and 2 of protocol.  4. Reviewed treatment frequency and likely " duration of weeks  5.Contraindications for treatment.  6. Plan of care and goals.  7. Educated on home management protocols.     Written Home Exercises Provided:  no .     Assessment     Pt would continue to benefit from skilled OT. Marcos Aimee is not a candidate for Complete Decongestive Therapy       Anticipated barriers to occupational therapy: is not candidate for Complete Decongestive Therapy          Discharge Occupational Therapy services  JOSE Lopez

## 2024-12-05 ENCOUNTER — CLINICAL SUPPORT (OUTPATIENT)
Dept: REHABILITATION | Facility: HOSPITAL | Age: 74
End: 2024-12-05
Payer: MEDICARE

## 2024-12-05 DIAGNOSIS — M25.569 KNEE PAIN, UNSPECIFIED CHRONICITY, UNSPECIFIED LATERALITY: Primary | ICD-10-CM

## 2024-12-05 PROCEDURE — 97112 NEUROMUSCULAR REEDUCATION: CPT | Mod: KX,PO,CQ

## 2024-12-05 PROCEDURE — 97110 THERAPEUTIC EXERCISES: CPT | Mod: PO,CQ

## 2024-12-05 NOTE — PROGRESS NOTES
"[facility]   Physical Therapy Treatment Note      Name: Aimee Palumbo  Clinic Number: [id number]    Therapy Diagnosis:   Encounter Diagnosis   Name Primary?    Knee pain, unspecified chronicity, unspecified laterality Yes       Physician: Dillan Barrera MD    Visit Date: 12/5/2024    Physician Orders: PT Eval and Treat   Medical Diagnosis from Referral: M25.569  Knee pain  Evaluation Date: 11/20/2024  Authorization Period Expiration: 112/31/2024  Plan of Care Expiration: 10/31/25     Visit # / Visits authorized: 3/ 10 +eval  FOTO: 64/100     Precautions: Standard    PTA Visit #: 3/5     Time In: 1050   Time Out: 1133   Total Time: 43 minutes  Total Billable Time: 43 minutes      Subjective     Patient reports: feeling great. Only time she has pain with with bending over or squatting, and pain level is decreased, and is not in her knee. States the pain is "sciatic" and is lateral left calf only. "I feel more myself, getting up off the floor. Before, it was so debilitating."    She was compliant with home exercise program  Response to previous treatment: no complaints  Functional change:  "I feel more myself, getting up off the floor. Before, it was so debilitating."    Pain: 0/10 at rest and with walking, 3/10 with bending and squatting  Location:  left calf    Objective      Objective Measures updated at progress report unless specified.     Treatment   Bold = progressions and newly added exercises    CHARGES BASED ON 1-1 TX:  Aimee received the treatments listed below:    Aimee received therapeutic exercises to develop strength, endurance, ROM, flexibility, posture and core stabilization for 28 minutes including:  Hamstring stretch supine 5 x 10s each  Bridging x 30  SAQ x 20  Quad sets x 30  LAQ x 15  NP-Hamstring curls, orange band x 30  Progressed home exercise program and reviewed with pt. Education re: stopping any activity that increases pain, and to not push through pain. " "Educated pt that they may experience delayed onset muscle soreness and this is normal after exercise.     Manual therapy was applied for 00 minutes after being cleared of contraindications, in the form of:   *      neuromuscular re-education activities to improve posture, muscle activation and control for 15 minutes. The following activities were included:    NuStep L2 5' UE's and LE's  Sciatic nerve glide left LE seated x 20  SLR x 15-no pain today, cues for core activation  NP-Side lying clam x 10  Side lying hip abduction x 5    therapeutic activities to improve functional performance for 00 minutes, including:  *    Patient Education and Home Exercises       Education provided:   - Education re: stopping any activity that increases pain, and to not push through pain. Educated pt that they may experience delayed onset muscle soreness and this is normal after exercise. Instructed pt to perform exercises in the bed, not on the floor.     Written Home Exercises Provided: Yes. Issued orange band for home exercise program  Exercises were reviewed and Aimee was able to demonstrate them prior to the end of the session.  Aimee demonstrated good  understanding of the education provided. See Electronic Medical Record under Patient Instructions for exercises provided during therapy sessions    Assessment     Improving knee pain. Continued "sciatic" pain left calf with squatting, so stopped attempt at minisquats. Initiated sea ed sciatic nerve glide with relief. Progressing well with strengthening without pain provocation. Education re: stopping any activity that increases pain, and to not push through pain. Educated pt that they may experience delayed onset muscle soreness and this is normal after exercise.Will benefit from continued physical therapy intervention to progress toward goals set forth in plan of care to improve functional mobility and quality of life.     Aimee Is progressing well towards her goals. "   Patient prognosis is Good.     Patient will continue to benefit from skilled outpatient physical therapy to address the deficits listed in the problem list box on initial evaluation, provide pt/family education and to maximize pt's level of independence in the home and community environment.     Patient's spiritual, cultural and educational needs considered and pt agreeable to plan of care and goals.     Anticipated barriers to physical therapy: none    Goals:   Short Term Goals: 2 weeks   Ongoing 12/5/2024   Patient will report compliance with home exercise given.  Patient will demonstrate alternate steps ascending/descending stairs using one hand rail.     Long Term Goals: 6 weeks   Ongoing 12/5/2024   Pain-free functional mobility 0/10 100% of the time.  Patient will demonstrate 10' on treadmill 1.3 mph  Improve FOTO score 68/100.    Plan     Continue per POC, progressing as appropriate to achieve stated goals.    Continue with: Plan of care Certification: 11/20/2024 to 1/14/25.  Outpatient Physical Therapy 2 times weekly for 6 weeks to include the following interventions: Electrical Stimulation  , Gait Training, Manual Therapy, Moist Heat/ Ice, Neuromuscular Re-ed, Therapeutic Activities, Therapeutic Exercise, and Ultrasound.     Lolis Jaquez, PTA

## 2024-12-11 ENCOUNTER — CLINICAL SUPPORT (OUTPATIENT)
Dept: REHABILITATION | Facility: HOSPITAL | Age: 74
End: 2024-12-11
Payer: MEDICARE

## 2024-12-11 DIAGNOSIS — M25.569 KNEE PAIN, UNSPECIFIED CHRONICITY, UNSPECIFIED LATERALITY: Primary | ICD-10-CM

## 2024-12-11 PROCEDURE — 97110 THERAPEUTIC EXERCISES: CPT | Mod: PO

## 2024-12-11 NOTE — PATIENT INSTRUCTIONS
Hamstring Set        With one leg bent slightly, push heel into bed without bending knee further. Hold ____ seconds. Alternate legs.  Repeat ____ times. Do ____ sessions per day.

## 2024-12-11 NOTE — PROGRESS NOTES
"[facility]   Physical Therapy Treatment Note      Name: Aimee Palumbo  Clinic Number: [id number]    Therapy Diagnosis:   Encounter Diagnosis   Name Primary?    Knee pain, unspecified chronicity, unspecified laterality Yes       Physician: Dillan Barrera MD    Visit Date: 12/11/2024    Physician Orders: PT Eval and Treat   Medical Diagnosis from Referral: M25.569  Knee pain  Evaluation Date: 11/20/2024  Authorization Period Expiration: 112/31/2024  Plan of Care Expiration: 10/31/25     Visit # / Visits authorized: 3/ 10 +eval  FOTO: 64/100     Precautions: Standard    Time In: 0830   Time Out: 0915   Total Time: 45 minutes    Subjective     Patient reports: no pain except at night.    She was compliant with home exercise program  Response to previous treatment: no complaints  Functional change:  "I feel more myself, getting up off the floor. Before, it was so debilitating."    Pain: 0/10   Location:  none indicated    Objective      SLR  90 deg bilat    Treatment     Aimee received the treatments listed below:    Aimee received therapeutic exercises to develop strength, endurance, ROM, flexibility, posture and core stabilization for 28 minutes including:  Nustep L4 10'  Standing gastroc stretch  2'  Supine hamstring stretch 2'  Quad sets 20/20  Hamstring stretch 20/20  SAQ's 4#s 20/20   SLR 20/10x2   R/L  Seated hamstring curls manual resist 20/20    Patient Education and Home Exercises       Education provided:   - Instructed on active muscle pum/isometrics. Also encouraged to avoid sitting too long during card games    Written Home Exercises Provided: Yes. Issued orange band for home exercise program  Exercises were reviewed and Aimee was able to demonstrate them prior to the end of the session.  Aimee demonstrated good  understanding of the education provided. See Electronic Medical Record under Patient Instructions for exercises provided during therapy " sessions    Assessment     Patient states she's been doing exercises at home but the hamstring/quad sets is new to her. No elicit of pain during session.  Edema to BLE's.  Tolerated Rx well.    Aimee Is progressing well towards her goals.   Patient prognosis is Good.     Patient will continue to benefit from skilled outpatient physical therapy to address the deficits listed in the problem list box on initial evaluation, provide pt/family education and to maximize pt's level of independence in the home and community environment.     Patient's spiritual, cultural and educational needs considered and pt agreeable to plan of care and goals.     Anticipated barriers to physical therapy: none    Goals:   Short Term Goals:    Patient will report compliance with home exercise given.  (Ongoing)  Patient will demonstrate alternate steps ascending/descending stairs using one hand rail.  (Ongoing)     Long Term Goals:   Pain-free functional mobility 0/10 100% of the time.  (Ongoing)  Patient will demonstrate 10' on treadmill 1.3 mph  (ongoing)  Improve FOTO score 68/100.  (Ongoing)    Plan     Strengthening  Continue Plan of care.    Eder Mejia, PT

## 2024-12-12 ENCOUNTER — TELEPHONE (OUTPATIENT)
Dept: REHABILITATION | Facility: HOSPITAL | Age: 74
End: 2024-12-12
Payer: MEDICARE

## 2024-12-12 NOTE — TELEPHONE ENCOUNTER
Called re: missed appt. Pt apologized and stated she couldn't come in today. Not having any pain. Confirmed her appts on Mon and Tues, Dec 16 and 17.

## 2024-12-12 NOTE — PROGRESS NOTES
"[facility]   Physical Therapy Treatment Note      Name: Aimee Palumbo  Clinic Number: [id number]    Therapy Diagnosis:   Encounter Diagnosis   Name Primary?    Knee pain, unspecified chronicity, unspecified laterality Yes       Physician: Dillan Barrera MD    Visit Date: 12/16/2024    Physician Orders: PT Eval and Treat   Medical Diagnosis from Referral: M25.569  Knee pain  Evaluation Date: 11/20/2024  Authorization Period Expiration: 12/31/2024  Plan of Care Expiration: 1/14/25      Visit # / Visits authorized: 5/ 10 +eval  FOTO: 64/100    FOTO goal: 69  FOTO Date  Score    #1/3 (intake) 64   #2/3 12/16/2024  70   #3/3          Precautions: Standard    Time In: 0705   Time Out: 0800   Total Time: 55 minutes  Total Billable Time: 50 minutes    Subjective     Patient reports: pain intensity left sciatica is still high at 9/10 but only for a short time in the morning and then resolves. Presents for her 7am appt without pain. States she is considering joining a gym to maintain her gains after she is discharged. Has not had knee pain since October when she got the shots.     She was compliant with home exercise program  Response to previous treatment: no complaints  Functional change:  "I feel more myself, getting up off the floor. Before, it was so debilitating."    Pain: 0/10   Location:  none indicated    Objective      SLR  90 deg bilat    Treatment   Bold = progressions and newly added exercises    CHARGES BASED ON 1-1 TX:  Aimee received the treatments listed below:    Aimee received therapeutic exercises to develop strength, endurance, ROM, flexibility, posture and core stabilization for 32 minutes including:  Nustep L4 10' UE's and LE's  Standing gastroc stretch  2'-manual  Supine hamstring stretch 2'-manual  SAQ's 4#s 20/20   SLR 20/10x2   R/L  Seated hamstring curls manual resist 20/20  Standing gastroc stretch on 1/2 roll 3 x 30s      neuromuscular re-education activities " to improve: muscle activation and control, posture for 18 minutes. The following activities were included:  Side lying hip abduction x 10  Reverse push/pull x 3  Quad sets 20/20  Bridges x 20  Minisquats x 10-cues for weight shifting  Seated reverse squish x 3    May consider next session: stairs, TM    Patient Education and Home Exercises       Education provided:   - Instructed on active muscle pum/isometrics. Also encouraged to avoid sitting too long during card games    Written Home Exercises Provided: Yes.   Exercises were reviewed and Aimee was able to demonstrate them prior to the end of the session.  Aimee demonstrated good  understanding of the education provided. See Electronic Medical Record under Patient Instructions for exercises provided during therapy sessions    Assessment     Improving overall pain as pt reports decreased duration to just a few minutes in the mornings and states it is her sciatic pain left LE. Noted to have pelvic dysfunction which was resolved with muscle energy technique and pt was then able to perform functional mobility such as squats and bed mobility without pain provocation. Educated pt that he/she may feel soreness after session. Will benefit from continued physical therapy intervention to progress toward goals set forth in plan of care to improve functional mobility and quality of life.     Aimee Is progressing well towards her goals.   Patient prognosis is Good.     Patient will continue to benefit from skilled outpatient physical therapy to address the deficits listed in the problem list box on initial evaluation, provide pt/family education and to maximize pt's level of independence in the home and community environment.     Patient's spiritual, cultural and educational needs considered and pt agreeable to plan of care and goals.     Anticipated barriers to physical therapy: none    Goals:   Short Term Goals:    Patient will report compliance with home exercise  given.  (Ongoing)  Patient will demonstrate alternate steps ascending/descending stairs using one hand rail.  (Ongoing)     Long Term Goals:   Pain-free functional mobility 0/10 100% of the time.  (Ongoing)  Patient will demonstrate 10' on treadmill 1.3 mph  (ongoing)  Improve FOTO score 68/100.  (Ongoing)    Plan     Strengthening  Continue Plan of care.    Lolis Jaquez, PTA

## 2024-12-16 ENCOUNTER — CLINICAL SUPPORT (OUTPATIENT)
Dept: REHABILITATION | Facility: HOSPITAL | Age: 74
End: 2024-12-16
Payer: MEDICARE

## 2024-12-16 DIAGNOSIS — M25.569 KNEE PAIN, UNSPECIFIED CHRONICITY, UNSPECIFIED LATERALITY: Primary | ICD-10-CM

## 2024-12-16 PROCEDURE — 97112 NEUROMUSCULAR REEDUCATION: CPT | Mod: PO,CQ

## 2024-12-16 PROCEDURE — 97110 THERAPEUTIC EXERCISES: CPT | Mod: KX,PO,CQ

## 2024-12-17 ENCOUNTER — CLINICAL SUPPORT (OUTPATIENT)
Dept: REHABILITATION | Facility: HOSPITAL | Age: 74
End: 2024-12-17
Payer: MEDICARE

## 2024-12-17 DIAGNOSIS — M25.569 KNEE PAIN, UNSPECIFIED CHRONICITY, UNSPECIFIED LATERALITY: Primary | ICD-10-CM

## 2024-12-17 PROCEDURE — 97110 THERAPEUTIC EXERCISES: CPT | Mod: PO

## 2024-12-17 NOTE — PROGRESS NOTES
[facility]   Physical Therapy Treatment Note      Name: Aimee Palumbo  Clinic Number: [id number]    Therapy Diagnosis:   Encounter Diagnosis   Name Primary?    Knee pain, unspecified chronicity, unspecified laterality Yes         Physician: Dillan Barrera MD    Visit Date: 12/17/2024    Physician Orders: PT Eval and Treat   Medical Diagnosis from Referral: M25.569  Knee pain  Evaluation Date: 11/20/2024  Authorization Period Expiration: 112/31/2024  Plan of Care Expiration: 10/31/25     Visit # / Visits authorized: 6/ 10 (7)  FOTO: 64/100     Precautions: Standard    Time In: 0745   Time Out: 0830   Total Time: 45 minutes    Subjective     Patient reports: feeling a whole lot better today.  She was compliant with home exercise program  Response to previous treatment: no complaints  Functional change:      Pain: 0/10   Location:  none indicated    Objective      SLR  90 deg bilat    Treatment     Aimee received the treatments listed below:    Aimee received therapeutic exercises to develop strength, endurance, ROM, flexibility, posture and core stabilization for 45minutes including:  Nustep L4 10'  Standing gastroc stretch  2'  Heel raises 20  Mni squats 20  Supine hamstring stretch 2'  Quad sets 20/20  Hamstring sets 20/20  SAQ's 4#s 20/20   SLR 20/20  #3#s mid leg.  Bridging 20  Crroklying hip abduction/adduction  Seated hamstring curls manual resist 20/20    Patient Education and Home Exercises       Education provided:   - Minimize stairs if possible    Written Home Exercises Provided: Yes. Issued orange band for home exercise program  Exercises were reviewed and Aimee was able to demonstrate them prior to the end of the session.  Aimee demonstrated good  understanding of the education provided. See Electronic Medical Record under Patient Instructions for exercises provided during therapy sessions    Assessment     Patient is doing much better with less anticipation to  resistive movements.  Tolerated Rx well.    Aimee Is progressing well towards her goals.   Patient prognosis is Good.     Patient will continue to benefit from skilled outpatient physical therapy to address the deficits listed in the problem list box on initial evaluation, provide pt/family education and to maximize pt's level of independence in the home and community environment.     Patient's spiritual, cultural and educational needs considered and pt agreeable to plan of care and goals.     Anticipated barriers to physical therapy: none    Goals:   Short Term Goals:    Patient will report compliance with home exercise given.  (Ongoing)  Patient will demonstrate alternate steps ascending/descending stairs using one hand rail.  (Ongoing)     Long Term Goals:   Pain-free functional mobility 0/10 100% of the time.  (Ongoing)  Patient will demonstrate 10' on treadmill 1.3 mph  (ongoing)  Improve FOTO score 68/100.  (Ongoing)    Plan     Strengthening  Continue Plan of care.    Eder Mejia, PT

## 2024-12-17 NOTE — PROGRESS NOTES
"[facility]   Physical Therapy Treatment Note      Name: Aimee Palumbo  Clinic Number: [id number]    Therapy Diagnosis:   No diagnosis found.      Physician: Dillan Barrera MD    Visit Date: 12/17/2024    Physician Orders: PT Eval and Treat   Medical Diagnosis from Referral: M25.569  Knee pain  Evaluation Date: 11/20/2024  Authorization Period Expiration: 12/31/2024  Plan of Care Expiration: 1/14/25      Visit # / Visits authorized: 5/ 10 +eval  FOTO: 64/100    FOTO goal: 69  FOTO Date  Score    #1/3 (intake) 64   #2/3 12/16/2024  70   #3/3          Precautions: Standard    Time In: 0705   Time Out: 0800   Total Time: 55 minutes  Total Billable Time: 50 minutes    Subjective     Patient reports: pain intensity left sciatica is still high at 9/10 but only for a short time in the morning and then resolves. Presents for her 7am appt without pain. States she is considering joining a gym to maintain her gains after she is discharged. Has not had knee pain since October when she got the shots.     She was compliant with home exercise program  Response to previous treatment: no complaints  Functional change:  "I feel more myself, getting up off the floor. Before, it was so debilitating."    Pain: 0/10   Location:  none indicated    Objective      SLR  90 deg bilat    Treatment     Aimee received the treatments listed below:    Aimee received therapeutic exercises to develop strength, endurance, ROM, flexibility, posture and core stabilization for 32 minutes including:  Nustep L4 10' UE's and LE's  Standing gastroc stretch  2'-manual  Supine hamstring stretch 2'-manual  SAQ's 4#s 20/20   SLR 20/10x2   R/L  Seated hamstring curls manual resist 20/20        neuromuscular re-education activities to improve: muscle activation and control, posture for 18 minutes. The following activities were included:  Side lying hip abduction x 10  Reverse push/pull x 3  Quad sets 20/20  Bridges x " 20  Minisquats x 10-cues for weight shifting  Seated reverse squish x 3    May consider next session: stairs, TM    Patient Education and Home Exercises       Education provided:   - Instructed on active muscle pum/isometrics. Also encouraged to avoid sitting too long during card games    Written Home Exercises Provided: Yes.   Exercises were reviewed and Aimee was able to demonstrate them prior to the end of the session.  Aimee demonstrated good  understanding of the education provided. See Electronic Medical Record under Patient Instructions for exercises provided during therapy sessions    Assessment     Improving overall pain as pt reports decreased duration to just a few minutes in the mornings and states it is her sciatic pain left LE. Noted to have pelvic dysfunction which was resolved with muscle energy technique and pt was then able to perform functional mobility such as squats and bed mobility without pain provocation. Educated pt that he/she may feel soreness after session. Will benefit from continued physical therapy intervention to progress toward goals set forth in plan of care to improve functional mobility and quality of life.     Aimee Is progressing well towards her goals.   Patient prognosis is Good.     Patient will continue to benefit from skilled outpatient physical therapy to address the deficits listed in the problem list box on initial evaluation, provide pt/family education and to maximize pt's level of independence in the home and community environment.     Patient's spiritual, cultural and educational needs considered and pt agreeable to plan of care and goals.     Anticipated barriers to physical therapy: none    Goals:   Short Term Goals:    Patient will report compliance with home exercise given.  (Ongoing)  Patient will demonstrate alternate steps ascending/descending stairs using one hand rail.  (Ongoing)     Long Term Goals:   Pain-free functional mobility 0/10 100% of the  time.  (Ongoing)  Patient will demonstrate 10' on treadmill 1.3 mph  (ongoing)  Improve FOTO score 68/100.  (Ongoing)    Plan     Strengthening  Continue Plan of care.    Eder Mejia, PT

## 2024-12-19 NOTE — PROGRESS NOTES
[facility]   Physical Therapy Treatment Note      Name: Aimee Palumbo  Clinic Number: [id number]    Therapy Diagnosis:   Encounter Diagnosis   Name Primary?    Knee pain, unspecified chronicity, unspecified laterality Yes       Physician: Dillan Barrera MD    Visit Date: 12/23/2024    Physician Orders: PT Eval and Treat   Medical Diagnosis from Referral: M25.569  Knee pain  Evaluation Date: 11/20/2024  Authorization Period Expiration: 112/31/2024  Plan of Care Expiration: 10/31/25     Visit # / Visits authorized: 7/ 10 (8)  FOTO: 64/100     Precautions: Standard    Time In: 0702   Time Out: 0757   Total Time: 55 minutes  Total Billable Time: 53 minutes    PTA visit # 1/5    Subjective     Patient reports: feeling a whole lot better today.  no pain with squatting to  things form the floor. Plans to join gym after discharge to maintain gains  She was compliant with home exercise program once daily  Response to previous treatment: no complaints  Functional change:  no pain with squatting to  things form the floor.     Pain: 0/10   Location:  none indicated    Objective      SLR  90 deg bilat    Treatment     Aimee received the treatments listed below:    Aimee received therapeutic exercises to develop strength, endurance, ROM, flexibility, posture and core stabilization for 23 minutes including:  Nustep L4 10'  Standing gastroc stretch  2'  Seated hamstring stretch 2'  NP-Quad sets 20/20  NP-Hamstring sets 20/20  SAQ's 4#s 20/20   DF-100 knee flexion 22# x 10      neuromuscular re-education activities to improve muscle activation and control, posture for 30 minutes. The following activities were included:  Heel raises, airex  20  Mni squats, airex 20  SLR 20/20  #3#s   Bridging, lime band 20  NP-Crroklying hip abduction/adduction  Supine clams, lime band x 20  Seated hamstring curls manual resist 20/20  Side lying hip abduction x 10  Prone hip extension x 10  Shuttle  bilateral leg press next session    Patient Education and Home Exercises       Education provided:   - Minimize stairs if possible    Written Home Exercises Provided: Yes. Issued orange band for home exercise program  Exercises were reviewed and Aimee was able to demonstrate them prior to the end of the session.  Aimee demonstrated good  understanding of the education provided. See Electronic Medical Record under Patient Instructions for exercises provided during therapy sessions    Assessment     Continues to manage symptoms well and remain pain-free. Able to progress to airex for balance training with strengthening. Progressing well with strengthening with manual cues for muscle activation and control with appropriate challenge. No pain provocation this session but does have left calf discomfort with standing exercises which she states is from venous insufficiency.  Will benefit from continued physical therapy intervention to progress toward goals set forth in plan of care to improve functional mobility and quality of life.     Aimee Is progressing well towards her goals.   Patient prognosis is Good.     Patient will continue to benefit from skilled outpatient physical therapy to address the deficits listed in the problem list box on initial evaluation, provide pt/family education and to maximize pt's level of independence in the home and community environment.     Patient's spiritual, cultural and educational needs considered and pt agreeable to plan of care and goals.     Anticipated barriers to physical therapy: none    Goals:   Short Term Goals:    Patient will report compliance with home exercise given.  (Ongoing)  Patient will demonstrate alternate steps ascending/descending stairs using one hand rail.  (Ongoing)     Long Term Goals:   Pain-free functional mobility 0/10 100% of the time.  (Ongoing)  Patient will demonstrate 10' on treadmill 1.3 mph  (ongoing)  Improve FOTO score 68/100.   (Ongoing)    Plan     Strengthening  Continue Plan of care.    Lolis Jaquez, PTA

## 2024-12-23 ENCOUNTER — CLINICAL SUPPORT (OUTPATIENT)
Dept: REHABILITATION | Facility: HOSPITAL | Age: 74
End: 2024-12-23
Payer: MEDICARE

## 2024-12-23 DIAGNOSIS — M25.569 KNEE PAIN, UNSPECIFIED CHRONICITY, UNSPECIFIED LATERALITY: Primary | ICD-10-CM

## 2024-12-23 PROCEDURE — 97110 THERAPEUTIC EXERCISES: CPT | Mod: KX,PO,CQ

## 2024-12-23 PROCEDURE — 97112 NEUROMUSCULAR REEDUCATION: CPT | Mod: PO,CQ

## 2024-12-23 NOTE — PROGRESS NOTES
"[facility]   Physical Therapy Treatment Note      Name: Aimee Palumbo  Clinic Number: [id number]    Therapy Diagnosis:   Encounter Diagnosis   Name Primary?    Knee pain, unspecified chronicity, unspecified laterality Yes       Physician: Dillan Barrera MD    Visit Date: 12/24/2024    Physician Orders: PT Eval and Treat   Medical Diagnosis from Referral: M25.569  Knee pain  Evaluation Date: 11/20/2024  Authorization Period Expiration: 112/31/2024  Plan of Care Expiration: 10/31/25     Visit # / Visits authorized: 8/ 10 (9)  FOTO: 64/100     Precautions: Standard    Time In: 0722   Time Out: 0812   Total Time: 50 minutes  Total Billable Time: 50 minutes    PTA visit # 2/5    Subjective     Patient reports: no pain. Plans to start the gym very soon. Feels like her is "more even" since she is no longer limping with gait.     She was compliant with home exercise program once daily  Response to previous treatment: no complaints  Functional change:  no pain with squatting to  things form the floor.  Feels like her is "more even" since she is no longer limping with gait.     Pain: 0/10   Location:  none indicated    Objective      SLR  90 deg bilat    Treatment   Bold = progressions and newly added exercises    CHARGES BASED ON 1-1 TX:  Aimee received the treatments listed below:    Aimee received therapeutic exercises to develop strength, endurance, ROM, flexibility, posture and core stabilization for 14 minutes including:  Nustep L4 10' UE's and LE's  Standing gastroc stretch  3 x 30s  Standing soleus stretch 2 x 30s on 1/2 foam roll  Seated hamstring stretch 2 x 30s  NP-Quad sets 20/20  NP-Hamstring sets 20/20  NP-SAQ's 4#s 20/20   NP-DF-100 knee flexion 22# x 10    neuromuscular re-education activities to improve muscle activation and control, posture for 08 minutes. The following activities were included:  Heel raises/Toe raises, airex  20  Mni squats, airex 20  SLS 2 x 30s " each    Not performed this date:  SLR 20/20  3#s   Bridging, lime band 20  NP-Crooklying hip abduction/adduction  Supine clams, lime band x 20  Seated hamstring curls manual resist 20/20  Side lying hip abduction x 10  Prone hip extension x 10    therapeutic activities to improve functional performance for 28 minutes, including:  Shuttle bilateral leg press 43# 2 x 10 with instruction for proper performance at gym and protection of knees and back.   Discussion re: cardio strengthening with aerobic activities, rest between legs days and such at the gym, progressions of standing balance/knee stabilization activities, stopping any activity that  produces pain        Patient Education and Home Exercises       Education provided:   - Minimize stairs if possible    Written Home Exercises Provided: Yes. Issued orange band for home exercise program  Exercises were reviewed and Aimee was able to demonstrate them prior to the end of the session.  Aimee demonstrated good  understanding of the education provided. See Electronic Medical Record under Patient Instructions for exercises provided during therapy sessions    Assessment     Continued improvement in pain and pt remains highly motivated. Good home exercise program compliance. Able to progress to airex for balance training with strengthening. Progressing well with strengthening with manual cues for muscle activation and control with appropriate challenge. No pain provocation this session but does have left calf discomfort with standing exercises which she states is from venous insufficiency.  Improved pain after session. Will benefit from continued physical therapy intervention to progress toward goals set forth in plan of care to improve functional mobility and quality of life.     Aimee Is progressing well towards her goals.   Patient prognosis is Good.     Patient will continue to benefit from skilled outpatient physical therapy to address the deficits listed in  the problem list box on initial evaluation, provide pt/family education and to maximize pt's level of independence in the home and community environment.     Patient's spiritual, cultural and educational needs considered and pt agreeable to plan of care and goals.     Anticipated barriers to physical therapy: none    Goals:   Short Term Goals:    Patient will report compliance with home exercise given.  (Ongoing)  Patient will demonstrate alternate steps ascending/descending stairs using one hand rail.  (Ongoing)     Long Term Goals:   Pain-free functional mobility 0/10 100% of the time.  (Ongoing)  Patient will demonstrate 10' on treadmill 1.3 mph  (ongoing)  Improve FOTO score 68/100.  (Ongoing)    Plan     Strengthening  Continue Plan of care.    Lolis Jaquez, PTA

## 2024-12-24 ENCOUNTER — CLINICAL SUPPORT (OUTPATIENT)
Dept: REHABILITATION | Facility: HOSPITAL | Age: 74
End: 2024-12-24
Payer: MEDICARE

## 2024-12-24 ENCOUNTER — PATIENT MESSAGE (OUTPATIENT)
Dept: REHABILITATION | Facility: HOSPITAL | Age: 74
End: 2024-12-24

## 2024-12-24 DIAGNOSIS — M25.569 KNEE PAIN, UNSPECIFIED CHRONICITY, UNSPECIFIED LATERALITY: Primary | ICD-10-CM

## 2024-12-24 PROCEDURE — 97110 THERAPEUTIC EXERCISES: CPT | Mod: KX,PO,CQ

## 2024-12-24 PROCEDURE — 97112 NEUROMUSCULAR REEDUCATION: CPT | Mod: PO,CQ

## 2024-12-24 PROCEDURE — 97530 THERAPEUTIC ACTIVITIES: CPT | Mod: PO,CQ

## 2024-12-24 NOTE — PATIENT INSTRUCTIONS
The next two exercises are for when you feel the leg pain. You can either do the one seated or the one laying on your back, depending on your position. It is always with right knee up, and do not perform on the other side. If this ever causes increased symptoms, stop performing this.

## 2024-12-30 ENCOUNTER — CLINICAL SUPPORT (OUTPATIENT)
Dept: REHABILITATION | Facility: HOSPITAL | Age: 74
End: 2024-12-30
Payer: MEDICARE

## 2024-12-30 DIAGNOSIS — M25.569 KNEE PAIN, UNSPECIFIED CHRONICITY, UNSPECIFIED LATERALITY: Primary | ICD-10-CM

## 2024-12-30 PROCEDURE — 97110 THERAPEUTIC EXERCISES: CPT | Mod: PO

## 2024-12-30 NOTE — PROGRESS NOTES
"[facility]   Physical Therapy Treatment Note      Name: Aimee Palumbo  Clinic Number: [id number]    Therapy Diagnosis:   No diagnosis found.      Physician: Dillan Barrera MD    Visit Date: 12/30/2024    Physician Orders: PT Eval and Treat   Medical Diagnosis from Referral: M25.569  Knee pain  Evaluation Date: 11/20/2024  Authorization Period Expiration: 112/31/2024  Plan of Care Expiration: 10/31/25     Visit # / Visits authorized: 8/ 10 (9)  FOTO: 64/100     Precautions: Standard    Time In: 0722   Time Out: 0812   Total Time: 50 minutes  Total Billable Time: 50 minutes    PTA visit # 2/5    Subjective     Patient reports: no pain. Plans to start the gym very soon. Feels like her is "more even" since she is no longer limping with gait.     She was compliant with home exercise program once daily  Response to previous treatment: no complaints  Functional change:  no pain with squatting to  things form the floor.  Feels like her is "more even" since she is no longer limping with gait.     Pain: 0/10   Location:  none indicated    Objective      SLR  90 deg bilat    Treatment   Bold = progressions and newly added exercises    CHARGES BASED ON 1-1 TX:  Aimee received the treatments listed below:    Aimee received therapeutic exercises to develop strength, endurance, ROM, flexibility, posture and core stabilization for 14 minutes including:  Nustep L4 10' UE's and LE's  Standing gastroc stretch  3 x 30s  Standing soleus stretch 2 x 30s on 1/2 foam roll  Seated hamstring stretch 2 x 30s  NP-Quad sets 20/20  NP-Hamstring sets 20/20  NP-SAQ's 4#s 20/20   NP-DF-100 knee flexion 22# x 10    neuromuscular re-education activities to improve muscle activation and control, posture for 08 minutes. The following activities were included:  Heel raises/Toe raises, airex  20  Mni squats, airex 20  SLS 2 x 30s each    Not performed this date:  SLR 20/20  3#s   Bridging, lime band " 20  NP-Crooklying hip abduction/adduction  Supine clams, lime band x 20  Seated hamstring curls manual resist 20/20  Side lying hip abduction x 10  Prone hip extension x 10    therapeutic activities to improve functional performance for 28 minutes, including:  Shuttle bilateral leg press 43# 2 x 10 with instruction for proper performance at gym and protection of knees and back.   Discussion re: cardio strengthening with aerobic activities, rest between legs days and such at the gym, progressions of standing balance/knee stabilization activities, stopping any activity that  produces pain        Patient Education and Home Exercises       Education provided:   - Minimize stairs if possible    Written Home Exercises Provided: Yes. Issued orange band for home exercise program  Exercises were reviewed and Aimee was able to demonstrate them prior to the end of the session.  Aimee demonstrated good  understanding of the education provided. See Electronic Medical Record under Patient Instructions for exercises provided during therapy sessions    Assessment     Continued improvement in pain and pt remains highly motivated. Good home exercise program compliance. Able to progress to airex for balance training with strengthening. Progressing well with strengthening with manual cues for muscle activation and control with appropriate challenge. No pain provocation this session but does have left calf discomfort with standing exercises which she states is from venous insufficiency.  Improved pain after session. Will benefit from continued physical therapy intervention to progress toward goals set forth in plan of care to improve functional mobility and quality of life.     Aimee Is progressing well towards her goals.   Patient prognosis is Good.     Patient will continue to benefit from skilled outpatient physical therapy to address the deficits listed in the problem list box on initial evaluation, provide pt/family education  and to maximize pt's level of independence in the home and community environment.     Patient's spiritual, cultural and educational needs considered and pt agreeable to plan of care and goals.     Anticipated barriers to physical therapy: none    Goals:   Short Term Goals:    Patient will report compliance with home exercise given.  (Ongoing)  Patient will demonstrate alternate steps ascending/descending stairs using one hand rail.  (Ongoing)     Long Term Goals:   Pain-free functional mobility 0/10 100% of the time.  (Ongoing)  Patient will demonstrate 10' on treadmill 1.3 mph  (ongoing)  Improve FOTO score 68/100.  (Ongoing)    Plan     Strengthening  Continue Plan of care.    Eder Mejia, PT

## 2025-01-15 ENCOUNTER — TELEPHONE (OUTPATIENT)
Dept: ORTHOPEDICS | Facility: CLINIC | Age: 75
End: 2025-01-15
Payer: MEDICARE

## 2025-01-15 DIAGNOSIS — M54.16 LUMBAR RADICULOPATHY: ICD-10-CM

## 2025-01-15 DIAGNOSIS — M51.26 LUMBAR DISC HERNIATION: ICD-10-CM

## 2025-01-15 DIAGNOSIS — M48.061 SPINAL STENOSIS OF LUMBAR REGION WITHOUT NEUROGENIC CLAUDICATION: Primary | ICD-10-CM

## 2025-01-15 NOTE — TELEPHONE ENCOUNTER
Called and spoke with pt, she is having a lot of pain in her back, and now both legs. I told her that in her last notes it showed that Dr. Yee offered pain mgmt and told her I could put in a referral for it. She said she would like to proceed.     Referral pended.

## 2025-01-15 NOTE — TELEPHONE ENCOUNTER
----- Message from Med Assistant Swenson sent at 1/15/2025  1:38 PM CST -----  Patient called asking for advice from the Nurse about her pain she is experiencing. She has appointments set for the first available time.

## 2025-01-16 DIAGNOSIS — M47.816 FACET ARTHRITIS OF LUMBAR REGION: Primary | ICD-10-CM

## 2025-01-16 RX ORDER — MELOXICAM 15 MG/1
15 TABLET ORAL DAILY
Qty: 90 TABLET | Refills: 1 | Status: SHIPPED | OUTPATIENT
Start: 2025-01-16

## 2025-01-16 NOTE — TELEPHONE ENCOUNTER
----- Message from Med Assistant Gruber sent at 1/16/2025  3:43 PM CST -----  Patient is requesting refill of Mobic to be sent to pharmacy on file. If possible, patient requesting 90 supply

## 2025-02-09 ENCOUNTER — HOSPITAL ENCOUNTER (EMERGENCY)
Facility: HOSPITAL | Age: 75
Discharge: HOME | End: 2025-02-09
Attending: EMERGENCY MEDICINE
Payer: MEDICARE

## 2025-02-09 VITALS
OXYGEN SATURATION: 100 % | WEIGHT: 170 LBS | DIASTOLIC BLOOD PRESSURE: 82 MMHG | SYSTOLIC BLOOD PRESSURE: 166 MMHG | BODY MASS INDEX: 33.38 KG/M2 | HEART RATE: 80 BPM | RESPIRATION RATE: 20 BRPM | HEIGHT: 60 IN | TEMPERATURE: 98 F

## 2025-02-09 DIAGNOSIS — M54.32 SCIATICA OF LEFT SIDE: Primary | ICD-10-CM

## 2025-02-09 PROCEDURE — 99284 EMERGENCY DEPT VISIT MOD MDM: CPT | Mod: 25

## 2025-02-09 PROCEDURE — 63600175 PHARM REV CODE 636 W HCPCS: Performed by: NURSE PRACTITIONER

## 2025-02-09 PROCEDURE — 96372 THER/PROPH/DIAG INJ SC/IM: CPT | Performed by: NURSE PRACTITIONER

## 2025-02-09 RX ORDER — METHYLPREDNISOLONE 4 MG/1
TABLET ORAL
Qty: 21 EACH | Refills: 0 | Status: SHIPPED | OUTPATIENT
Start: 2025-02-09 | End: 2025-03-02

## 2025-02-09 RX ORDER — DEXAMETHASONE SODIUM PHOSPHATE 4 MG/ML
8 INJECTION, SOLUTION INTRA-ARTICULAR; INTRALESIONAL; INTRAMUSCULAR; INTRAVENOUS; SOFT TISSUE
Status: COMPLETED | OUTPATIENT
Start: 2025-02-09 | End: 2025-02-09

## 2025-02-09 RX ADMIN — DEXAMETHASONE SODIUM PHOSPHATE 8 MG: 4 INJECTION, SOLUTION INTRA-ARTICULAR; INTRALESIONAL; INTRAMUSCULAR; INTRAVENOUS; SOFT TISSUE at 10:02

## 2025-02-09 NOTE — DISCHARGE INSTRUCTIONS
There is a stretching exercises I have given you.  Do them every day.  When you on a long trip get out of the car every 3 hours and stretch.  Take your medication as prescribed.  Return to the ED for any worsening of symptoms or any other concerns.  Please see your primary care doctor to see if she will increase your Neurontin.

## 2025-02-09 NOTE — FIRST PROVIDER EVALUATION
"Medical screening examination initiated.  I have conducted a focused provider triage encounter, findings are as follows:    Brief history of present illness:  presents with pain to low back down left leg.   Dr Yee dx with herniated disc per MRI. Pt had epidural inj. 5 days ago  She is not sure of what " kind of shot I had"  Dr. Howard    There were no vitals filed for this visit.    Pertinent physical exam:  pt is ambulatory per self  Her gate is steady.      Brief workup plan:  awaiting provider      Preliminary workup initiated; this workup will be continued and followed by the physician or advanced practice provider that is assigned to the patient when roomed.  "

## 2025-02-09 NOTE — ED PROVIDER NOTES
Encounter Date: 2/9/2025       History     Chief Complaint   Patient presents with    Back Pain     Radiating down LLE     Presents with complaint of back pain radiating down her left lower extremity.  Patient reports she had an MRI that showed she had herniated disc.  She has been seen by Dr. Yee.  Her primary care doctor is treating her with Neurontin 100 mg 3 times a day.  She went to see Dr. Pearson about 3 weeks ago.  She had an epidural injection.  She reports it is not helping her pain.  I have explained to this lady that she will need to talk to Dr. Pearson about the that usually you need more than 1 of those injections.  After I told her that she said yesterday told me that.  She has not called Dr. Yee nor has she called Dr. Pearson.  She denies injury.      Review of patient's allergies indicates:  No Known Allergies  Past Medical History:   Diagnosis Date    Anxiety     Constipation     Coronary artery disease     Esophageal stricture     GERD (gastroesophageal reflux disease)     indefinite PPI    Hyperlipidemia     Hypertension     Mitral valve prolapse     ARLETH on CPAP     Osteoporosis      Past Surgical History:   Procedure Laterality Date    APPENDECTOMY      BREAST BIOPSY      BREAST CYST ASPIRATION      BREAST SURGERY  12/2010    Reduction    cardiovascular stress testing  09/19/2017    CHOLECYSTECTOMY  12/13/2017    Laparoscopic- Dr Lai    CORONARY STENT PLACEMENT  2007    echocardiography  09/19/2017    ESOPHAGOGASTRODUODENOSCOPY      INTRAMEDULLARY RODDING OF FEMUR Right 6/5/2022    Procedure: INSERTION, INTRAMEDULLARY JOHAN, FEMUR;  Surgeon: Choco Cristobal MD;  Location: [facility];  Service: Orthopedics;  Laterality: Right;    TONSILLECTOMY      TOTAL REDUCTION MAMMOPLASTY      TUBAL LIGATION       Family History   Problem Relation Name Age of Onset    Breast cancer Mother  77    Hyperlipidemia Mother      Hypertension Mother      Lung cancer Father      Hyperlipidemia Father       Diabetes Maternal Uncle      Lung cancer Paternal Uncle      Stroke Maternal Grandmother      Diabetes Brother      Hyperlipidemia Brother      Cancer Brother          lymphoma     Social History     Tobacco Use    Smoking status: Never    Smokeless tobacco: Never   Substance Use Topics    Alcohol use: Yes     Comment: occ    Drug use: No     Review of Systems   Constitutional:  Negative for fever.   Respiratory:  Negative for cough, shortness of breath and wheezing.    Cardiovascular:  Negative for chest pain, palpitations and leg swelling.   Gastrointestinal:  Negative for abdominal pain, diarrhea, nausea and vomiting.   Musculoskeletal:  Positive for back pain. Negative for gait problem, myalgias and neck pain.   Skin:  Negative for rash.   Neurological:  Negative for weakness.       Physical Exam     Initial Vitals [02/09/25 0833]   BP Pulse Resp Temp SpO2   (!) 168/92 68 16 98.4 °F (36.9 °C) 95 %      MAP       --         Physical Exam    Constitutional: She appears well-developed and well-nourished.   HENT:   Head: Normocephalic and atraumatic. Mouth/Throat: Oropharynx is clear and moist.   Eyes: Conjunctivae are normal.   Neck: Neck supple.   Normal range of motion.  Cardiovascular:  Normal rate, regular rhythm and normal heart sounds.           Pulmonary/Chest: Breath sounds normal. No respiratory distress.   Abdominal: Abdomen is soft. Bowel sounds are normal. She exhibits no distension. There is no abdominal tenderness.   Musculoskeletal:         General: Tenderness present. Normal range of motion.      Cervical back: Normal range of motion and neck supple.      Comments: Tenderness to palpation to the left buttocks.  Patient has full range of motion of the left hip.  She has full range of motion to her left knee.  Patient's gait is steady.  She does walk slower than she usually does according to herself she did drive herself here to the ED.     Neurological: She is alert and oriented to person, place, and  time. No sensory deficit. GCS score is 15. GCS eye subscore is 4. GCS verbal subscore is 5. GCS motor subscore is 6.   Skin: Skin is warm and dry. Capillary refill takes less than 2 seconds.   Psychiatric: She has a normal mood and affect. Thought content normal.         ED Course   Procedures  Labs Reviewed - No data to display       Imaging Results    None          Medications   dexAMETHasone injection 8 mg (8 mg Intramuscular Given 2/9/25 1000)     Medical Decision Making  Presents with low back pain radiating into her left buttocks and down her leg.  Patient has been seen by Dr. Yee.  He sent her to Dr. Pearson for a epidural injection as she has a herniated disc.  Patient denies bowel or bladder incontinence.  She reports the epidural did not relieve her pain but for a couple of days.    Amount and/or Complexity of Data Reviewed  Discussion of management or test interpretation with external provider(s): Patient is given Decadron 8 mg IM her sciatic nerve pain.  I have had to do a lot of education with this patient.  I have told her to follow up with her primary care doctor.  She needs to let her know that 100 mg 3 times a day is not helping her even when she has taken all 3 of my at night prior to bedtime per Dr. Pearson.  She also needs to follow up with Dr. Yee regarding the herniated disc.  She also needs to follow up Dr. Pearson regarding the fact that she may need another epidural injection for pain.  Given her a Medrol Dosepak for home use.  This patient ambulated out of here without any difficulty.  She is extremely anxious.  I had to repeat many of my instructions over and over to her due to the anxiety.  She would be asking the question when I was in the middle of explaining something else to her.  At no time did she ever appear to be in any acute distress.  I have given her return precautions.    Risk  Prescription drug management.                                      Clinical Impression:  Final  diagnoses:  [M54.32] Sciatica of left side (Primary)          ED Disposition Condition    Discharge Stable          ED Prescriptions       Medication Sig Dispense Start Date End Date Auth. Provider    methylPREDNISolone (MEDROL DOSEPACK) 4 mg tablet use as directed 21 each 2/9/2025 3/2/2025 Jenniffer Talbert NP          Follow-up Information       Follow up With Specialties Details Why Contact Info      In 3 days  Make a follow-up appointment with your primary care doctor.             Jenniffer Talbert NP  02/09/25 2109

## 2025-03-10 ENCOUNTER — OFFICE VISIT (OUTPATIENT)
Dept: ORTHOPEDICS | Facility: CLINIC | Age: 75
End: 2025-03-10
Payer: MEDICARE

## 2025-03-10 VITALS — HEIGHT: 60 IN | BODY MASS INDEX: 33.38 KG/M2 | WEIGHT: 170 LBS

## 2025-03-10 DIAGNOSIS — M51.26 LUMBAR DISC HERNIATION: ICD-10-CM

## 2025-03-10 DIAGNOSIS — M48.061 SPINAL STENOSIS OF LUMBAR REGION WITHOUT NEUROGENIC CLAUDICATION: Primary | ICD-10-CM

## 2025-03-10 DIAGNOSIS — M47.816 FACET ARTHRITIS OF LUMBAR REGION: ICD-10-CM

## 2025-03-10 DIAGNOSIS — M54.16 LUMBAR RADICULOPATHY: ICD-10-CM

## 2025-03-10 DIAGNOSIS — M51.369 DEGENERATION OF INTERVERTEBRAL DISC OF LUMBAR REGION WITHOUT DISCOGENIC BACK PAIN OR LOWER EXTREMITY PAIN: ICD-10-CM

## 2025-03-10 PROCEDURE — 99999 PR PBB SHADOW E&M-EST. PATIENT-LVL III: CPT | Mod: PBBFAC,,, | Performed by: ORTHOPAEDIC SURGERY

## 2025-03-10 PROCEDURE — 99213 OFFICE O/P EST LOW 20 MIN: CPT | Mod: S$PBB,,, | Performed by: ORTHOPAEDIC SURGERY

## 2025-03-10 PROCEDURE — 99213 OFFICE O/P EST LOW 20 MIN: CPT | Mod: PBBFAC,PN | Performed by: ORTHOPAEDIC SURGERY

## 2025-03-10 RX ORDER — TRAMADOL HYDROCHLORIDE 50 MG/1
50 TABLET ORAL EVERY 6 HOURS PRN
Qty: 28 TABLET | Refills: 0 | Status: SHIPPED | OUTPATIENT
Start: 2025-03-10 | End: 2025-03-17

## 2025-03-10 NOTE — PROGRESS NOTES
Subjective:       Patient ID: Aimee Palumbo is a 74 y.o. female.    Chief Complaint: Pain of the Lumbar Spine (Patient is here for a f/up on Lumbar pain, states her pain is the same as her last visit, pain radiates down Left leg to ankle, numbness and tingling has resolved, saw Dr. Howard in [place] for pain mgt, received injection 2/4/2025, states the injection helped but has flared back up. ER visit 2/11/25 due to pain.  Medrol dose pack and steroid injection helped but didn't last long. )      History of Present Illness    Prior to meeting with the patient I reviewed the medical chart in [facility]. This included reviewing the previous progress notes from our office, review of the patient's last appointment with their primary care provider, review of any visits to the emergency room, and review of any pain management appointments or procedures.   Patient returns for reassessment today continued back pain with radiation down left leg to her foot.  Patient had an epidural steroid injection about a month ago that only afforded her some mild relief of her symptoms.  Pains go down the left leg to the foot.    Current Medications  Current Medications[1]    Allergies  Review of patient's allergies indicates:  No Known Allergies    Past Medical History  Past Medical History:   Diagnosis Date    Anxiety     Constipation     Coronary artery disease     Esophageal stricture     GERD (gastroesophageal reflux disease)     indefinite PPI    Hyperlipidemia     Hypertension     Mitral valve prolapse     ARLETH on CPAP     Osteoporosis        Surgical History  Past Surgical History:   Procedure Laterality Date    APPENDECTOMY      BREAST BIOPSY      BREAST CYST ASPIRATION      BREAST SURGERY  12/2010    Reduction    cardiovascular stress testing  09/19/2017    CHOLECYSTECTOMY  12/13/2017    Laparoscopic- Dr Lai    CORONARY STENT PLACEMENT  2007    echocardiography  09/19/2017    ESOPHAGOGASTRODUODENOSCOPY      INTRAMEDULLARY  RODDING OF FEMUR Right 6/5/2022    Procedure: INSERTION, INTRAMEDULLARY JOHAN, FEMUR;  Surgeon: Choco Cristobal MD;  Location: [facility];  Service: Orthopedics;  Laterality: Right;    TONSILLECTOMY      TOTAL REDUCTION MAMMOPLASTY      TUBAL LIGATION         Family History:   Family History   Problem Relation Name Age of Onset    Breast cancer Mother  77    Hyperlipidemia Mother      Hypertension Mother      Lung cancer Father      Hyperlipidemia Father      Diabetes Maternal Uncle      Lung cancer Paternal Uncle      Stroke Maternal Grandmother      Diabetes Brother      Hyperlipidemia Brother      Cancer Brother          lymphoma       Social History:   Social History[2]    Hospitalization/Major Diagnostic Procedure:     Review of Systems     General/Constitutional:  Chills denies. Fatigue denies. Fever denies. Weight gain denies. Weight loss denies.    Respiratory:  Shortness of breath denies.    Cardiovascular:  Chest pain denies.    Gastrointestinal:  Constipation denies. Diarrhea denies. Nausea denies. Vomiting denies.     Hematology:  Easy bruising denies. Prolonged bleeding denies.     Genitourinary:  Frequent urination denies. Pain in lower back denies. Painful urination denies.     Musculoskeletal:  See HPI for details    Skin:  Rash denies.    Neurologic:  Dizziness denies. Gait abnormalities denies. Seizures denies. Tingling/Numbess denies.    Psychiatric:  Anxiety denies. Depressed mood denies.     Objective:   Vital Signs: There were no vitals filed for this visit.     Physical Exam      General Examination:     Constitutional: The patient is alert and oriented to lace person and time. Mood is pleasant.     Head/Face: Normal facial features normal eyebrows    Eyes: Normal extraocular motion bilaterally    Lungs: Respirations are equal and unlabored    Gait is coordinated.    Cardiovascular: There are no swelling or varicosities present.    Lymphatic: Negative for adenopathy    Skin:  Normal    Neurological: Level of consciousness normal. Oriented to place person and time and situation    Psychiatric: Oriented to time place person and situation    Straight-leg-raising positive left side positive spasm subjective numbness L5 nerve root distribution motor exam intact  XRAY Report/ Interpretation:  Prior MRI reviewed large central and left paracentral disc protrusion L4-5      Assessment:       1. Spinal stenosis of lumbar region without neurogenic claudication    2. Lumbar disc herniation    3. Lumbar radiculopathy    4. Degeneration of intervertebral disc of lumbar region without discogenic back pain or lower extremity pain    5. Facet arthritis of lumbar region        Plan:       Aimee was seen today for pain.    Diagnoses and all orders for this visit:    Spinal stenosis of lumbar region without neurogenic claudication    Lumbar disc herniation  -     traMADoL (ULTRAM) 50 mg tablet; Take 1 tablet (50 mg total) by mouth every 6 (six) hours as needed.    Lumbar radiculopathy  -     traMADoL (ULTRAM) 50 mg tablet; Take 1 tablet (50 mg total) by mouth every 6 (six) hours as needed.    Degeneration of intervertebral disc of lumbar region without discogenic back pain or lower extremity pain    Facet arthritis of lumbar region         Follow up for 2 week f/u - discuss lumbar laminectomy.    Symptoms have been present for about 4 months in his failed conservative measures thus far I advised her options are to have another epidural steroid injection under have surgery I am not very optimistic that he repeat epidural steroid injection will be successful.  I have also explained to her that after having an epidural shot she should wait several weeks or months or so after the to reduce any risk of infection.  Prescription for tramadol given today.  Return 2 weeks.      Treatment options were discussed with regards to the nature of the medical condition. Conservative pain intervention and surgical options  were discussed in detail. The probability of success of each separate treatment option was discussed. The patient expressed a clear understanding of the treatment options. With regards to surgery, the procedure risk, benefits, complications, and outcomes were discussed. No guarantees were given with regards to surgical outcome.   The risk of complications, morbidity, and mortality of patient management decisions have been made at the time of this visit. These are associated with the patient's problems, diagnostic procedures and treatment options. This includes the possible management options selected and those considered but not selected by the patient after shared medical decision making we discussed with the patient.     This note was created using Dragon voice recognition software that occasionally misinterpreted phrases or words.         [1]   Current Outpatient Medications   Medication Sig Dispense Refill    aspirin 325 MG tablet Take 1 tablet by mouth every morning.      ezetimibe (ZETIA) 10 mg tablet Take 10 mg by mouth once daily.      hydrocortisone (ANUCORT-HC) 25 mg suppository INSERT 1 RECTALLY PRN      linaCLOtide (LINZESS) 145 mcg Cap capsule Take 1 capsule by mouth once daily.      lisinopriL-hydrochlorothiazide (PRINZIDE,ZESTORETIC) 20-25 mg Tab Take 1 tablet by mouth.      meloxicam (MOBIC) 15 MG tablet Take 1 tablet (15 mg total) by mouth once daily. 90 tablet 1    metoprolol succinate (TOPROL-XL) 50 MG 24 hr tablet Take 1 tablet by mouth once daily.      multivitamin (THERAGRAN) per tablet Take 1 tablet by mouth 2 (two) times a day.       nitroGLYCERIN (NITROSTAT) 0.4 MG SL tablet Place 0.4 mg under the tongue every 5 (five) minutes as needed for Chest pain.      pantoprazole (PROTONIX) 40 MG tablet TAKE 1 TABLET BY MOUTH EVERY DAY (Patient taking differently: Take 40 mg by mouth once daily.) 30 tablet 1    rosuvastatin (CRESTOR) 40 MG Tab Take 40 mg by mouth once daily.       traMADoL (ULTRAM) 50  mg tablet Take 1 tablet (50 mg total) by mouth every 6 (six) hours as needed. 28 tablet 0     No current facility-administered medications for this visit.   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

## 2025-05-13 ENCOUNTER — HOSPITAL ENCOUNTER (OUTPATIENT)
Dept: RADIOLOGY | Facility: HOSPITAL | Age: 75
Discharge: HOME | End: 2025-05-13
Attending: PHYSICAL MEDICINE & REHABILITATION
Payer: MEDICARE

## 2025-05-13 PROCEDURE — 73600 X-RAY EXAM OF ANKLE: CPT | Mod: 26,LT,, | Performed by: RADIOLOGY

## 2025-06-10 ENCOUNTER — HOSPITAL ENCOUNTER (OUTPATIENT)
Dept: RADIOLOGY | Facility: HOSPITAL | Age: 75
Discharge: HOME | End: 2025-06-10
Attending: ORTHOPAEDIC SURGERY
Payer: MEDICARE

## 2025-06-10 DIAGNOSIS — M54.16 LUMBAR RADICULOPATHY: Primary | ICD-10-CM

## 2025-06-10 DIAGNOSIS — M54.16 LUMBAR RADICULOPATHY: ICD-10-CM

## 2025-06-10 PROCEDURE — 72100 X-RAY EXAM L-S SPINE 2/3 VWS: CPT | Mod: TC,PO

## 2025-06-10 PROCEDURE — 72100 X-RAY EXAM L-S SPINE 2/3 VWS: CPT | Mod: 26,,, | Performed by: RADIOLOGY

## 2025-07-30 ENCOUNTER — HOSPITAL ENCOUNTER (OUTPATIENT)
Dept: RADIOLOGY | Facility: HOSPITAL | Age: 75
Discharge: HOME | End: 2025-07-30
Attending: ORTHOPAEDIC SURGERY
Payer: MEDICARE

## 2025-07-30 DIAGNOSIS — M54.16 LUMBAR RADICULOPATHY: ICD-10-CM

## 2025-07-30 DIAGNOSIS — M54.16 LUMBAR RADICULOPATHY: Primary | ICD-10-CM

## 2025-07-30 PROCEDURE — 72100 X-RAY EXAM L-S SPINE 2/3 VWS: CPT | Mod: 26,,, | Performed by: RADIOLOGY

## 2025-07-30 PROCEDURE — 72100 X-RAY EXAM L-S SPINE 2/3 VWS: CPT | Mod: TC,PO

## 2025-09-05 ENCOUNTER — TELEPHONE (OUTPATIENT)
Dept: PULMONOLOGY | Facility: CLINIC | Age: 75
End: 2025-09-05
Payer: MEDICARE

## (undated) DEVICE — SEE MEDLINE ITEM 157131

## (undated) DEVICE — UNDERGLOVE BIOGEL PI SZ 6.5 LF

## (undated) DEVICE — DRAPE C ARM 42 X 120 10/BX

## (undated) DEVICE — STRAP OR TABLE 5IN X 72IN

## (undated) DEVICE — DRESSING AQUACEL FOAM 4X4IN

## (undated) DEVICE — SPONGE BULKEE II ABSRB 6X6.75

## (undated) DEVICE — SEE MEDLINE ITEM 157116

## (undated) DEVICE — ELECTRODE REM PLYHSV RETURN 9

## (undated) DEVICE — SLEEVE SCD EXPRESS KNEE MEDIUM

## (undated) DEVICE — DRESSING TRANS 4X4 TEGADERM

## (undated) DEVICE — BANDAGE MATRIX HK LOOP 6IN 5YD

## (undated) DEVICE — BIT DRILL CANN TAPERED 10MM

## (undated) DEVICE — BNDG COFLEX FOAM LF2 ST 6X5YD

## (undated) DEVICE — SOL 9P NACL IRR PIC IL

## (undated) DEVICE — SEE MEDLINE ITEM 157117

## (undated) DEVICE — TOWEL OR DISP STRL BLUE 4/PK

## (undated) DEVICE — GLOVE SURG ULTRA TOUCH 8

## (undated) DEVICE — DRAPE IOBAN 2 STERI

## (undated) DEVICE — APPLICATOR CHLORAPREP ORN 26ML

## (undated) DEVICE — DRAPE C-ARMOR EQUIPMENT COVER

## (undated) DEVICE — IMPLANTABLE DEVICE
Type: IMPLANTABLE DEVICE | Site: FEMUR | Status: NON-FUNCTIONAL
Removed: 2022-06-05

## (undated) DEVICE — STAPLER SKIN ROTATING HEAD

## (undated) DEVICE — SEE MEDLINE ITEM 146292

## (undated) DEVICE — LINER SUCTION 3000CC

## (undated) DEVICE — SUT 2-0 VICRYL / CT-1

## (undated) DEVICE — PADDING WYTEX UNDRCST 6INX4YD

## (undated) DEVICE — WIRE GUIDE 3.2MM 400MM
Type: IMPLANTABLE DEVICE | Site: FEMUR | Status: NON-FUNCTIONAL
Removed: 2022-06-05

## (undated) DEVICE — PACK CUSTOM UNIV BASIN SLI

## (undated) DEVICE — BIT DRILL 4.2MM 3 FLUTD 145MM

## (undated) DEVICE — GLOVE SURGEONS ULTRA TOUCH 6.5

## (undated) DEVICE — GOWN B1 X-LG X-LONG

## (undated) DEVICE — 16MM FLEXIBLE DRILL BIT

## (undated) DEVICE — SCREW STRDRV REC T25 5X46 TTNM
Type: IMPLANTABLE DEVICE | Site: FEMUR | Status: NON-FUNCTIONAL
Removed: 2022-06-05

## (undated) DEVICE — PAD PERI POST REPLACEMNT

## (undated) DEVICE — UNDERGLOVES BIOGEL PI SIZE 7.5